# Patient Record
Sex: FEMALE | Race: WHITE | Employment: OTHER | URBAN - METROPOLITAN AREA
[De-identification: names, ages, dates, MRNs, and addresses within clinical notes are randomized per-mention and may not be internally consistent; named-entity substitution may affect disease eponyms.]

---

## 2018-01-12 PROBLEM — R07.9 CHEST PAIN: Status: ACTIVE | Noted: 2018-01-12

## 2018-01-12 PROBLEM — F17.210 CIGARETTE NICOTINE DEPENDENCE WITHOUT COMPLICATION: Chronic | Status: ACTIVE | Noted: 2018-01-12

## 2018-03-21 ENCOUNTER — PREP FOR PROCEDURE (OUTPATIENT)
Dept: OTOLARYNGOLOGY | Age: 68
End: 2018-03-21

## 2018-10-31 ENCOUNTER — APPOINTMENT (OUTPATIENT)
Dept: GENERAL RADIOLOGY | Age: 68
DRG: 153 | End: 2018-10-31
Payer: COMMERCIAL

## 2018-10-31 ENCOUNTER — HOSPITAL ENCOUNTER (INPATIENT)
Age: 68
LOS: 3 days | Discharge: HOME | DRG: 153 | End: 2018-11-03
Attending: EMERGENCY MEDICINE | Admitting: INTERNAL MEDICINE
Payer: COMMERCIAL

## 2018-10-31 DIAGNOSIS — E66.2 OBESITY HYPOVENTILATION SYNDROME (HCC): Primary | ICD-10-CM

## 2018-10-31 DIAGNOSIS — R09.89 ABNORMALLY LOW PEAK EXPIRATORY FLOW RATE: ICD-10-CM

## 2018-10-31 DIAGNOSIS — J06.9 UPPER RESPIRATORY TRACT INFECTION, UNSPECIFIED TYPE: ICD-10-CM

## 2018-10-31 DIAGNOSIS — R06.02 SHORTNESS OF BREATH: ICD-10-CM

## 2018-10-31 PROBLEM — E78.5 HYPERLIPIDEMIA LDL GOAL <100: Status: ACTIVE | Noted: 2018-10-31

## 2018-10-31 PROBLEM — E66.01 MORBID OBESITY WITH BMI OF 45.0-49.9, ADULT (HCC): Chronic | Status: ACTIVE | Noted: 2018-10-31

## 2018-10-31 PROBLEM — R07.9 CHEST PAIN: Status: RESOLVED | Noted: 2018-01-12 | Resolved: 2018-10-31

## 2018-10-31 PROBLEM — E03.9 ACQUIRED HYPOTHYROIDISM: Chronic | Status: ACTIVE | Noted: 2018-10-31

## 2018-10-31 PROBLEM — R06.00 DYSPNEA: Status: ACTIVE | Noted: 2018-10-31

## 2018-10-31 PROBLEM — F17.210 CIGARETTE NICOTINE DEPENDENCE WITHOUT COMPLICATION: Chronic | Status: RESOLVED | Noted: 2018-01-12 | Resolved: 2018-10-31

## 2018-10-31 LAB
ANION GAP SERPL CALCULATED.3IONS-SCNC: 9 MMOL/L (ref 7–16)
BACTERIA: ABNORMAL /HPF
BASOPHILS ABSOLUTE: 0.05 E9/L (ref 0–0.2)
BASOPHILS RELATIVE PERCENT: 0.5 % (ref 0–2)
BILIRUBIN URINE: NEGATIVE
BLOOD, URINE: NEGATIVE
BUN BLDV-MCNC: 13 MG/DL (ref 8–23)
CALCIUM SERPL-MCNC: 9.5 MG/DL (ref 8.6–10.2)
CHLORIDE BLD-SCNC: 99 MMOL/L (ref 98–107)
CLARITY: ABNORMAL
CO2: 32 MMOL/L (ref 22–29)
COLOR: YELLOW
CREAT SERPL-MCNC: 1.1 MG/DL (ref 0.5–1)
EOSINOPHILS ABSOLUTE: 0.62 E9/L (ref 0.05–0.5)
EOSINOPHILS RELATIVE PERCENT: 6.2 % (ref 0–6)
EPITHELIAL CELLS, UA: ABNORMAL /HPF
GFR AFRICAN AMERICAN: 60
GFR NON-AFRICAN AMERICAN: 49 ML/MIN/1.73
GLUCOSE BLD-MCNC: 137 MG/DL (ref 74–109)
GLUCOSE URINE: NEGATIVE MG/DL
HCT VFR BLD CALC: 48.2 % (ref 34–48)
HEMOGLOBIN: 15 G/DL (ref 11.5–15.5)
IMMATURE GRANULOCYTES #: 0.07 E9/L
IMMATURE GRANULOCYTES %: 0.7 % (ref 0–5)
INFLUENZA A BY PCR: NOT DETECTED
INFLUENZA B BY PCR: NOT DETECTED
KETONES, URINE: NEGATIVE MG/DL
LEUKOCYTE ESTERASE, URINE: ABNORMAL
LYMPHOCYTES ABSOLUTE: 1.97 E9/L (ref 1.5–4)
LYMPHOCYTES RELATIVE PERCENT: 19.6 % (ref 20–42)
MCH RBC QN AUTO: 27.8 PG (ref 26–35)
MCHC RBC AUTO-ENTMCNC: 31.1 % (ref 32–34.5)
MCV RBC AUTO: 89.3 FL (ref 80–99.9)
MONOCYTES ABSOLUTE: 0.75 E9/L (ref 0.1–0.95)
MONOCYTES RELATIVE PERCENT: 7.4 % (ref 2–12)
NEUTROPHILS ABSOLUTE: 6.61 E9/L (ref 1.8–7.3)
NEUTROPHILS RELATIVE PERCENT: 65.6 % (ref 43–80)
NITRITE, URINE: POSITIVE
PDW BLD-RTO: 14.4 FL (ref 11.5–15)
PH UA: 7.5 (ref 5–9)
PLATELET # BLD: 258 E9/L (ref 130–450)
PMV BLD AUTO: 9.6 FL (ref 7–12)
POTASSIUM REFLEX MAGNESIUM: 4 MMOL/L (ref 3.5–5)
PRO-BNP: 67 PG/ML (ref 0–125)
PROTEIN UA: ABNORMAL MG/DL
RBC # BLD: 5.4 E12/L (ref 3.5–5.5)
RBC UA: ABNORMAL /HPF (ref 0–2)
RSV BY PCR: NEGATIVE
SODIUM BLD-SCNC: 140 MMOL/L (ref 132–146)
SPECIFIC GRAVITY UA: 1.01 (ref 1–1.03)
TROPONIN: <0.01 NG/ML (ref 0–0.03)
UROBILINOGEN, URINE: 0.2 E.U./DL
WBC # BLD: 10.1 E9/L (ref 4.5–11.5)
WBC UA: ABNORMAL /HPF (ref 0–5)

## 2018-10-31 PROCEDURE — 99285 EMERGENCY DEPT VISIT HI MDM: CPT

## 2018-10-31 PROCEDURE — 84484 ASSAY OF TROPONIN QUANT: CPT

## 2018-10-31 PROCEDURE — 80048 BASIC METABOLIC PNL TOTAL CA: CPT

## 2018-10-31 PROCEDURE — 71045 X-RAY EXAM CHEST 1 VIEW: CPT

## 2018-10-31 PROCEDURE — 87502 INFLUENZA DNA AMP PROBE: CPT

## 2018-10-31 PROCEDURE — 83880 ASSAY OF NATRIURETIC PEPTIDE: CPT

## 2018-10-31 PROCEDURE — 1200000000 HC SEMI PRIVATE

## 2018-10-31 PROCEDURE — 6370000000 HC RX 637 (ALT 250 FOR IP): Performed by: EMERGENCY MEDICINE

## 2018-10-31 PROCEDURE — 81001 URINALYSIS AUTO W/SCOPE: CPT

## 2018-10-31 PROCEDURE — 36415 COLL VENOUS BLD VENIPUNCTURE: CPT

## 2018-10-31 PROCEDURE — 85025 COMPLETE CBC W/AUTO DIFF WBC: CPT

## 2018-10-31 PROCEDURE — 87807 RSV ASSAY W/OPTIC: CPT

## 2018-10-31 PROCEDURE — 94664 DEMO&/EVAL PT USE INHALER: CPT

## 2018-10-31 PROCEDURE — 94640 AIRWAY INHALATION TREATMENT: CPT

## 2018-10-31 RX ORDER — PREDNISONE 20 MG/1
60 TABLET ORAL ONCE
Status: COMPLETED | OUTPATIENT
Start: 2018-10-31 | End: 2018-10-31

## 2018-10-31 RX ORDER — ONDANSETRON 2 MG/ML
4 INJECTION INTRAMUSCULAR; INTRAVENOUS EVERY 6 HOURS PRN
Status: DISCONTINUED | OUTPATIENT
Start: 2018-10-31 | End: 2018-11-03 | Stop reason: HOSPADM

## 2018-10-31 RX ORDER — CYCLOBENZAPRINE HCL 10 MG
5 TABLET ORAL 2 TIMES DAILY
Status: DISCONTINUED | OUTPATIENT
Start: 2018-10-31 | End: 2018-11-03 | Stop reason: HOSPADM

## 2018-10-31 RX ORDER — AMLODIPINE BESYLATE 5 MG/1
5 TABLET ORAL 2 TIMES DAILY
Status: DISCONTINUED | OUTPATIENT
Start: 2018-10-31 | End: 2018-11-03 | Stop reason: HOSPADM

## 2018-10-31 RX ORDER — SIMVASTATIN 40 MG
40 TABLET ORAL NIGHTLY
Status: DISCONTINUED | OUTPATIENT
Start: 2018-10-31 | End: 2018-10-31 | Stop reason: CLARIF

## 2018-10-31 RX ORDER — ASPIRIN 81 MG/1
81 TABLET, CHEWABLE ORAL DAILY
Status: DISCONTINUED | OUTPATIENT
Start: 2018-11-01 | End: 2018-11-03 | Stop reason: HOSPADM

## 2018-10-31 RX ORDER — DOCUSATE SODIUM 100 MG/1
100 CAPSULE, LIQUID FILLED ORAL 2 TIMES DAILY
Status: DISCONTINUED | OUTPATIENT
Start: 2018-10-31 | End: 2018-11-03 | Stop reason: HOSPADM

## 2018-10-31 RX ORDER — DEXTROSE MONOHYDRATE 25 G/50ML
12.5 INJECTION, SOLUTION INTRAVENOUS PRN
Status: DISCONTINUED | OUTPATIENT
Start: 2018-10-31 | End: 2018-11-01 | Stop reason: SDUPTHER

## 2018-10-31 RX ORDER — DEXTROSE MONOHYDRATE 50 MG/ML
100 INJECTION, SOLUTION INTRAVENOUS PRN
Status: DISCONTINUED | OUTPATIENT
Start: 2018-10-31 | End: 2018-11-01 | Stop reason: SDUPTHER

## 2018-10-31 RX ORDER — LEVOTHYROXINE SODIUM 0.1 MG/1
200 TABLET ORAL DAILY
Status: DISCONTINUED | OUTPATIENT
Start: 2018-11-01 | End: 2018-11-03 | Stop reason: HOSPADM

## 2018-10-31 RX ORDER — NICOTINE POLACRILEX 4 MG
15 LOZENGE BUCCAL PRN
Status: DISCONTINUED | OUTPATIENT
Start: 2018-10-31 | End: 2018-11-01 | Stop reason: SDUPTHER

## 2018-10-31 RX ORDER — ATORVASTATIN CALCIUM 40 MG/1
40 TABLET, FILM COATED ORAL DAILY
COMMUNITY

## 2018-10-31 RX ORDER — CETIRIZINE HYDROCHLORIDE 10 MG/1
5 TABLET ORAL DAILY
Status: DISCONTINUED | OUTPATIENT
Start: 2018-11-01 | End: 2018-11-03 | Stop reason: HOSPADM

## 2018-10-31 RX ORDER — OMEPRAZOLE 20 MG/1
20 CAPSULE, DELAYED RELEASE ORAL DAILY
COMMUNITY
End: 2020-02-18 | Stop reason: ALTCHOICE

## 2018-10-31 RX ORDER — SODIUM CHLORIDE 0.9 % (FLUSH) 0.9 %
10 SYRINGE (ML) INJECTION EVERY 12 HOURS SCHEDULED
Status: DISCONTINUED | OUTPATIENT
Start: 2018-10-31 | End: 2018-11-03 | Stop reason: HOSPADM

## 2018-10-31 RX ORDER — IPRATROPIUM BROMIDE AND ALBUTEROL SULFATE 2.5; .5 MG/3ML; MG/3ML
1 SOLUTION RESPIRATORY (INHALATION)
Status: COMPLETED | OUTPATIENT
Start: 2018-10-31 | End: 2018-10-31

## 2018-10-31 RX ORDER — HYDROXYZINE HYDROCHLORIDE 10 MG/1
25 TABLET, FILM COATED ORAL 2 TIMES DAILY
Status: DISCONTINUED | OUTPATIENT
Start: 2018-10-31 | End: 2018-11-01 | Stop reason: CLARIF

## 2018-10-31 RX ORDER — ATORVASTATIN CALCIUM 40 MG/1
40 TABLET, FILM COATED ORAL DAILY
Status: DISCONTINUED | OUTPATIENT
Start: 2018-11-01 | End: 2018-11-03 | Stop reason: HOSPADM

## 2018-10-31 RX ORDER — PREGABALIN 75 MG/1
75 CAPSULE ORAL 2 TIMES DAILY
Status: DISCONTINUED | OUTPATIENT
Start: 2018-10-31 | End: 2018-11-03 | Stop reason: HOSPADM

## 2018-10-31 RX ORDER — HYDROCODONE BITARTRATE AND ACETAMINOPHEN 10; 325 MG/1; MG/1
2 TABLET ORAL EVERY 8 HOURS PRN
Status: DISCONTINUED | OUTPATIENT
Start: 2018-10-31 | End: 2018-11-03 | Stop reason: HOSPADM

## 2018-10-31 RX ORDER — PANTOPRAZOLE SODIUM 40 MG/1
40 TABLET, DELAYED RELEASE ORAL
Status: DISCONTINUED | OUTPATIENT
Start: 2018-11-01 | End: 2018-11-03 | Stop reason: HOSPADM

## 2018-10-31 RX ORDER — SODIUM CHLORIDE 0.9 % (FLUSH) 0.9 %
10 SYRINGE (ML) INJECTION PRN
Status: DISCONTINUED | OUTPATIENT
Start: 2018-10-31 | End: 2018-11-03 | Stop reason: HOSPADM

## 2018-10-31 RX ORDER — HYDROCHLOROTHIAZIDE 12.5 MG/1
12.5 TABLET ORAL EVERY MORNING
Status: DISCONTINUED | OUTPATIENT
Start: 2018-11-01 | End: 2018-11-03 | Stop reason: HOSPADM

## 2018-10-31 RX ORDER — ALBUTEROL SULFATE 90 UG/1
2 AEROSOL, METERED RESPIRATORY (INHALATION) EVERY 6 HOURS PRN
COMMUNITY
End: 2020-02-18 | Stop reason: ALTCHOICE

## 2018-10-31 RX ORDER — TRAMADOL HYDROCHLORIDE 50 MG/1
50 TABLET ORAL EVERY 6 HOURS PRN
Status: DISCONTINUED | OUTPATIENT
Start: 2018-10-31 | End: 2018-11-03 | Stop reason: HOSPADM

## 2018-10-31 RX ADMIN — IPRATROPIUM BROMIDE AND ALBUTEROL SULFATE 1 AMPULE: .5; 3 SOLUTION RESPIRATORY (INHALATION) at 18:23

## 2018-10-31 RX ADMIN — IPRATROPIUM BROMIDE AND ALBUTEROL SULFATE 1 AMPULE: .5; 3 SOLUTION RESPIRATORY (INHALATION) at 18:26

## 2018-10-31 RX ADMIN — IPRATROPIUM BROMIDE AND ALBUTEROL SULFATE 1 AMPULE: .5; 3 SOLUTION RESPIRATORY (INHALATION) at 18:24

## 2018-10-31 RX ADMIN — PREDNISONE 60 MG: 20 TABLET ORAL at 19:34

## 2018-10-31 ASSESSMENT — ENCOUNTER SYMPTOMS
SWOLLEN GLANDS: 0
WHEEZING: 0
COUGH: 1
SORE THROAT: 0
VOMITING: 0
SPUTUM PRODUCTION: 1
SHORTNESS OF BREATH: 1
ABDOMINAL PAIN: 0
HEMOPTYSIS: 0

## 2018-10-31 ASSESSMENT — PAIN SCALES - GENERAL
PAINLEVEL_OUTOF10: 7
PAINLEVEL_OUTOF10: 0

## 2018-10-31 ASSESSMENT — PULMONARY FUNCTION TESTS: PEFR_L/MIN: 230

## 2018-10-31 ASSESSMENT — PAIN DESCRIPTION - LOCATION: LOCATION: RIB CAGE

## 2018-10-31 NOTE — ED NOTES
Pt to ED for flulike symptoms beginning a week ago. Pt had cough and c/o bilateral rib pain. Pt stated that she has a small amount of yellow sputum, denies any blood.        Juan Alberto Mata RN  10/31/18 [id number]

## 2018-10-31 NOTE — ED PROVIDER NOTES
Patient is a 79-year-old female presents from home for treatment of shortness of breath. She states that she has been progressively getting more and more short of breath last week, however her lift home is broken so she is unable to get her doctor. She states that she called her primary care physician and he recommended that she call an ambulance to come to the ED for evaluation. Patient states that this happens to her every year approximately this time of year, she gets short of breath. She also states that she has been coughing up yellow mucus. She denies any abdominal pain, diarrhea, constipation, chest pain, headache, nausea, or vomiting. She states that she thinks she has the flu. The history is provided by the patient. Shortness of Breath   Severity:  Moderate  Onset quality:  Gradual  Duration:  1 week  Timing:  Constant  Progression:  Worsening  Chronicity:  New  Context: activity    Relieved by:  None tried  Worsened by: Activity, exertion and movement  Ineffective treatments:  None tried  Associated symptoms: cough and sputum production    Associated symptoms: no abdominal pain, no chest pain, no claudication, no diaphoresis, no ear pain, no fever, no headaches, no hemoptysis, no neck pain, no PND, no rash, no sore throat, no syncope, no swollen glands, no vomiting and no wheezing    Risk factors: obesity        Review of Systems   Constitutional: Negative for diaphoresis and fever. HENT: Negative for ear pain and sore throat. Respiratory: Positive for cough, sputum production and shortness of breath. Negative for hemoptysis and wheezing. Cardiovascular: Negative for chest pain, claudication, syncope and PND. Gastrointestinal: Negative for abdominal pain and vomiting. Musculoskeletal: Negative for neck pain. Skin: Negative for rash. Neurological: Negative for headaches. Physical Exam   Constitutional: She is oriented to person, place, and time.  Vital signs are normal. She 146 mmol/L    Potassium reflex Magnesium 4.0 3.5 - 5.0 mmol/L    Chloride 99 98 - 107 mmol/L    CO2 32 (H) 22 - 29 mmol/L    Anion Gap 9 7 - 16 mmol/L    Glucose 137 (H) 74 - 109 mg/dL    BUN 13 8 - 23 mg/dL    CREATININE 1.1 (H) 0.5 - 1.0 mg/dL    GFR Non-African American 49 >=60 mL/min/1.73    GFR African American 60     Calcium 9.5 8.6 - 10.2 mg/dL   Brain Natriuretic Peptide   Result Value Ref Range    Pro-BNP 67 0 - 125 pg/mL   Troponin   Result Value Ref Range    Troponin <0.01 0.00 - 0.03 ng/mL       RADIOLOGY:  XR CHEST PORTABLE   Final Result   1. Stable, enlarged cardiac mediastinal silhouette with underlying   central pulmonary vascular congestion. 2. Indeterminate opacification of the lung bases, findings could be   seen in atelectasis versus pneumonia, clinical correlation   recommended. ------------------------- NURSING NOTES AND VITALS REVIEWED ---------------------------  Date / Time Roomed:  10/31/2018  4:05 PM  ED Bed Assignment:  07/07    The nursing notes within the ED encounter and vital signs as below have been reviewed. Patient Vitals for the past 24 hrs:   BP Temp Temp src Pulse Resp SpO2 Height Weight   10/31/18 1936 (!) 132/94 - - 104 18 97 % - -   10/31/18 1826 - - - - - 100 % - -   10/31/18 1614 114/67 98.5 °F (36.9 °C) Oral 94 18 96 % 5' 5\" (1.651 m) (!) 489 lb (221.8 kg)       Oxygen Saturation Interpretation: Normal    ------------------------------------------ PROGRESS NOTES ------------------------------------------  Re-evaluation(s):  Time: 2000  Patients symptoms are improving  Repeat physical examination is improved    Counseling:  I have spoken with the patient and discussed todays results, in addition to providing specific details for the plan of care and counseling regarding the diagnosis and prognosis.   Their questions are answered at this time and they are agreeable with the plan of admission.    --------------------------------- ADDITIONAL PROVIDER NOTES ---------------------------------  Consultations:  Time: 1955. Spoke with Dr. Edvin Oliver. Discussed case. They will admit the patient. This patient's ED course included: a personal history and physicial examination    This patient has remained hemodynamically stable during their ED course. Diagnosis:  1. Obesity hypoventilation syndrome ([address]    2. Shortness of breath    3. Upper respiratory tract infection, unspecified type    4. Abnormally low peak expiratory flow rate        Disposition:  Patient's disposition: Admit to Med/Surg  Patient's condition is stable.            Daniela Us DO  Resident  10/31/18 2009

## 2018-11-01 PROBLEM — E11.9 DM (DIABETES MELLITUS), TYPE 2 (HCC): Status: ACTIVE | Noted: 2018-11-01

## 2018-11-01 LAB
ALBUMIN SERPL-MCNC: 3.4 G/DL (ref 3.5–5.2)
ALP BLD-CCNC: 108 U/L (ref 35–104)
ALT SERPL-CCNC: 10 U/L (ref 0–32)
ANION GAP SERPL CALCULATED.3IONS-SCNC: 14 MMOL/L (ref 7–16)
AST SERPL-CCNC: 19 U/L (ref 0–31)
BASOPHILS ABSOLUTE: 0.02 E9/L (ref 0–0.2)
BASOPHILS RELATIVE PERCENT: 0.2 % (ref 0–2)
BILIRUB SERPL-MCNC: 0.2 MG/DL (ref 0–1.2)
BUN BLDV-MCNC: 16 MG/DL (ref 8–23)
CALCIUM SERPL-MCNC: 9.3 MG/DL (ref 8.6–10.2)
CHLORIDE BLD-SCNC: 94 MMOL/L (ref 98–107)
CO2: 25 MMOL/L (ref 22–29)
CREAT SERPL-MCNC: 1 MG/DL (ref 0.5–1)
EOSINOPHILS ABSOLUTE: 0.01 E9/L (ref 0.05–0.5)
EOSINOPHILS RELATIVE PERCENT: 0.1 % (ref 0–6)
GFR AFRICAN AMERICAN: >60
GFR NON-AFRICAN AMERICAN: 55 ML/MIN/1.73
GLUCOSE BLD-MCNC: 280 MG/DL (ref 74–109)
HBA1C MFR BLD: 7 % (ref 4–5.6)
HCT VFR BLD CALC: 41.4 % (ref 34–48)
HEMOGLOBIN: 13 G/DL (ref 11.5–15.5)
IMMATURE GRANULOCYTES #: 0.1 E9/L
IMMATURE GRANULOCYTES %: 0.8 % (ref 0–5)
LYMPHOCYTES ABSOLUTE: 1.23 E9/L (ref 1.5–4)
LYMPHOCYTES RELATIVE PERCENT: 10.1 % (ref 20–42)
MAGNESIUM: 2 MG/DL (ref 1.6–2.6)
MCH RBC QN AUTO: 27.4 PG (ref 26–35)
MCHC RBC AUTO-ENTMCNC: 31.4 % (ref 32–34.5)
MCV RBC AUTO: 87.3 FL (ref 80–99.9)
METER GLUCOSE: 141 MG/DL (ref 70–110)
METER GLUCOSE: 152 MG/DL (ref 70–110)
METER GLUCOSE: 154 MG/DL (ref 70–110)
METER GLUCOSE: 196 MG/DL (ref 70–110)
METER GLUCOSE: 233 MG/DL (ref 70–110)
MONOCYTES ABSOLUTE: 0.28 E9/L (ref 0.1–0.95)
MONOCYTES RELATIVE PERCENT: 2.3 % (ref 2–12)
NEUTROPHILS ABSOLUTE: 10.51 E9/L (ref 1.8–7.3)
NEUTROPHILS RELATIVE PERCENT: 86.5 % (ref 43–80)
PDW BLD-RTO: 14.3 FL (ref 11.5–15)
PLATELET # BLD: 293 E9/L (ref 130–450)
PMV BLD AUTO: 10.3 FL (ref 7–12)
POTASSIUM SERPL-SCNC: 5.2 MMOL/L (ref 3.5–5)
RBC # BLD: 4.74 E12/L (ref 3.5–5.5)
SODIUM BLD-SCNC: 133 MMOL/L (ref 132–146)
TOTAL PROTEIN: 7.3 G/DL (ref 6.4–8.3)
WBC # BLD: 12.2 E9/L (ref 4.5–11.5)

## 2018-11-01 PROCEDURE — 94660 CPAP INITIATION&MGMT: CPT

## 2018-11-01 PROCEDURE — 6360000002 HC RX W HCPCS: Performed by: INTERNAL MEDICINE

## 2018-11-01 PROCEDURE — G8987 SELF CARE CURRENT STATUS: HCPCS

## 2018-11-01 PROCEDURE — 80053 COMPREHEN METABOLIC PANEL: CPT

## 2018-11-01 PROCEDURE — 87581 M.PNEUMON DNA AMP PROBE: CPT

## 2018-11-01 PROCEDURE — G8988 SELF CARE GOAL STATUS: HCPCS

## 2018-11-01 PROCEDURE — 83036 HEMOGLOBIN GLYCOSYLATED A1C: CPT

## 2018-11-01 PROCEDURE — 6370000000 HC RX 637 (ALT 250 FOR IP): Performed by: INTERNAL MEDICINE

## 2018-11-01 PROCEDURE — 83735 ASSAY OF MAGNESIUM: CPT

## 2018-11-01 PROCEDURE — 85025 COMPLETE CBC W/AUTO DIFF WBC: CPT

## 2018-11-01 PROCEDURE — 97165 OT EVAL LOW COMPLEX 30 MIN: CPT

## 2018-11-01 PROCEDURE — 36415 COLL VENOUS BLD VENIPUNCTURE: CPT

## 2018-11-01 PROCEDURE — 87633 RESP VIRUS 12-25 TARGETS: CPT

## 2018-11-01 PROCEDURE — 87486 CHLMYD PNEUM DNA AMP PROBE: CPT

## 2018-11-01 PROCEDURE — 94640 AIRWAY INHALATION TREATMENT: CPT

## 2018-11-01 PROCEDURE — 2580000003 HC RX 258: Performed by: INTERNAL MEDICINE

## 2018-11-01 PROCEDURE — 2500000003 HC RX 250 WO HCPCS: Performed by: INTERNAL MEDICINE

## 2018-11-01 PROCEDURE — 87798 DETECT AGENT NOS DNA AMP: CPT

## 2018-11-01 PROCEDURE — 97161 PT EVAL LOW COMPLEX 20 MIN: CPT

## 2018-11-01 PROCEDURE — 82962 GLUCOSE BLOOD TEST: CPT

## 2018-11-01 PROCEDURE — 1200000000 HC SEMI PRIVATE

## 2018-11-01 RX ORDER — PREDNISONE 20 MG/1
20 TABLET ORAL 2 TIMES DAILY
Status: DISCONTINUED | OUTPATIENT
Start: 2018-11-01 | End: 2018-11-03 | Stop reason: HOSPADM

## 2018-11-01 RX ORDER — HYDROXYZINE PAMOATE 25 MG/1
25 CAPSULE ORAL 2 TIMES DAILY
Status: DISCONTINUED | OUTPATIENT
Start: 2018-11-01 | End: 2018-11-03 | Stop reason: HOSPADM

## 2018-11-01 RX ORDER — IPRATROPIUM BROMIDE AND ALBUTEROL SULFATE 2.5; .5 MG/3ML; MG/3ML
1 SOLUTION RESPIRATORY (INHALATION)
Status: DISCONTINUED | OUTPATIENT
Start: 2018-11-01 | End: 2018-11-03 | Stop reason: HOSPADM

## 2018-11-01 RX ORDER — DEXTROSE MONOHYDRATE 25 G/50ML
12.5 INJECTION, SOLUTION INTRAVENOUS PRN
Status: DISCONTINUED | OUTPATIENT
Start: 2018-11-01 | End: 2018-11-03 | Stop reason: HOSPADM

## 2018-11-01 RX ORDER — NICOTINE POLACRILEX 4 MG
15 LOZENGE BUCCAL PRN
Status: DISCONTINUED | OUTPATIENT
Start: 2018-11-01 | End: 2018-11-03 | Stop reason: HOSPADM

## 2018-11-01 RX ORDER — DEXTROSE MONOHYDRATE 50 MG/ML
100 INJECTION, SOLUTION INTRAVENOUS PRN
Status: DISCONTINUED | OUTPATIENT
Start: 2018-11-01 | End: 2018-11-03 | Stop reason: HOSPADM

## 2018-11-01 RX ADMIN — DOCUSATE SODIUM 100 MG: 100 CAPSULE, LIQUID FILLED ORAL at 20:26

## 2018-11-01 RX ADMIN — HYDROXYZINE PAMOATE 25 MG: 25 CAPSULE ORAL at 20:26

## 2018-11-01 RX ADMIN — Medication 10 ML: at 08:47

## 2018-11-01 RX ADMIN — CYCLOBENZAPRINE HYDROCHLORIDE 5 MG: 10 TABLET, FILM COATED ORAL at 00:19

## 2018-11-01 RX ADMIN — METOPROLOL TARTRATE 25 MG: 25 TABLET ORAL at 08:42

## 2018-11-01 RX ADMIN — INSULIN LISPRO 1 UNITS: 100 INJECTION, SOLUTION INTRAVENOUS; SUBCUTANEOUS at 00:22

## 2018-11-01 RX ADMIN — ATORVASTATIN CALCIUM 40 MG: 40 TABLET, FILM COATED ORAL at 08:42

## 2018-11-01 RX ADMIN — PREDNISONE 20 MG: 20 TABLET ORAL at 20:26

## 2018-11-01 RX ADMIN — AMLODIPINE BESYLATE 5 MG: 5 TABLET ORAL at 20:26

## 2018-11-01 RX ADMIN — CYCLOBENZAPRINE HYDROCHLORIDE 5 MG: 10 TABLET, FILM COATED ORAL at 20:26

## 2018-11-01 RX ADMIN — HYDROCODONE BITARTRATE AND ACETAMINOPHEN 2 TABLET: 10; 325 TABLET ORAL at 00:27

## 2018-11-01 RX ADMIN — IPRATROPIUM BROMIDE AND ALBUTEROL SULFATE 1 AMPULE: .5; 3 SOLUTION RESPIRATORY (INHALATION) at 19:29

## 2018-11-01 RX ADMIN — HYDROXYZINE HYDROCHLORIDE 25 MG: 10 TABLET ORAL at 08:43

## 2018-11-01 RX ADMIN — HYDROCODONE BITARTRATE AND ACETAMINOPHEN 2 TABLET: 10; 325 TABLET ORAL at 08:52

## 2018-11-01 RX ADMIN — AZITHROMYCIN MONOHYDRATE 500 MG: 500 INJECTION, POWDER, LYOPHILIZED, FOR SOLUTION INTRAVENOUS at 16:04

## 2018-11-01 RX ADMIN — INSULIN LISPRO 1 UNITS: 100 INJECTION, SOLUTION INTRAVENOUS; SUBCUTANEOUS at 12:07

## 2018-11-01 RX ADMIN — TRAMADOL HYDROCHLORIDE 50 MG: 50 TABLET, FILM COATED ORAL at 05:59

## 2018-11-01 RX ADMIN — DOCUSATE SODIUM 100 MG: 100 CAPSULE, LIQUID FILLED ORAL at 00:19

## 2018-11-01 RX ADMIN — METOPROLOL TARTRATE 25 MG: 25 TABLET ORAL at 20:26

## 2018-11-01 RX ADMIN — LEVOTHYROXINE SODIUM 200 MCG: 100 TABLET ORAL at 05:57

## 2018-11-01 RX ADMIN — MICONAZOLE NITRATE: 20 POWDER TOPICAL at 22:58

## 2018-11-01 RX ADMIN — IPRATROPIUM BROMIDE AND ALBUTEROL SULFATE 1 AMPULE: .5; 3 SOLUTION RESPIRATORY (INHALATION) at 16:16

## 2018-11-01 RX ADMIN — Medication 10 ML: at 00:20

## 2018-11-01 RX ADMIN — PREGABALIN 75 MG: 75 CAPSULE ORAL at 20:26

## 2018-11-01 RX ADMIN — AMLODIPINE BESYLATE 5 MG: 5 TABLET ORAL at 00:20

## 2018-11-01 RX ADMIN — ENOXAPARIN SODIUM 40 MG: 40 INJECTION SUBCUTANEOUS at 08:49

## 2018-11-01 RX ADMIN — HYDROCHLOROTHIAZIDE 12.5 MG: 12.5 TABLET ORAL at 08:42

## 2018-11-01 RX ADMIN — DOCUSATE SODIUM 100 MG: 100 CAPSULE, LIQUID FILLED ORAL at 08:42

## 2018-11-01 RX ADMIN — INSULIN LISPRO 1 UNITS: 100 INJECTION, SOLUTION INTRAVENOUS; SUBCUTANEOUS at 17:38

## 2018-11-01 RX ADMIN — PREGABALIN 75 MG: 75 CAPSULE ORAL at 00:19

## 2018-11-01 RX ADMIN — ASPIRIN 81 MG CHEWABLE TABLET 81 MG: 81 TABLET CHEWABLE at 08:43

## 2018-11-01 RX ADMIN — HYDROXYZINE HYDROCHLORIDE 25 MG: 10 TABLET ORAL at 00:20

## 2018-11-01 RX ADMIN — AMLODIPINE BESYLATE 5 MG: 5 TABLET ORAL at 08:43

## 2018-11-01 RX ADMIN — HYDROCODONE BITARTRATE AND ACETAMINOPHEN 2 TABLET: 10; 325 TABLET ORAL at 20:44

## 2018-11-01 RX ADMIN — PANTOPRAZOLE SODIUM 40 MG: 40 TABLET, DELAYED RELEASE ORAL at 05:57

## 2018-11-01 RX ADMIN — CETIRIZINE HYDROCHLORIDE 5 MG: 10 TABLET, FILM COATED ORAL at 08:43

## 2018-11-01 RX ADMIN — METOPROLOL TARTRATE 25 MG: 25 TABLET ORAL at 00:20

## 2018-11-01 RX ADMIN — CYCLOBENZAPRINE HYDROCHLORIDE 5 MG: 10 TABLET, FILM COATED ORAL at 08:53

## 2018-11-01 RX ADMIN — PREGABALIN 75 MG: 75 CAPSULE ORAL at 08:43

## 2018-11-01 RX ADMIN — Medication 10 ML: at 20:27

## 2018-11-01 RX ADMIN — INSULIN LISPRO 2 UNITS: 100 INJECTION, SOLUTION INTRAVENOUS; SUBCUTANEOUS at 08:44

## 2018-11-01 ASSESSMENT — PAIN DESCRIPTION - DESCRIPTORS
DESCRIPTORS: ACHING;DISCOMFORT
DESCRIPTORS: ACHING;DISCOMFORT
DESCRIPTORS: CRAMPING;CONSTANT;ACHING
DESCRIPTORS: ACHING;DISCOMFORT

## 2018-11-01 ASSESSMENT — PAIN DESCRIPTION - PROGRESSION
CLINICAL_PROGRESSION: NOT CHANGED

## 2018-11-01 ASSESSMENT — PAIN DESCRIPTION - PAIN TYPE
TYPE: CHRONIC PAIN
TYPE: ACUTE PAIN
TYPE: CHRONIC PAIN;ACUTE PAIN
TYPE: ACUTE PAIN

## 2018-11-01 ASSESSMENT — PAIN SCALES - GENERAL
PAINLEVEL_OUTOF10: 4
PAINLEVEL_OUTOF10: 7
PAINLEVEL_OUTOF10: 0
PAINLEVEL_OUTOF10: 4
PAINLEVEL_OUTOF10: 4
PAINLEVEL_OUTOF10: 8

## 2018-11-01 ASSESSMENT — PAIN DESCRIPTION - FREQUENCY
FREQUENCY: CONTINUOUS

## 2018-11-01 ASSESSMENT — PAIN DESCRIPTION - LOCATION
LOCATION: CHEST;RIB CAGE
LOCATION: CHEST;RIB CAGE
LOCATION: BACK;RIB CAGE
LOCATION: BACK

## 2018-11-01 ASSESSMENT — PAIN DESCRIPTION - ORIENTATION
ORIENTATION: RIGHT;LEFT
ORIENTATION: RIGHT;LEFT
ORIENTATION: MID;LOWER
ORIENTATION: MID;RIGHT;LEFT

## 2018-11-01 ASSESSMENT — PAIN DESCRIPTION - ONSET
ONSET: ON-GOING

## 2018-11-01 NOTE — CARE COORDINATION
Social work / discharge planning   11/1/2018 12:34 PM    Met with patient who states she lives alone in 1 story home with lift chair to get inside home. Patient has rollator, cane, shower chair, BSC, hospital bed, power wheelchair for community use, nebulizer and C-pap at home. Patient has [employer] services 7 days a week 3 hours in am and 3 hours in pm.   Patient denies any need for [address] at this time   Patient's states her daughter will pick her up when she is ready for discharge. Denies any needs at this time.   Sw to follow  Electronically signed by NADIA Hannon on 11/1/2018 at 2:21 PM

## 2018-11-01 NOTE — H&P
9.3 11/01/2018    BILITOT 0.2 11/01/2018    ALKPHOS 108 11/01/2018    AST 19 11/01/2018    ALT 10 11/01/2018     Magnesium:    Lab Results   Component Value Date    MG 2.0 11/01/2018     Phosphorus:    Lab Results   Component Value Date    PHOS 3.5 11/29/2016     Warfarin PT/INR:  No components found for: Lorra Nip  Troponin:    Lab Results   Component Value Date    TROPONINI <0.01 10/31/2018     U/A:    Lab Results   Component Value Date    COLORU Yellow 10/31/2018    PROTEINU TRACE 10/31/2018    PHUR 7.5 10/31/2018    WBCUA 10-20 10/31/2018    RBCUA 0-1 10/31/2018    BACTERIA MANY 10/31/2018    CLARITYU SL CLOUDY 10/31/2018    SPECGRAV 1.010 10/31/2018    LEUKOCYTESUR SMALL 10/31/2018    UROBILINOGEN 0.2 10/31/2018    BILIRUBINUR Negative 10/31/2018    BLOODU Negative 10/31/2018    GLUCOSEU Negative 10/31/2018    AMORPHOUS FEW 11/23/2016     ABG:    Lab Results   Component Value Date    PH 7.416 02/22/2016    PCO2 50.8 02/22/2016    PO2 76.1 02/22/2016    HCO3 31.9 02/22/2016    BE 6.1 02/22/2016    O2SAT 95.6 02/22/2016     VBG:    Lab Results   Component Value Date    PHVEN 7.24 11/23/2016     HgBA1c:    Lab Results   Component Value Date    LABA1C 7.0 11/01/2018     Microalbumen/Creatinine ratio:  No components found for: RUCREAT  FLP:  No results found for: TRIG, HDL, LDLCALC, LDLDIRECT, LABVLDL  TSH:    Lab Results   Component Value Date    TSH 0.146 11/23/2016     XR CHEST PORTABLE   Final Result   1. Stable, enlarged cardiac mediastinal silhouette with underlying   central pulmonary vascular congestion. 2. Indeterminate opacification of the lung bases, findings could be   seen in atelectasis versus pneumonia, clinical correlation   recommended.           ASSESSMENT:      Principal Problem:    COPD exacerbation ([facility]  Active Problems:    Obesity hypoventilation syndrome (HCC)    SHALA on CPAP    Morbid obesity with BMI of 45.0-49.9, adult (HCC)    DM (diabetes mellitus), type 2 ([facility]  Resolved

## 2018-11-02 LAB
FILM ARRAY ADENOVIRUS: NORMAL
FILM ARRAY BORDETELLA PERTUSSIS: NORMAL
FILM ARRAY CHLAMYDOPHILIA PNEUMONIAE: NORMAL
FILM ARRAY CORONAVIRUS 229E: NORMAL
FILM ARRAY CORONAVIRUS HKU1: NORMAL
FILM ARRAY CORONAVIRUS NL63: NORMAL
FILM ARRAY CORONAVIRUS OC43: NORMAL
FILM ARRAY INFLUENZA A VIRUS 09H1: NORMAL
FILM ARRAY INFLUENZA A VIRUS H1: NORMAL
FILM ARRAY INFLUENZA A VIRUS H3: NORMAL
FILM ARRAY INFLUENZA A VIRUS: NORMAL
FILM ARRAY INFLUENZA B: NORMAL
FILM ARRAY METAPNEUMOVIRUS: NORMAL
FILM ARRAY MYCOPLASMA PNEUMONIAE: NORMAL
FILM ARRAY PARAINFLUENZA VIRUS 1: NORMAL
FILM ARRAY PARAINFLUENZA VIRUS 2: NORMAL
FILM ARRAY PARAINFLUENZA VIRUS 3: NORMAL
FILM ARRAY PARAINFLUENZA VIRUS 4: NORMAL
FILM ARRAY RESPIRATORY SYNCITIAL VIRUS: NORMAL
FILM ARRAY RHINOVIRUS/ENTEROVIRUS: NORMAL
METER GLUCOSE: 142 MG/DL (ref 70–110)
METER GLUCOSE: 165 MG/DL (ref 70–110)
METER GLUCOSE: 177 MG/DL (ref 70–110)
METER GLUCOSE: 195 MG/DL (ref 70–110)

## 2018-11-02 PROCEDURE — 97535 SELF CARE MNGMENT TRAINING: CPT

## 2018-11-02 PROCEDURE — 82962 GLUCOSE BLOOD TEST: CPT

## 2018-11-02 PROCEDURE — 2580000003 HC RX 258: Performed by: INTERNAL MEDICINE

## 2018-11-02 PROCEDURE — 6360000002 HC RX W HCPCS: Performed by: INTERNAL MEDICINE

## 2018-11-02 PROCEDURE — 6370000000 HC RX 637 (ALT 250 FOR IP): Performed by: INTERNAL MEDICINE

## 2018-11-02 PROCEDURE — 94660 CPAP INITIATION&MGMT: CPT

## 2018-11-02 PROCEDURE — 94640 AIRWAY INHALATION TREATMENT: CPT

## 2018-11-02 PROCEDURE — 94760 N-INVAS EAR/PLS OXIMETRY 1: CPT

## 2018-11-02 PROCEDURE — 1200000000 HC SEMI PRIVATE

## 2018-11-02 RX ORDER — GUAIFENESIN 600 MG/1
600 TABLET, EXTENDED RELEASE ORAL 2 TIMES DAILY
Status: DISCONTINUED | OUTPATIENT
Start: 2018-11-02 | End: 2018-11-02 | Stop reason: ALTCHOICE

## 2018-11-02 RX ORDER — GUAIFENESIN 400 MG/1
400 TABLET ORAL 3 TIMES DAILY
Status: DISCONTINUED | OUTPATIENT
Start: 2018-11-02 | End: 2018-11-03 | Stop reason: HOSPADM

## 2018-11-02 RX ADMIN — ATORVASTATIN CALCIUM 40 MG: 40 TABLET, FILM COATED ORAL at 08:49

## 2018-11-02 RX ADMIN — CYCLOBENZAPRINE HYDROCHLORIDE 5 MG: 10 TABLET, FILM COATED ORAL at 20:32

## 2018-11-02 RX ADMIN — Medication 10 ML: at 20:32

## 2018-11-02 RX ADMIN — DOCUSATE SODIUM 100 MG: 100 CAPSULE, LIQUID FILLED ORAL at 08:49

## 2018-11-02 RX ADMIN — AMLODIPINE BESYLATE 5 MG: 5 TABLET ORAL at 20:31

## 2018-11-02 RX ADMIN — HYDROCODONE BITARTRATE AND ACETAMINOPHEN 2 TABLET: 10; 325 TABLET ORAL at 08:49

## 2018-11-02 RX ADMIN — ENOXAPARIN SODIUM 40 MG: 40 INJECTION SUBCUTANEOUS at 09:14

## 2018-11-02 RX ADMIN — ASPIRIN 81 MG CHEWABLE TABLET 81 MG: 81 TABLET CHEWABLE at 08:49

## 2018-11-02 RX ADMIN — CYCLOBENZAPRINE HYDROCHLORIDE 5 MG: 10 TABLET, FILM COATED ORAL at 08:49

## 2018-11-02 RX ADMIN — PREDNISONE 20 MG: 20 TABLET ORAL at 20:32

## 2018-11-02 RX ADMIN — HYDROCODONE BITARTRATE AND ACETAMINOPHEN 2 TABLET: 10; 325 TABLET ORAL at 20:37

## 2018-11-02 RX ADMIN — IPRATROPIUM BROMIDE AND ALBUTEROL SULFATE 1 AMPULE: .5; 3 SOLUTION RESPIRATORY (INHALATION) at 15:32

## 2018-11-02 RX ADMIN — HYDROXYZINE PAMOATE 25 MG: 25 CAPSULE ORAL at 20:31

## 2018-11-02 RX ADMIN — IPRATROPIUM BROMIDE AND ALBUTEROL SULFATE 1 AMPULE: .5; 3 SOLUTION RESPIRATORY (INHALATION) at 20:45

## 2018-11-02 RX ADMIN — INSULIN LISPRO 2 UNITS: 100 INJECTION, SOLUTION INTRAVENOUS; SUBCUTANEOUS at 08:50

## 2018-11-02 RX ADMIN — CETIRIZINE HYDROCHLORIDE 5 MG: 10 TABLET, FILM COATED ORAL at 08:49

## 2018-11-02 RX ADMIN — INSULIN LISPRO 2 UNITS: 100 INJECTION, SOLUTION INTRAVENOUS; SUBCUTANEOUS at 18:02

## 2018-11-02 RX ADMIN — GUAIFENESIN 400 MG: 400 TABLET ORAL at 13:44

## 2018-11-02 RX ADMIN — HYDROCHLOROTHIAZIDE 12.5 MG: 12.5 TABLET ORAL at 08:49

## 2018-11-02 RX ADMIN — INSULIN LISPRO 1 UNITS: 100 INJECTION, SOLUTION INTRAVENOUS; SUBCUTANEOUS at 20:36

## 2018-11-02 RX ADMIN — METOPROLOL TARTRATE 25 MG: 25 TABLET ORAL at 08:49

## 2018-11-02 RX ADMIN — AMLODIPINE BESYLATE 5 MG: 5 TABLET ORAL at 08:49

## 2018-11-02 RX ADMIN — GUAIFENESIN 400 MG: 400 TABLET ORAL at 20:31

## 2018-11-02 RX ADMIN — Medication 10 ML: at 08:50

## 2018-11-02 RX ADMIN — TRAMADOL HYDROCHLORIDE 50 MG: 50 TABLET, FILM COATED ORAL at 13:44

## 2018-11-02 RX ADMIN — PREDNISONE 20 MG: 20 TABLET ORAL at 08:49

## 2018-11-02 RX ADMIN — AZITHROMYCIN MONOHYDRATE 500 MG: 500 INJECTION, POWDER, LYOPHILIZED, FOR SOLUTION INTRAVENOUS at 14:26

## 2018-11-02 RX ADMIN — MICONAZOLE NITRATE: 20 POWDER TOPICAL at 08:48

## 2018-11-02 RX ADMIN — LEVOTHYROXINE SODIUM 200 MCG: 100 TABLET ORAL at 06:22

## 2018-11-02 RX ADMIN — METOPROLOL TARTRATE 25 MG: 25 TABLET ORAL at 20:31

## 2018-11-02 RX ADMIN — HYDROXYZINE PAMOATE 25 MG: 25 CAPSULE ORAL at 08:49

## 2018-11-02 RX ADMIN — PREGABALIN 75 MG: 75 CAPSULE ORAL at 20:31

## 2018-11-02 RX ADMIN — IPRATROPIUM BROMIDE AND ALBUTEROL SULFATE 1 AMPULE: .5; 3 SOLUTION RESPIRATORY (INHALATION) at 11:28

## 2018-11-02 RX ADMIN — INSULIN LISPRO 2 UNITS: 100 INJECTION, SOLUTION INTRAVENOUS; SUBCUTANEOUS at 12:04

## 2018-11-02 RX ADMIN — IPRATROPIUM BROMIDE AND ALBUTEROL SULFATE 1 AMPULE: .5; 3 SOLUTION RESPIRATORY (INHALATION) at 07:29

## 2018-11-02 RX ADMIN — DOCUSATE SODIUM 100 MG: 100 CAPSULE, LIQUID FILLED ORAL at 20:31

## 2018-11-02 RX ADMIN — PREGABALIN 75 MG: 75 CAPSULE ORAL at 08:48

## 2018-11-02 RX ADMIN — PANTOPRAZOLE SODIUM 40 MG: 40 TABLET, DELAYED RELEASE ORAL at 06:23

## 2018-11-02 ASSESSMENT — PAIN DESCRIPTION - DESCRIPTORS
DESCRIPTORS: CONSTANT;ACHING;DISCOMFORT

## 2018-11-02 ASSESSMENT — PAIN DESCRIPTION - PAIN TYPE
TYPE: CHRONIC PAIN

## 2018-11-02 ASSESSMENT — PAIN DESCRIPTION - LOCATION
LOCATION: BACK

## 2018-11-02 ASSESSMENT — PAIN SCALES - GENERAL
PAINLEVEL_OUTOF10: 3
PAINLEVEL_OUTOF10: 6
PAINLEVEL_OUTOF10: 3
PAINLEVEL_OUTOF10: 0
PAINLEVEL_OUTOF10: 3
PAINLEVEL_OUTOF10: 0
PAINLEVEL_OUTOF10: 6
PAINLEVEL_OUTOF10: 6

## 2018-11-02 ASSESSMENT — PAIN DESCRIPTION - FREQUENCY
FREQUENCY: CONTINUOUS

## 2018-11-02 ASSESSMENT — PAIN DESCRIPTION - ONSET
ONSET: ON-GOING

## 2018-11-02 ASSESSMENT — PAIN DESCRIPTION - ORIENTATION
ORIENTATION: MID;LOWER
ORIENTATION: LOWER;MID

## 2018-11-02 ASSESSMENT — PAIN DESCRIPTION - PROGRESSION
CLINICAL_PROGRESSION: NOT CHANGED
CLINICAL_PROGRESSION: NOT CHANGED

## 2018-11-02 NOTE — PLAN OF CARE
Problem: Falls - Risk of:  Goal: Will remain free from falls  Will remain free from falls   Outcome: Met This Shift      Problem: Breathing Pattern - Ineffective:  Goal: Ability to achieve and maintain a regular respiratory rate will improve  Ability to achieve and maintain a regular respiratory rate will improve   Outcome: Met This Shift

## 2018-11-03 VITALS
BODY MASS INDEX: 48.82 KG/M2 | OXYGEN SATURATION: 96 % | TEMPERATURE: 98.1 F | WEIGHT: 293 LBS | SYSTOLIC BLOOD PRESSURE: 127 MMHG | RESPIRATION RATE: 18 BRPM | HEART RATE: 74 BPM | HEIGHT: 65 IN | DIASTOLIC BLOOD PRESSURE: 74 MMHG

## 2018-11-03 PROBLEM — J96.22 ACUTE ON CHRONIC RESPIRATORY FAILURE WITH HYPOXIA AND HYPERCAPNIA (HCC): Status: ACTIVE | Noted: 2018-11-03

## 2018-11-03 PROBLEM — J96.21 ACUTE ON CHRONIC RESPIRATORY FAILURE WITH HYPOXIA AND HYPERCAPNIA (HCC): Status: ACTIVE | Noted: 2018-11-03

## 2018-11-03 LAB
ANION GAP SERPL CALCULATED.3IONS-SCNC: 12 MMOL/L (ref 7–16)
BUN BLDV-MCNC: 25 MG/DL (ref 8–23)
CALCIUM SERPL-MCNC: 9.5 MG/DL (ref 8.6–10.2)
CHLORIDE BLD-SCNC: 99 MMOL/L (ref 98–107)
CO2: 24 MMOL/L (ref 22–29)
CREAT SERPL-MCNC: 1.1 MG/DL (ref 0.5–1)
GFR AFRICAN AMERICAN: 60
GFR NON-AFRICAN AMERICAN: 49 ML/MIN/1.73
GLUCOSE BLD-MCNC: 150 MG/DL (ref 74–109)
HCT VFR BLD CALC: 41.8 % (ref 34–48)
HEMOGLOBIN: 13.5 G/DL (ref 11.5–15.5)
MCH RBC QN AUTO: 28 PG (ref 26–35)
MCHC RBC AUTO-ENTMCNC: 32.3 % (ref 32–34.5)
MCV RBC AUTO: 86.5 FL (ref 80–99.9)
METER GLUCOSE: 119 MG/DL (ref 70–110)
METER GLUCOSE: 129 MG/DL (ref 70–110)
METER GLUCOSE: 155 MG/DL (ref 70–110)
PDW BLD-RTO: 14.5 FL (ref 11.5–15)
PLATELET # BLD: 270 E9/L (ref 130–450)
PMV BLD AUTO: 9.8 FL (ref 7–12)
POTASSIUM SERPL-SCNC: 4.3 MMOL/L (ref 3.5–5)
RBC # BLD: 4.83 E12/L (ref 3.5–5.5)
SODIUM BLD-SCNC: 135 MMOL/L (ref 132–146)
TSH SERPL DL<=0.05 MIU/L-ACNC: 0.07 UIU/ML (ref 0.27–4.2)
WBC # BLD: 13.2 E9/L (ref 4.5–11.5)

## 2018-11-03 PROCEDURE — 6370000000 HC RX 637 (ALT 250 FOR IP): Performed by: INTERNAL MEDICINE

## 2018-11-03 PROCEDURE — 99223 1ST HOSP IP/OBS HIGH 75: CPT | Performed by: INTERNAL MEDICINE

## 2018-11-03 PROCEDURE — 85027 COMPLETE CBC AUTOMATED: CPT

## 2018-11-03 PROCEDURE — 2580000003 HC RX 258: Performed by: INTERNAL MEDICINE

## 2018-11-03 PROCEDURE — 82962 GLUCOSE BLOOD TEST: CPT

## 2018-11-03 PROCEDURE — 36415 COLL VENOUS BLD VENIPUNCTURE: CPT

## 2018-11-03 PROCEDURE — 6360000002 HC RX W HCPCS: Performed by: INTERNAL MEDICINE

## 2018-11-03 PROCEDURE — 94640 AIRWAY INHALATION TREATMENT: CPT

## 2018-11-03 PROCEDURE — 94660 CPAP INITIATION&MGMT: CPT

## 2018-11-03 PROCEDURE — 80048 BASIC METABOLIC PNL TOTAL CA: CPT

## 2018-11-03 PROCEDURE — 84443 ASSAY THYROID STIM HORMONE: CPT

## 2018-11-03 RX ORDER — PREDNISONE 10 MG/1
TABLET ORAL
Qty: 20 TABLET | Refills: 0 | Status: SHIPPED | OUTPATIENT
Start: 2018-11-03 | End: 2018-11-03

## 2018-11-03 RX ORDER — AZITHROMYCIN 500 MG/1
500 TABLET, FILM COATED ORAL DAILY
Qty: 1 PACKET | Refills: 0 | Status: SHIPPED | OUTPATIENT
Start: 2018-11-03 | End: 2018-11-03

## 2018-11-03 RX ORDER — PREDNISONE 10 MG/1
TABLET ORAL
Qty: 20 TABLET | Refills: 0 | Status: SHIPPED | OUTPATIENT
Start: 2018-11-03 | End: 2018-11-26

## 2018-11-03 RX ORDER — AZITHROMYCIN 500 MG/1
500 TABLET, FILM COATED ORAL DAILY
Qty: 1 PACKET | Refills: 0 | Status: SHIPPED | OUTPATIENT
Start: 2018-11-03 | End: 2018-11-06

## 2018-11-03 RX ADMIN — HYDROCODONE BITARTRATE AND ACETAMINOPHEN 2 TABLET: 10; 325 TABLET ORAL at 08:35

## 2018-11-03 RX ADMIN — ENOXAPARIN SODIUM 40 MG: 40 INJECTION SUBCUTANEOUS at 08:35

## 2018-11-03 RX ADMIN — LEVOTHYROXINE SODIUM 200 MCG: 100 TABLET ORAL at 06:13

## 2018-11-03 RX ADMIN — HYDROXYZINE PAMOATE 25 MG: 25 CAPSULE ORAL at 08:34

## 2018-11-03 RX ADMIN — PANTOPRAZOLE SODIUM 40 MG: 40 TABLET, DELAYED RELEASE ORAL at 06:13

## 2018-11-03 RX ADMIN — TRAMADOL HYDROCHLORIDE 50 MG: 50 TABLET, FILM COATED ORAL at 15:00

## 2018-11-03 RX ADMIN — ASPIRIN 81 MG CHEWABLE TABLET 81 MG: 81 TABLET CHEWABLE at 08:34

## 2018-11-03 RX ADMIN — CYCLOBENZAPRINE HYDROCHLORIDE 5 MG: 10 TABLET, FILM COATED ORAL at 08:35

## 2018-11-03 RX ADMIN — DOCUSATE SODIUM 100 MG: 100 CAPSULE, LIQUID FILLED ORAL at 08:34

## 2018-11-03 RX ADMIN — GUAIFENESIN 400 MG: 400 TABLET ORAL at 15:00

## 2018-11-03 RX ADMIN — PREDNISONE 20 MG: 20 TABLET ORAL at 08:35

## 2018-11-03 RX ADMIN — PREGABALIN 75 MG: 75 CAPSULE ORAL at 08:34

## 2018-11-03 RX ADMIN — IPRATROPIUM BROMIDE AND ALBUTEROL SULFATE 1 AMPULE: .5; 3 SOLUTION RESPIRATORY (INHALATION) at 15:52

## 2018-11-03 RX ADMIN — HYDROCHLOROTHIAZIDE 12.5 MG: 12.5 TABLET ORAL at 08:34

## 2018-11-03 RX ADMIN — GUAIFENESIN 400 MG: 400 TABLET ORAL at 08:34

## 2018-11-03 RX ADMIN — IPRATROPIUM BROMIDE AND ALBUTEROL SULFATE 1 AMPULE: .5; 3 SOLUTION RESPIRATORY (INHALATION) at 08:09

## 2018-11-03 RX ADMIN — Medication 10 ML: at 08:38

## 2018-11-03 RX ADMIN — ATORVASTATIN CALCIUM 40 MG: 40 TABLET, FILM COATED ORAL at 08:34

## 2018-11-03 RX ADMIN — CETIRIZINE HYDROCHLORIDE 5 MG: 10 TABLET, FILM COATED ORAL at 08:35

## 2018-11-03 RX ADMIN — AMLODIPINE BESYLATE 5 MG: 5 TABLET ORAL at 08:34

## 2018-11-03 RX ADMIN — AZITHROMYCIN MONOHYDRATE 500 MG: 500 INJECTION, POWDER, LYOPHILIZED, FOR SOLUTION INTRAVENOUS at 16:32

## 2018-11-03 RX ADMIN — METOPROLOL TARTRATE 25 MG: 25 TABLET ORAL at 08:35

## 2018-11-03 RX ADMIN — MICONAZOLE NITRATE: 20 POWDER TOPICAL at 08:38

## 2018-11-03 ASSESSMENT — PAIN SCALES - GENERAL
PAINLEVEL_OUTOF10: 7
PAINLEVEL_OUTOF10: 4
PAINLEVEL_OUTOF10: 0
PAINLEVEL_OUTOF10: 7

## 2018-11-03 ASSESSMENT — PAIN DESCRIPTION - LOCATION: LOCATION: BACK

## 2018-11-03 ASSESSMENT — PAIN DESCRIPTION - PAIN TYPE: TYPE: CHRONIC PAIN

## 2018-11-03 NOTE — PROGRESS NOTES
11/02/18 2228   NIV Type   Mode BIPAP   Mask Type Full face mask   Mask Size Medium   Settings/Measurements   Comfort Level Good   Using Accessory Muscles No   IPAP 10 cmH20   EPAP 5 cmH2O   Rate Ordered 10   Resp 24   FiO2  21 %   Vt Exhaled 682 mL   Mask Leak (lpm) 50 lpm   Skin Protection for O2 Device Yes   Date: 11/2/2018    Time: 10:29 PM    Patient Placed On BIPAP/CPAP/ Non-Invasive Ventilation? Yes    If no must comment. Facial area red/color change? No           If YES are Blister/Lesion present? No   If yes must notify nursing staff  BIPAP/CPAP skin barrier?   Yes    Skin barrier type:Liquicel       Comments:        Isela Torres
Date: 11/1/2018    Time: 11:12 PM    Patient Placed On BIPAP/CPAP/ Non-Invasive Ventilation? Yes    If no must comment. Facial area red/color change? No           If YES are Blister/Lesion present? No   If yes must notify nursing staff  BIPAP/CPAP skin barrier? Yes    Skin barrier type:Liquicell       Comments: Pt placed on BiPAP for the night, liquicell in place.         Dayron Gutiérrez      11/01/18 2357   NIV Type   Mode BIPAP   Mask Type Full face mask   Mask Size Medium   Settings/Measurements   Comfort Level Good   Using Accessory Muscles No   IPAP 10 cmH20   EPAP 5 cmH2O   Rate Ordered 10   Resp 22   SpO2 94   Vt Exhaled 445 mL   Mask Leak (lpm) 10 lpm   Skin Protection for O2 Device Yes  (liquicell)
100 feet with [address] with independent    LE ROM  Grossly WFL        LE strength  WFL        AM- PAC RAW score  18/24                Pt is alert and Oriented x 3      Balance:supervision with gait, no LOB   Endurance: decreased  Chair alarm: None     ASSESSMENT    Pt displays functional ability as noted in the objective portion of this evaluation.       Comments/Treatment:  Left up in chair with call light in reach         Examination of body systems Decreased   Functional mobility x   ROM    Strength x   Safety Awareness     Cognition     Endurance x   Sensation     Balance x   Vision/Visual Deficits     Coordination           Patient education  Pt educated on fall risk      Patient response to education:   Pt verbalized understanding Pt demonstrated skill Pt requires further education in this area   yes  x      Rehab potential is Good for reaching above PT goals.       Pts/ family goals     1. Return home     Patient and or family understand(s) diagnosis, prognosis, and plan of care. - yes     PLAN    PT care will be provided in accordance with the objectives noted above. Whenever appropriate, clear delegation orders will be provided for nursing staff. Exercises and functional mobility practice will be used as well as appropriate assistive devices or modalities to obtain goals.  Patient and family education will also be administered as needed.     Frequency of treatments will be 2-3x/week x 3-4days.     Time in: 1005   Time out: 14[address] number:  PT [id number]             
Brandt Napoles MD on 11/2/2018 at 10:12 AM

## 2018-11-03 NOTE — CONSULTS
MG tablet Take 40 mg for 2 days, then take 30 mg for 2 days, then take 20 mg for 2 days, then take 10 mg for 2 days 11/3/18  Yes Bessie Pabon MD   azithromycin [facility]) 500 MG tablet Take 1 tablet by mouth daily for 3 days 11/3/18 11/6/18 Yes Bessie Pabon MD   omeprazole (PRILOSEC) 20 MG delayed release capsule Take 20 mg by mouth Daily   Yes Historical Provider, MD   albuterol sulfate  (90 Base) MCG/ACT inhaler Inhale 2 puffs into the lungs every 6 hours as needed for Wheezing   Yes Historical Provider, MD   atorvastatin (LIPITOR) 40 MG tablet Take 40 mg by mouth daily   Yes Historical Provider, MD   amLODIPine (NORVASC) 5 MG tablet Take 5 mg by mouth 2 times daily Take morning of surgery with a sip of water   Yes Historical Provider, MD   vitamin D (CHOLECALCIFEROL) 1000 UNIT TABS tablet Take 1,000 Units by mouth daily LD 3/7/18   Yes Historical Provider, MD   traMADol (ULTRAM) 50 MG tablet Take 50 mg by mouth 2 times daily. Yes Historical Provider, MD   pregabalin (LYRICA) 75 MG capsule Take 75 mg by mouth 2 times daily. Yes Historical Provider, MD   HYDROcodone-acetaminophen (NORCO)  MG per tablet Take 2 tablets by mouth every 8 hours as needed for Pain. Yes Historical Provider, MD   miconazole (MICOTIN) 2 % powder Apply topically 2 times daily. Apply to reddened areas. Substituted for Nystatin (MICOSTATIN) powder.  1/12/18  Yes BRENDA Escamilla - CNP   cetirizine (ZYRTEC) 5 MG tablet Take 5 mg by mouth daily   Yes Historical Provider, MD   hydrOXYzine (ATARAX) 25 MG tablet Take 25 mg by mouth 2 times daily Take morning of surgery with a sip of water, if needed   Yes Historical Provider, MD   cyclobenzaprine (FLEXERIL) 5 MG tablet Take 5 mg by mouth 2 times daily    Yes Historical Provider, MD   metoprolol tartrate (LOPRESSOR) 25 MG tablet Take 25 mg by mouth 2 times daily Take morning of surgery with a sip of water   Yes Historical Provider, MD   hydrochlorothiazide (MICROZIDE) 12.5

## 2018-11-04 ENCOUNTER — HOSPITAL ENCOUNTER (EMERGENCY)
Age: 68
Discharge: HOME | End: 2018-11-04
Attending: EMERGENCY MEDICINE
Payer: COMMERCIAL

## 2018-11-04 VITALS
TEMPERATURE: 98.4 F | BODY MASS INDEX: 48.82 KG/M2 | OXYGEN SATURATION: 94 % | WEIGHT: 293 LBS | HEART RATE: 87 BPM | DIASTOLIC BLOOD PRESSURE: 60 MMHG | SYSTOLIC BLOOD PRESSURE: 147 MMHG | HEIGHT: 65 IN | RESPIRATION RATE: 16 BRPM

## 2018-11-04 DIAGNOSIS — R59.0 CERVICAL LYMPHADENOPATHY: Primary | ICD-10-CM

## 2018-11-04 PROCEDURE — 6370000000 HC RX 637 (ALT 250 FOR IP): Performed by: EMERGENCY MEDICINE

## 2018-11-04 PROCEDURE — 99282 EMERGENCY DEPT VISIT SF MDM: CPT

## 2018-11-04 RX ORDER — AMOXICILLIN AND CLAVULANATE POTASSIUM 875; 125 MG/1; MG/1
1 TABLET, FILM COATED ORAL ONCE
Status: COMPLETED | OUTPATIENT
Start: 2018-11-04 | End: 2018-11-04

## 2018-11-04 RX ORDER — AMOXICILLIN AND CLAVULANATE POTASSIUM 875; 125 MG/1; MG/1
1 TABLET, FILM COATED ORAL 2 TIMES DAILY
Qty: 20 TABLET | Refills: 0 | Status: SHIPPED | OUTPATIENT
Start: 2018-11-04 | End: 2018-11-14

## 2018-11-04 RX ADMIN — AMOXICILLIN AND CLAVULANATE POTASSIUM 1 TABLET: 875; 125 TABLET, FILM COATED ORAL at 18:44

## 2018-11-04 ASSESSMENT — PAIN DESCRIPTION - LOCATION: LOCATION: THROAT

## 2018-11-04 ASSESSMENT — PAIN SCALES - GENERAL: PAINLEVEL_OUTOF10: 5

## 2018-11-04 NOTE — ED PROVIDER NOTES
Department of Emergency Medicine   ED  Provider Note  Admit Date/RoomTime: 11/4/2018  5:44 PM  ED Room: 18/18          History of Present Illness:  11/4/18, Time: 6:27 PM         Jose Camacho is a 76 y.o. female presenting to the ED for phryngitis, beginning 1 night ago. The complaint has been persistent, moderate in severity, and worsened by nothing. Pt was recently admitted for the same complaint and as d/c last night. She reports that this morning, her throat was swollen and she had difficulty swallowing. Additionally, pt says that she has not been able to get the medication she was prescribed while admitted. Hx of COPD. No other complaints. Pt denies fever, chills, ear pain, GI symptoms, abdominal pain, urinary symptoms, headache, dizziness, changes in vision, SOB, chest pain, back pain, neck pain, extremity pain, weakness, numbness, tingling or any other symptoms at this time. Review of Systems:   Pertinent positives and negatives are stated within HPI, all other systems reviewed and are negative.      --------------------------------------------- PAST HISTORY ---------------------------------------------  Past Medical History:  has a past medical history of Arthritis; Cerebral artery occlusion with cerebral infarction ([facility] Cigarette nicotine dependence without complication; COPD (chronic obstructive pulmonary disease) ([facility] Depression; Diabetes mellitus ([facility] GERD (gastroesophageal reflux disease); Hyperlipidemia; Hypertension; Hypothyroidism; Obese; SHALA (obstructive sleep apnea); and Palate mass. Past Surgical History:  has a past surgical history that includes Hysterectomy; Foot surgery (Right, 2009); Appendectomy; Tonsillectomy; Colonoscopy; Endoscopy, colon, diagnostic; Carpal tunnel release (Bilateral); other surgical history (Left, 11/5/2014); Foot surgery (2014); Hammer toe surgery (Left, 03/06/2017); and joint replacement (bilat knee).     Social History:  reports that she has quit

## 2018-11-05 ENCOUNTER — TELEPHONE (OUTPATIENT)
Dept: CARDIOLOGY CLINIC | Age: 68
End: 2018-11-05

## 2018-11-05 DIAGNOSIS — I48.0 PAROXYSMAL ATRIAL FIBRILLATION (HCC): Primary | ICD-10-CM

## 2018-11-05 LAB
EKG ATRIAL RATE: 110 BPM
EKG Q-T INTERVAL: 264 MS
EKG QRS DURATION: 80 MS
EKG QTC CALCULATION (BAZETT): 371 MS
EKG R AXIS: 34 DEGREES
EKG T AXIS: 17 DEGREES
EKG VENTRICULAR RATE: 119 BPM

## 2018-11-19 ENCOUNTER — HOSPITAL ENCOUNTER (OUTPATIENT)
Dept: CARDIOLOGY | Age: 68
Discharge: HOME | End: 2018-11-19
Payer: COMMERCIAL

## 2018-11-26 ENCOUNTER — OFFICE VISIT (OUTPATIENT)
Dept: CARDIOLOGY CLINIC | Age: 68
End: 2018-11-26
Payer: COMMERCIAL

## 2018-11-26 VITALS
RESPIRATION RATE: 20 BRPM | BODY MASS INDEX: 48.82 KG/M2 | DIASTOLIC BLOOD PRESSURE: 80 MMHG | HEART RATE: 93 BPM | SYSTOLIC BLOOD PRESSURE: 110 MMHG | WEIGHT: 293 LBS | HEIGHT: 65 IN

## 2018-11-26 DIAGNOSIS — E78.5 HYPERLIPIDEMIA LDL GOAL <100: ICD-10-CM

## 2018-11-26 DIAGNOSIS — I48.91 ATRIAL FIBRILLATION, UNSPECIFIED TYPE (HCC): Primary | ICD-10-CM

## 2018-11-26 DIAGNOSIS — I10 HYPERTENSION, UNSPECIFIED TYPE: ICD-10-CM

## 2018-11-26 DIAGNOSIS — G47.33 OSA ON CPAP: Chronic | ICD-10-CM

## 2018-11-26 DIAGNOSIS — Z99.89 OSA ON CPAP: Chronic | ICD-10-CM

## 2018-11-26 DIAGNOSIS — R06.09 DOE (DYSPNEA ON EXERTION): ICD-10-CM

## 2018-11-26 PROCEDURE — G8484 FLU IMMUNIZE NO ADMIN: HCPCS | Performed by: PHYSICIAN ASSISTANT

## 2018-11-26 PROCEDURE — 1101F PT FALLS ASSESS-DOCD LE1/YR: CPT | Performed by: PHYSICIAN ASSISTANT

## 2018-11-26 PROCEDURE — 1036F TOBACCO NON-USER: CPT | Performed by: PHYSICIAN ASSISTANT

## 2018-11-26 PROCEDURE — 1111F DSCHRG MED/CURRENT MED MERGE: CPT | Performed by: PHYSICIAN ASSISTANT

## 2018-11-26 PROCEDURE — 99213 OFFICE O/P EST LOW 20 MIN: CPT | Performed by: PHYSICIAN ASSISTANT

## 2018-11-26 PROCEDURE — 93000 ELECTROCARDIOGRAM COMPLETE: CPT | Performed by: INTERNAL MEDICINE

## 2018-11-26 PROCEDURE — G8400 PT W/DXA NO RESULTS DOC: HCPCS | Performed by: PHYSICIAN ASSISTANT

## 2018-11-26 PROCEDURE — 1123F ACP DISCUSS/DSCN MKR DOCD: CPT | Performed by: PHYSICIAN ASSISTANT

## 2018-11-26 PROCEDURE — G8417 CALC BMI ABV UP PARAM F/U: HCPCS | Performed by: PHYSICIAN ASSISTANT

## 2018-11-26 PROCEDURE — G8427 DOCREV CUR MEDS BY ELIG CLIN: HCPCS | Performed by: PHYSICIAN ASSISTANT

## 2018-11-26 PROCEDURE — 4040F PNEUMOC VAC/ADMIN/RCVD: CPT | Performed by: PHYSICIAN ASSISTANT

## 2018-11-26 PROCEDURE — 3017F COLORECTAL CA SCREEN DOC REV: CPT | Performed by: PHYSICIAN ASSISTANT

## 2018-11-26 PROCEDURE — 1090F PRES/ABSN URINE INCON ASSESS: CPT | Performed by: PHYSICIAN ASSISTANT

## 2018-11-26 RX ORDER — SIMVASTATIN 40 MG
40 TABLET ORAL NIGHTLY
COMMUNITY
End: 2018-11-26 | Stop reason: ALTCHOICE

## 2018-11-26 NOTE — PROGRESS NOTES
Morbid obesity with BMI of 45.0-49.9, adult ([facility] 10/31/2018    Acquired hypothyroidism 10/31/2018       PAST MEDICAL HISTORY:       Diagnosis Date    Arthritis     Cerebral artery occlusion with cerebral infarction [facility]) 1998?    tia     Cigarette nicotine dependence without complication 9/26/4090    COPD (chronic obstructive pulmonary disease) ([facility]     Depression 10/2014    \"doing ok\". anxiety    Diabetes mellitus ([facility]     prediabetic    GERD (gastroesophageal reflux disease)     Hyperlipidemia     Hypertension     Hypothyroidism     Obese 10/2014    states weight aprox. 400 lbs.  SHALA (obstructive sleep apnea)     no treatment    Palate mass         SURGICAL HISTORY:   Past Surgical History:   Procedure Laterality Date    APPENDECTOMY      CARPAL TUNNEL RELEASE Bilateral     17 years ago    COLONOSCOPY      ENDOSCOPY, COLON, DIAGNOSTIC      FOOT SURGERY Right 2009    FOOT SURGERY  2014    reconstructive for hammer toes all four, not big toe left foot    HAMMER TOE SURGERY Left 03/06/2017    HYSTERECTOMY      JOINT REPLACEMENT  bilat knee    OTHER SURGICAL HISTORY Left 11/5/2014    hammer toe correction 2-4left foot and hardware;left calcaneous x2,flexor digital longis repair    TONSILLECTOMY          SOCIAL AND OCCUPATIONAL HEALTH HISTORY:    Social History     Social History    Marital status:      Spouse name: N/A    Number of children: N/A    Years of education: N/A     Occupational History    Not on file.      Social History Main Topics    Smoking status: Former Smoker     Packs/day: 0.50    Smokeless tobacco: Never Used    Alcohol use No    Drug use: No    Sexual activity: Not on file     Other Topics Concern    Not on file     Social History Narrative    No narrative on file       MEDICATIONS:   Current Outpatient Prescriptions   Medication Sig Dispense Refill    vitamin D (CHOLECALCIFEROL) 1000 UNIT TABS tablet Take 1,000 Units by mouth daily     

## 2018-11-26 NOTE — PATIENT INSTRUCTIONS
day for men and 1 drink a day for women. Too much alcohol can cause health problems.     · Avoid colds and flu. Get a pneumococcal vaccine shot. If you have had one before, ask your doctor whether you need another dose. Get a flu shot every year. If you must be around people with colds or flu, wash your hands often. Activity    · If your doctor recommends it, get more exercise. Walking is a good choice. Bit by bit, increase the amount you walk every day. Try for at least 30 minutes on most days of the week. You also may want to swim, bike, or do other activities. Your doctor may suggest that you join a cardiac rehabilitation program so that you can have help increasing your physical activity safely.     · Start light exercise if your doctor says it is okay. Even a small amount will help you get stronger, have more energy, and manage stress. Walking is an easy way to get exercise. Start out by walking a little more than you did in the hospital. Gradually increase the amount you walk.     · When you exercise, watch for signs that your heart is working too hard. You are pushing too hard if you cannot talk while you are exercising. If you become short of breath or dizzy or have chest pain, sit down and rest immediately.     · Check your pulse regularly. Place two fingers on the artery at the palm side of your wrist, in line with your thumb. If your heartbeat seems uneven or fast, talk to your doctor. When should you call for help? Call 911 anytime you think you may need emergency care. For example, call if:    · You have symptoms of a heart attack. These may include:  ? Chest pain or pressure, or a strange feeling in the chest.  ? Sweating. ? Shortness of breath. ? Nausea or vomiting. ? Pain, pressure, or a strange feeling in the back, neck, jaw, or upper belly or in one or both shoulders or arms. ? Lightheadedness or sudden weakness. ? A fast or irregular heartbeat.   After you call 911, the  may tell you to chew 1 adult-strength or 2 to 4 low-dose aspirin. Wait for an ambulance. Do not try to drive yourself.     · You have symptoms of a stroke. These may include:  ? Sudden numbness, tingling, weakness, or loss of movement in your face, arm, or leg, especially on only one side of your body. ? Sudden vision changes. ? Sudden trouble speaking. ? Sudden confusion or trouble understanding simple statements. ? Sudden problems with walking or balance. ? A sudden, severe headache that is different from past headaches.     · You passed out (lost consciousness).    Call your doctor now or seek immediate medical care if:    · You have new or increased shortness of breath.     · You feel dizzy or lightheaded, or you feel like you may faint.     · Your heart rate becomes irregular.     · You can feel your heart flutter in your chest or skip heartbeats. Tell your doctor if these symptoms are new or worse.    Watch closely for changes in your health, and be sure to contact your doctor if you have any problems. Where can you learn more? Go to [email or web address]://[employer]. org and sign in to your "[employer]" account. Enter U020 in the [place] box to learn more about \"Atrial Fibrillation: Care Instructions. \"     If you do not have an account, please click on the \"Sign Up Now\" link. Current as of: December 6, 2017  Content Version: 11.8  © 5088-7745 [employer]. Care instructions adapted under license by [facility] ([facility]). If you have questions about a medical condition or this instruction, always ask your healthcare professional. [place] [address] any warranty or liability for your use of this information.

## 2018-12-10 PROBLEM — I48.91 ATRIAL FIBRILLATION (HCC): Status: ACTIVE | Noted: 2018-12-10

## 2018-12-11 ENCOUNTER — TELEPHONE (OUTPATIENT)
Dept: CARDIOLOGY | Age: 68
End: 2018-12-11

## 2018-12-11 NOTE — TELEPHONE ENCOUNTER
CALLED PATIENT TO CANCEL ECHO DUE TO AUTH IS PENDING. PATIENT STATES THAT SHE WANTS TO HAVE THE ECHO DONE AFTER THE HOLIDAYS.   Electronically signed by Alysia Edward on 12/11/2018 at 7:37 AM

## 2019-01-02 ENCOUNTER — HOSPITAL ENCOUNTER (OUTPATIENT)
Dept: GENERAL RADIOLOGY | Age: 69
Discharge: HOME | End: 2019-01-04
Payer: COMMERCIAL

## 2019-01-02 ENCOUNTER — HOSPITAL ENCOUNTER (OUTPATIENT)
Age: 69
Discharge: HOME | End: 2019-01-04
Payer: COMMERCIAL

## 2019-01-02 DIAGNOSIS — R52 PAIN: ICD-10-CM

## 2019-01-02 PROCEDURE — 72110 X-RAY EXAM L-2 SPINE 4/>VWS: CPT

## 2019-02-01 ENCOUNTER — HOSPITAL ENCOUNTER (OUTPATIENT)
Dept: GENERAL RADIOLOGY | Age: 69
Discharge: HOME | End: 2019-02-03
Payer: COMMERCIAL

## 2019-02-01 ENCOUNTER — HOSPITAL ENCOUNTER (OUTPATIENT)
Age: 69
Discharge: HOME | End: 2019-02-03
Payer: COMMERCIAL

## 2019-02-01 DIAGNOSIS — R52 PAIN: ICD-10-CM

## 2019-02-01 PROCEDURE — 72050 X-RAY EXAM NECK SPINE 4/5VWS: CPT

## 2019-02-15 ENCOUNTER — TELEPHONE (OUTPATIENT)
Dept: CARDIOLOGY | Age: 69
End: 2019-02-15

## 2019-03-15 ENCOUNTER — HOSPITAL ENCOUNTER (OUTPATIENT)
Dept: CARDIOLOGY | Age: 69
Discharge: HOME | End: 2019-03-15
Payer: COMMERCIAL

## 2019-03-15 DIAGNOSIS — I48.0 PAROXYSMAL ATRIAL FIBRILLATION (HCC): ICD-10-CM

## 2019-03-15 LAB
LV EF: 55 %
LVEF MODALITY: NORMAL

## 2019-03-15 PROCEDURE — 93306 TTE W/DOPPLER COMPLETE: CPT

## 2019-04-09 ENCOUNTER — APPOINTMENT (OUTPATIENT)
Dept: CT IMAGING | Age: 69
End: 2019-04-09
Payer: COMMERCIAL

## 2019-04-09 ENCOUNTER — HOSPITAL ENCOUNTER (EMERGENCY)
Age: 69
Discharge: HOME | End: 2019-04-09
Attending: EMERGENCY MEDICINE
Payer: COMMERCIAL

## 2019-04-09 VITALS
OXYGEN SATURATION: 96 % | BODY MASS INDEX: 73.39 KG/M2 | DIASTOLIC BLOOD PRESSURE: 76 MMHG | HEART RATE: 86 BPM | TEMPERATURE: 98.6 F | RESPIRATION RATE: 16 BRPM | HEIGHT: 65 IN | SYSTOLIC BLOOD PRESSURE: 140 MMHG

## 2019-04-09 DIAGNOSIS — K42.9 UMBILICAL HERNIA WITHOUT OBSTRUCTION AND WITHOUT GANGRENE: Primary | ICD-10-CM

## 2019-04-09 PROCEDURE — 74176 CT ABD & PELVIS W/O CONTRAST: CPT

## 2019-04-09 PROCEDURE — 99284 EMERGENCY DEPT VISIT MOD MDM: CPT

## 2019-04-09 PROCEDURE — 6360000004 HC RX CONTRAST MEDICATION: Performed by: RADIOLOGY

## 2019-04-09 RX ADMIN — IOHEXOL 50 ML: 240 INJECTION, SOLUTION INTRATHECAL; INTRAVASCULAR; INTRAVENOUS; ORAL at 18:09

## 2019-04-09 ASSESSMENT — ENCOUNTER SYMPTOMS
SORE THROAT: 0
ABDOMINAL PAIN: 1
DIARRHEA: 0
BACK PAIN: 0
COUGH: 0
EYE PAIN: 0
VOMITING: 0
SINUS PRESSURE: 0
NAUSEA: 0
SHORTNESS OF BREATH: 0
ABDOMINAL DISTENTION: 0
EYE DISCHARGE: 0
WHEEZING: 0
EYE REDNESS: 0

## 2019-04-09 ASSESSMENT — PAIN SCALES - GENERAL: PAINLEVEL_OUTOF10: 2

## 2019-04-09 ASSESSMENT — PAIN DESCRIPTION - LOCATION: LOCATION: ABDOMEN

## 2019-04-09 ASSESSMENT — PAIN DESCRIPTION - DESCRIPTORS: DESCRIPTORS: PATIENT UNABLE TO DESCRIBE

## 2019-04-09 ASSESSMENT — PAIN DESCRIPTION - PAIN TYPE: TYPE: CHRONIC PAIN

## 2019-04-09 ASSESSMENT — PAIN DESCRIPTION - ORIENTATION: ORIENTATION: MID

## 2019-04-09 NOTE — ED NOTES
Notified Dr. Asif Gonzalez and Dr. Carina Katz about inability to collect blood work on pt at this time, they want the ct imaging first and will let us know if they want labs later.      Conner Barnard RN  04/09/19 5739

## 2019-04-09 NOTE — ED PROVIDER NOTES
Normocephalic and atraumatic. Eyes: Pupils are equal, round, and reactive to light. Neck: Normal range of motion. Neck supple. Cardiovascular: Normal rate and regular rhythm. Pulmonary/Chest: Effort normal and breath sounds normal. No respiratory distress. She has no wheezes. She has no rales. Abdominal: Soft. Bowel sounds are normal. She exhibits mass (Non-well-defined mass little left of midline feels to be the size of a plum. ). There is no tenderness. There is no rebound and no guarding. Musculoskeletal: She exhibits no edema. Neurological: She is alert and oriented to person, place, and time. No cranial nerve deficit. Coordination normal.   Skin: Skin is warm and dry. Nursing note and vitals reviewed. Procedures    MDM  Number of Diagnoses or Management Options  Diagnosis management comments: Patient is a 51-year-old morbidly obese female presented with new mass left of midline in the abdomen. She 1st noticed it 6 months ago. Last 6 weeks and started becoming painful. Her PCP when her to come over to have some imaging performed. She denies any other symptoms at this time. There is a plum sized mass that is not well defined the left of midline where she is tender. Most likely hernia but does not feel as if it can be reproduced to any extent. We'll obtain a CT of the abdomen with oral contrast to evaluate.                 --------------------------------------------- PAST HISTORY ---------------------------------------------  Past Medical History:  has a past medical history of Arthritis, Cerebral artery occlusion with cerebral infarction ([facility] [address] Cigarette nicotine dependence without complication, COPD (chronic obstructive pulmonary disease) ([facility] [address] Depression, Diabetes mellitus ([facility] [address] GERD (gastroesophageal reflux disease), Hyperlipidemia, Hypertension, Hypothyroidism, Obese, SHALA (obstructive sleep apnea), and Palate mass.     Past Surgical History:  has a past surgical history that includes Hysterectomy; Foot surgery (Right, 2009); Appendectomy; Tonsillectomy; Colonoscopy; Endoscopy, colon, diagnostic; Carpal tunnel release (Bilateral); other surgical history (Left, 11/5/2014); Foot surgery (2014); Hammer toe surgery (Left, 03/06/2017); and joint replacement (bilat knee). Social History:  reports that she has quit smoking. She smoked 0.50 packs per day. She has never used smokeless tobacco. She reports that she does not drink alcohol or use drugs. Family History: family history includes Cancer in her brother, father, sister, and son; Heart Disease in her mother. The patients home medications have been reviewed. Allergies: Food; Iodine; Darvocet [propoxyphene n-acetaminophen]; Influenza vaccines; Lamictal [lamotrigine]; Other; Pentazocine lactate; and Talwin [pentazocine]    -------------------------------------------------- RESULTS -------------------------------------------------  Labs:  No results found for this visit on 04/09/19. Radiology:  CT ABDOMEN PELVIS WO CONTRAST Additional Contrast? Oral   Final Result   1. No acute inflammatory changes omental mesenteric fat planes, free   intraperitoneal air, ascites or bowel obstruction. 2. Small midline umbilical hernia with omental fat contents, no   indication for incarceration. 3. No obstructive uropathy. 4. No dilatation of the biliary tree/pancreatic ductal system. ------------------------- NURSING NOTES AND VITALS REVIEWED ---------------------------  Date / Time Roomed:  4/9/2019  4:14 PM  ED Bed Assignment:  16/16    The nursing notes within the ED encounter and vital signs as below have been reviewed.    BP (!) 140/76   Pulse 86   Temp 98.6 °F (37 °C) (Oral)   Resp 16   Ht 5' 5\" (1.651 m)   SpO2 96%   BMI 73.39 kg/m²   Oxygen Saturation Interpretation: Normal      ------------------------------------------ PROGRESS NOTES ------------------------------------------  6:54 PM  I have spoken with the

## 2019-04-10 ENCOUNTER — TELEPHONE (OUTPATIENT)
Dept: ADMINISTRATIVE | Age: 69
End: 2019-04-10

## 2019-04-10 NOTE — TELEPHONE ENCOUNTER
Patient called in to schedule her hospital follow up appt. with Dr. Flor Hudson for Umbilical Hernia. Patient wanted seen closer to [facility]. Scheduled her at [facility].

## 2019-05-02 ENCOUNTER — OFFICE VISIT (OUTPATIENT)
Dept: SURGERY | Age: 69
End: 2019-05-02
Payer: COMMERCIAL

## 2019-05-02 VITALS
DIASTOLIC BLOOD PRESSURE: 75 MMHG | WEIGHT: 293 LBS | BODY MASS INDEX: 48.82 KG/M2 | TEMPERATURE: 98.6 F | SYSTOLIC BLOOD PRESSURE: 125 MMHG | HEIGHT: 65 IN | OXYGEN SATURATION: 96 % | HEART RATE: 107 BPM

## 2019-05-02 DIAGNOSIS — R10.13 EPIGASTRIC PAIN: ICD-10-CM

## 2019-05-02 DIAGNOSIS — K42.9 UMBILICAL HERNIA WITHOUT OBSTRUCTION AND WITHOUT GANGRENE: Primary | ICD-10-CM

## 2019-05-02 PROCEDURE — 1123F ACP DISCUSS/DSCN MKR DOCD: CPT | Performed by: SURGERY

## 2019-05-02 PROCEDURE — 1090F PRES/ABSN URINE INCON ASSESS: CPT | Performed by: SURGERY

## 2019-05-02 PROCEDURE — 99203 OFFICE O/P NEW LOW 30 MIN: CPT | Performed by: SURGERY

## 2019-05-02 PROCEDURE — 1036F TOBACCO NON-USER: CPT | Performed by: SURGERY

## 2019-05-02 PROCEDURE — G8417 CALC BMI ABV UP PARAM F/U: HCPCS | Performed by: SURGERY

## 2019-05-02 PROCEDURE — 3017F COLORECTAL CA SCREEN DOC REV: CPT | Performed by: SURGERY

## 2019-05-02 PROCEDURE — 4040F PNEUMOC VAC/ADMIN/RCVD: CPT | Performed by: SURGERY

## 2019-05-02 PROCEDURE — G8427 DOCREV CUR MEDS BY ELIG CLIN: HCPCS | Performed by: SURGERY

## 2019-05-02 PROCEDURE — G8400 PT W/DXA NO RESULTS DOC: HCPCS | Performed by: SURGERY

## 2019-05-02 RX ORDER — PANTOPRAZOLE SODIUM 20 MG/1
20 TABLET, DELAYED RELEASE ORAL DAILY
Qty: 30 TABLET | Refills: 3 | Status: ON HOLD
Start: 2019-05-02 | End: 2020-09-22 | Stop reason: HOSPADM

## 2019-05-02 NOTE — PROGRESS NOTES
[address] [facility] Surgery Clinic Note    Assessment/Plan:      Diagnosis Orders   1. Umbilical hernia without obstruction and without gangrene      This is not the cause of her pain, as it is not near where her symptoms are. Even if so, her weight alone (BMI 81) would be prohibitive, without considering PMH   2. Epigastric pain      Recommended PPI. could benefit from at least upper scope, but is seeing bariatrics soon she says. Will defer to them. Return if symptoms worsen or fail to improve. Chief Complaint   Patient presents with    New Patient     new patient from ED for umbilicia hernia. PCP: Giuseppe Burks MD    HPI: Ronny Strange is a 71 y.o. female who presents in consultation for upper abdominal lower chest pain. She complains of a \"Sanchez horse sensation. \"  She says it has been getting worse lately. There is no change with food. This began a few weeks ago and she went to the emergency room. She had a CT showing a periumbilical hernia. Her bowels move okay without any diarrhea, constipation, blood. She says she is up-to-date on colonoscopy and her last was normal per her report. She is having respiratory issues and may be going back on oxygen soon. She sees pulmonology, Dr. Ronald Carpenter next week. She states that she is also seeing bariatric surgery at [facility]. Lacie in [facility] [employer] soon. Past Medical History:   Diagnosis Date    Arthritis     Cerebral artery occlusion with cerebral infarction [facility]) 1998?    tia     Cigarette nicotine dependence without complication 8/02/1811    COPD (chronic obstructive pulmonary disease) ([facility] [address]     Depression 10/2014    \"doing ok\". anxiety    Diabetes mellitus ([facility] [address]     prediabetic    GERD (gastroesophageal reflux disease)     Hyperlipidemia     Hypertension     Hypothyroidism     Obese 10/2014    states weight aprox. 400 lbs.     SHALA (obstructive sleep apnea)     no treatment    Palate mass        Past Surgical History:   Procedure Laterality Date  APPENDECTOMY      CARPAL TUNNEL RELEASE Bilateral     17 years ago    COLONOSCOPY      ENDOSCOPY, COLON, DIAGNOSTIC      FOOT SURGERY Right 2009    FOOT SURGERY  2014    reconstructive for hammer toes all four, not big toe left foot    HAMMER TOE SURGERY Left 03/06/2017    HYSTERECTOMY      JOINT REPLACEMENT  bilat knee    OTHER SURGICAL HISTORY Left 11/5/2014    hammer toe correction 2-4left foot and hardware;left calcaneous x2,flexor digital longis repair    TONSILLECTOMY         Prior to Admission medications    Medication Sig Start Date End Date Taking? Authorizing Provider   pantoprazole (PROTONIX) 20 MG tablet Take 1 tablet by mouth daily 5/2/19  Yes Maurice Lee MD   vitamin D (CHOLECALCIFEROL) 1000 UNIT TABS tablet Take 1,000 Units by mouth daily   Yes Historical Provider, MD   LEVOTHYROXINE SODIUM PO Take 0.25 mg by mouth daily   Yes Historical Provider, MD   LEVOTHYROXINE SODIUM PO Take 0.2 mg by mouth daily   Yes Historical Provider, MD   apixaban (ELIQUIS) 5 MG TABS tablet Take 1 tablet by mouth 2 times daily 11/26/18  Yes ELIJAH Lake   metoprolol tartrate (LOPRESSOR) 25 MG tablet Take 1 tablet by mouth 2 times daily Take morning of surgery with a sip of water 11/26/18  Yes ELIJAH Lake   omeprazole (PRILOSEC) 20 MG delayed release capsule Take 20 mg by mouth Daily   Yes Historical Provider, MD   albuterol sulfate  (90 Base) MCG/ACT inhaler Inhale 2 puffs into the lungs every 6 hours as needed for Wheezing   Yes Historical Provider, MD   atorvastatin (LIPITOR) 40 MG tablet Take 40 mg by mouth daily   Yes Historical Provider, MD   amLODIPine (NORVASC) 5 MG tablet Take 5 mg by mouth 2 times daily Take morning of surgery with a sip of water   Yes Historical Provider, MD   traMADol (ULTRAM) 50 MG tablet Take 50 mg by mouth 2 times daily. Yes Historical Provider, MD   pregabalin (LYRICA) 75 MG capsule Take 75 mg by mouth 2 times daily.    Yes Historical Provider, MD HYDROcodone-acetaminophen (NORCO)  MG per tablet Take 2 tablets by mouth every 8 hours as needed for Pain. Yes Historical Provider, MD   miconazole (MICOTIN) 2 % powder Apply topically 2 times daily. Apply to reddened areas. Substituted for Nystatin (MICOSTATIN) powder. 1/12/18  Yes BRENDA Marshall CNP   cetirizine (ZYRTEC) 5 MG tablet Take 5 mg by mouth daily   Yes Historical Provider, MD   cyclobenzaprine (FLEXERIL) 5 MG tablet Take 10 mg by mouth daily    Yes Historical Provider, MD   hydrochlorothiazide (MICROZIDE) 12.5 MG capsule Take 12.5 mg by mouth every morning    Yes Historical Provider, MD   docusate sodium (COLACE) 100 MG capsule Take 100 mg by mouth 2 times daily    Yes Historical Provider, MD   aspirin 81 MG tablet Take 81 mg by mouth daily LD 3/7/18 per surgeon   Yes Historical Provider, MD       Allergies   Allergen Reactions    Food Shortness Of Breath, Swelling and Rash     Fresh peaches, lobster, crab legs.  Iodine Hives and Shortness Of Breath    Darvocet [Propoxyphene N-Acetaminophen] Hives    Influenza Vaccines Hives    Lamictal [Lamotrigine]     Other Hives     Pneumonia vaccine. \"sheets\" , detergent.  Pentazocine Lactate Hives    Talwin [Pentazocine] Nausea And Vomiting       Social History     Socioeconomic History    Marital status:       Spouse name: None    Number of children: None    Years of education: None    Highest education level: None   Occupational History    None   Social Needs    Financial resource strain: None    Food insecurity:     Worry: None     Inability: None    Transportation needs:     Medical: None     Non-medical: None   Tobacco Use    Smoking status: Former Smoker     Packs/day: 0.50    Smokeless tobacco: Never Used   Substance and Sexual Activity    Alcohol use: No    Drug use: No    Sexual activity: None   Lifestyle    Physical activity:     Days per week: None     Minutes per session: None    Stress: None Relationships    Social connections:     Talks on phone: None     Gets together: None     Attends Holiness service: None     Active member of club or organization: None     Attends meetings of clubs or organizations: None     Relationship status: None    Intimate partner violence:     Fear of current or ex partner: None     Emotionally abused: None     Physically abused: None     Forced sexual activity: None   Other Topics Concern    None   Social History Narrative    None       Family History   Problem Relation Age of Onset    Heart Disease Mother     Cancer Father     Cancer Sister     Cancer Brother     Cancer Son        Review of Systems   All other systems reviewed and are negative. Objective:  Vitals:    05/02/19 1323   BP: 125/75   Site: Right Upper Arm   Position: Sitting   Cuff Size: Medium Adult   Pulse: 107   Temp: 98.6 °F (37 °C)   TempSrc: Oral   SpO2: 96%   Weight: (!) 489 lb (221.8 kg)   Height: 5' 5\" (1.651 m)          Physical Exam   Constitutional: She is oriented to person, place, and time. HENT:   Head: Normocephalic and atraumatic. Eyes: Right eye exhibits no discharge. Left eye exhibits no discharge. Neck: No tracheal deviation present. Cardiovascular: Normal rate. Pulmonary/Chest: Effort normal. No respiratory distress. Abdominal: Soft. She exhibits no distension. There is tenderness (Mild epigastric tenderness). There is no rebound and no guarding. Morbidly obese   Neurological: She is alert and oriented to person, place, and time. Skin: Skin is warm and dry. Shawnee Ozuna MD  5/2/19    NOTE: This report, in part or full,may have been transcribed using voice recognition software. Every effort was made to ensure accuracy; however, inadvertent computerized transcription errors may be present. Please excuse any transcriptional grammatical or spelling errors that may have escaped my editorial review.     CC: Rosalee Brown MD

## 2019-08-12 ENCOUNTER — HOSPITAL ENCOUNTER (OUTPATIENT)
Age: 69
Discharge: HOME | End: 2019-08-14
Payer: COMMERCIAL

## 2019-08-12 PROCEDURE — 87075 CULTR BACTERIA EXCEPT BLOOD: CPT

## 2019-08-12 PROCEDURE — 87186 SC STD MICRODIL/AGAR DIL: CPT

## 2019-08-12 PROCEDURE — 87070 CULTURE OTHR SPECIMN AEROBIC: CPT

## 2019-08-12 PROCEDURE — 87205 SMEAR GRAM STAIN: CPT

## 2019-08-13 LAB — GRAM STAIN ORDERABLE: NORMAL

## 2019-08-15 LAB — ANAEROBIC CULTURE: NORMAL

## 2019-08-17 LAB
ORGANISM: ABNORMAL
WOUND/ABSCESS: ABNORMAL
WOUND/ABSCESS: ABNORMAL

## 2019-09-08 ENCOUNTER — APPOINTMENT (OUTPATIENT)
Dept: CT IMAGING | Age: 69
End: 2019-09-08
Payer: COMMERCIAL

## 2019-09-08 ENCOUNTER — APPOINTMENT (OUTPATIENT)
Dept: ULTRASOUND IMAGING | Age: 69
End: 2019-09-08
Payer: COMMERCIAL

## 2019-09-08 ENCOUNTER — HOSPITAL ENCOUNTER (EMERGENCY)
Age: 69
Discharge: HOME | End: 2019-09-08
Attending: EMERGENCY MEDICINE
Payer: COMMERCIAL

## 2019-09-08 ENCOUNTER — APPOINTMENT (OUTPATIENT)
Dept: GENERAL RADIOLOGY | Age: 69
End: 2019-09-08
Payer: COMMERCIAL

## 2019-09-08 VITALS
WEIGHT: 293 LBS | HEART RATE: 90 BPM | OXYGEN SATURATION: 96 % | DIASTOLIC BLOOD PRESSURE: 54 MMHG | HEIGHT: 65 IN | TEMPERATURE: 98 F | RESPIRATION RATE: 14 BRPM | SYSTOLIC BLOOD PRESSURE: 124 MMHG | BODY MASS INDEX: 48.82 KG/M2

## 2019-09-08 DIAGNOSIS — R04.2 HEMOPTYSIS: ICD-10-CM

## 2019-09-08 DIAGNOSIS — J44.1 COPD EXACERBATION (HCC): Primary | ICD-10-CM

## 2019-09-08 LAB
ANION GAP SERPL CALCULATED.3IONS-SCNC: 13 MMOL/L (ref 7–16)
B.E.: -0.2 MMOL/L (ref -3–3)
BASOPHILS ABSOLUTE: 0.05 E9/L (ref 0–0.2)
BASOPHILS RELATIVE PERCENT: 0.5 % (ref 0–2)
BUN BLDV-MCNC: 24 MG/DL (ref 8–23)
CALCIUM SERPL-MCNC: 9.5 MG/DL (ref 8.6–10.2)
CHLORIDE BLD-SCNC: 101 MMOL/L (ref 98–107)
CO2: 25 MMOL/L (ref 22–29)
COHB: 1.2 % (ref 0–1.5)
CREAT SERPL-MCNC: 1.4 MG/DL (ref 0.5–1)
CRITICAL: ABNORMAL
D DIMER: 252 NG/ML DDU
DATE ANALYZED: ABNORMAL
DATE OF COLLECTION: ABNORMAL
EOSINOPHILS ABSOLUTE: 0.45 E9/L (ref 0.05–0.5)
EOSINOPHILS RELATIVE PERCENT: 4.2 % (ref 0–6)
GFR AFRICAN AMERICAN: 45
GFR NON-AFRICAN AMERICAN: 37 ML/MIN/1.73
GLUCOSE BLD-MCNC: 195 MG/DL (ref 74–99)
HCO3: 24.7 MMOL/L (ref 22–26)
HCT VFR BLD CALC: 43.5 % (ref 34–48)
HEMOGLOBIN: 13.5 G/DL (ref 11.5–15.5)
HHB: 5.1 % (ref 0–5)
IMMATURE GRANULOCYTES #: 0.09 E9/L
IMMATURE GRANULOCYTES %: 0.8 % (ref 0–5)
LAB: ABNORMAL
LYMPHOCYTES ABSOLUTE: 3.8 E9/L (ref 1.5–4)
LYMPHOCYTES RELATIVE PERCENT: 35.4 % (ref 20–42)
Lab: ABNORMAL
MCH RBC QN AUTO: 27.6 PG (ref 26–35)
MCHC RBC AUTO-ENTMCNC: 31 % (ref 32–34.5)
MCV RBC AUTO: 89 FL (ref 80–99.9)
METHB: 0.1 % (ref 0–1.5)
MODE: ABNORMAL
MONOCYTES ABSOLUTE: 0.74 E9/L (ref 0.1–0.95)
MONOCYTES RELATIVE PERCENT: 6.9 % (ref 2–12)
NEUTROPHILS ABSOLUTE: 5.61 E9/L (ref 1.8–7.3)
NEUTROPHILS RELATIVE PERCENT: 52.2 % (ref 43–80)
O2 CONTENT: 17.8 ML/DL
O2 SATURATION: 94.8 % (ref 92–98.5)
O2HB: 93.6 % (ref 94–97)
OPERATOR ID: 3114
PATIENT TEMP: 37 C
PCO2: 41.3 MMHG (ref 35–45)
PDW BLD-RTO: 14.6 FL (ref 11.5–15)
PH BLOOD GAS: 7.39 (ref 7.35–7.45)
PLATELET # BLD: 226 E9/L (ref 130–450)
PMV BLD AUTO: 9.8 FL (ref 7–12)
PO2: 75.9 MMHG (ref 60–100)
POTASSIUM SERPL-SCNC: 4.3 MMOL/L (ref 3.5–5)
PRO-BNP: 39 PG/ML (ref 0–125)
RBC # BLD: 4.89 E12/L (ref 3.5–5.5)
SODIUM BLD-SCNC: 139 MMOL/L (ref 132–146)
SOURCE, BLOOD GAS: ABNORMAL
THB: 13.5 G/DL (ref 11.5–16.5)
TIME ANALYZED: 1735
TROPONIN: <0.01 NG/ML (ref 0–0.03)
WBC # BLD: 10.7 E9/L (ref 4.5–11.5)

## 2019-09-08 PROCEDURE — 80048 BASIC METABOLIC PNL TOTAL CA: CPT

## 2019-09-08 PROCEDURE — 93005 ELECTROCARDIOGRAM TRACING: CPT | Performed by: EMERGENCY MEDICINE

## 2019-09-08 PROCEDURE — 74176 CT ABD & PELVIS W/O CONTRAST: CPT

## 2019-09-08 PROCEDURE — 6370000000 HC RX 637 (ALT 250 FOR IP): Performed by: EMERGENCY MEDICINE

## 2019-09-08 PROCEDURE — 71045 X-RAY EXAM CHEST 1 VIEW: CPT

## 2019-09-08 PROCEDURE — 94640 AIRWAY INHALATION TREATMENT: CPT

## 2019-09-08 PROCEDURE — 99284 EMERGENCY DEPT VISIT MOD MDM: CPT

## 2019-09-08 PROCEDURE — 36415 COLL VENOUS BLD VENIPUNCTURE: CPT

## 2019-09-08 PROCEDURE — 85378 FIBRIN DEGRADE SEMIQUANT: CPT

## 2019-09-08 PROCEDURE — 82805 BLOOD GASES W/O2 SATURATION: CPT

## 2019-09-08 PROCEDURE — 93970 EXTREMITY STUDY: CPT

## 2019-09-08 PROCEDURE — 94664 DEMO&/EVAL PT USE INHALER: CPT

## 2019-09-08 PROCEDURE — 2580000003 HC RX 258: Performed by: EMERGENCY MEDICINE

## 2019-09-08 PROCEDURE — 83880 ASSAY OF NATRIURETIC PEPTIDE: CPT

## 2019-09-08 PROCEDURE — 84484 ASSAY OF TROPONIN QUANT: CPT

## 2019-09-08 PROCEDURE — 85025 COMPLETE CBC W/AUTO DIFF WBC: CPT

## 2019-09-08 RX ORDER — PREDNISONE 20 MG/1
40 TABLET ORAL DAILY
Qty: 10 TABLET | Refills: 0 | Status: SHIPPED | OUTPATIENT
Start: 2019-09-08 | End: 2019-09-13

## 2019-09-08 RX ORDER — AZITHROMYCIN 250 MG/1
250 TABLET, FILM COATED ORAL SEE ADMIN INSTRUCTIONS
Qty: 6 TABLET | Refills: 0 | Status: SHIPPED | OUTPATIENT
Start: 2019-09-08 | End: 2019-09-13

## 2019-09-08 RX ORDER — IPRATROPIUM BROMIDE AND ALBUTEROL SULFATE 2.5; .5 MG/3ML; MG/3ML
1 SOLUTION RESPIRATORY (INHALATION)
Status: COMPLETED | OUTPATIENT
Start: 2019-09-08 | End: 2019-09-08

## 2019-09-08 RX ORDER — 0.9 % SODIUM CHLORIDE 0.9 %
1000 INTRAVENOUS SOLUTION INTRAVENOUS ONCE
Status: COMPLETED | OUTPATIENT
Start: 2019-09-08 | End: 2019-09-08

## 2019-09-08 RX ADMIN — IPRATROPIUM BROMIDE AND ALBUTEROL SULFATE 1 AMPULE: .5; 3 SOLUTION RESPIRATORY (INHALATION) at 12:28

## 2019-09-08 RX ADMIN — IPRATROPIUM BROMIDE AND ALBUTEROL SULFATE 1 AMPULE: .5; 3 SOLUTION RESPIRATORY (INHALATION) at 12:27

## 2019-09-08 RX ADMIN — IPRATROPIUM BROMIDE AND ALBUTEROL SULFATE 1 AMPULE: .5; 3 SOLUTION RESPIRATORY (INHALATION) at 12:26

## 2019-09-08 RX ADMIN — SODIUM CHLORIDE 1000 ML: 9 INJECTION, SOLUTION INTRAVENOUS at 15:35

## 2019-09-08 ASSESSMENT — ENCOUNTER SYMPTOMS
SORE THROAT: 0
ABDOMINAL PAIN: 0
EYE DISCHARGE: 0
WHEEZING: 1
SHORTNESS OF BREATH: 1
EYE PAIN: 0
VOMITING: 0
COUGH: 1
ABDOMINAL DISTENTION: 0
BACK PAIN: 0
DIARRHEA: 0
NAUSEA: 0
EYE REDNESS: 0
SINUS PRESSURE: 0

## 2019-09-08 ASSESSMENT — PAIN DESCRIPTION - DESCRIPTORS: DESCRIPTORS: SORE

## 2019-09-08 ASSESSMENT — PAIN DESCRIPTION - LOCATION: LOCATION: CHEST

## 2019-09-08 ASSESSMENT — PAIN DESCRIPTION - PAIN TYPE: TYPE: ACUTE PAIN

## 2019-09-08 ASSESSMENT — PAIN DESCRIPTION - FREQUENCY: FREQUENCY: CONTINUOUS

## 2019-09-08 ASSESSMENT — PAIN SCALES - GENERAL: PAINLEVEL_OUTOF10: 5

## 2019-09-09 LAB
EKG ATRIAL RATE: 87 BPM
EKG P AXIS: 54 DEGREES
EKG P-R INTERVAL: 178 MS
EKG Q-T INTERVAL: 350 MS
EKG QRS DURATION: 88 MS
EKG QTC CALCULATION (BAZETT): 421 MS
EKG R AXIS: 17 DEGREES
EKG T AXIS: 62 DEGREES
EKG VENTRICULAR RATE: 87 BPM

## 2019-09-09 PROCEDURE — 93010 ELECTROCARDIOGRAM REPORT: CPT | Performed by: INTERNAL MEDICINE

## 2019-09-11 ENCOUNTER — HOSPITAL ENCOUNTER (OUTPATIENT)
Dept: SLEEP CENTER | Age: 69
Discharge: HOME | End: 2019-09-11
Payer: COMMERCIAL

## 2019-09-11 DIAGNOSIS — Z99.89 OSA ON CPAP: Primary | Chronic | ICD-10-CM

## 2019-09-11 DIAGNOSIS — I48.91 ATRIAL FIBRILLATION, UNSPECIFIED TYPE (HCC): ICD-10-CM

## 2019-09-11 DIAGNOSIS — G47.33 OSA ON CPAP: Primary | Chronic | ICD-10-CM

## 2019-09-11 DIAGNOSIS — E66.01 MORBID OBESITY WITH BMI OF 45.0-49.9, ADULT (HCC): Chronic | ICD-10-CM

## 2019-09-11 DIAGNOSIS — E66.2 OBESITY HYPOVENTILATION SYNDROME (HCC): Chronic | ICD-10-CM

## 2019-09-11 DIAGNOSIS — J44.9 CHRONIC OBSTRUCTIVE PULMONARY DISEASE, UNSPECIFIED COPD TYPE (HCC): Chronic | ICD-10-CM

## 2019-11-06 ENCOUNTER — HOSPITAL ENCOUNTER (OUTPATIENT)
Dept: SLEEP CENTER | Age: 69
Discharge: HOME | End: 2019-11-06
Payer: COMMERCIAL

## 2019-11-06 DIAGNOSIS — G47.33 OSA ON CPAP: Chronic | ICD-10-CM

## 2019-11-06 DIAGNOSIS — I48.91 ATRIAL FIBRILLATION, UNSPECIFIED TYPE (HCC): ICD-10-CM

## 2019-11-06 DIAGNOSIS — Z99.89 OSA ON CPAP: Chronic | ICD-10-CM

## 2019-11-06 DIAGNOSIS — J44.9 CHRONIC OBSTRUCTIVE PULMONARY DISEASE, UNSPECIFIED COPD TYPE (HCC): Chronic | ICD-10-CM

## 2019-11-06 DIAGNOSIS — E66.01 MORBID OBESITY WITH BMI OF 45.0-49.9, ADULT (HCC): Chronic | ICD-10-CM

## 2019-11-06 DIAGNOSIS — E66.2 OBESITY HYPOVENTILATION SYNDROME (HCC): Chronic | ICD-10-CM

## 2019-11-06 PROCEDURE — 95811 POLYSOM 6/>YRS CPAP 4/> PARM: CPT

## 2019-11-07 VITALS
DIASTOLIC BLOOD PRESSURE: 64 MMHG | HEIGHT: 64 IN | HEART RATE: 83 BPM | SYSTOLIC BLOOD PRESSURE: 100 MMHG | BODY MASS INDEX: 50.02 KG/M2 | WEIGHT: 293 LBS | OXYGEN SATURATION: 92 %

## 2019-11-11 ENCOUNTER — OFFICE VISIT (OUTPATIENT)
Dept: CARDIOLOGY CLINIC | Age: 69
End: 2019-11-11
Payer: COMMERCIAL

## 2019-11-11 VITALS
HEART RATE: 84 BPM | RESPIRATION RATE: 14 BRPM | HEIGHT: 65 IN | BODY MASS INDEX: 48.82 KG/M2 | WEIGHT: 293 LBS | SYSTOLIC BLOOD PRESSURE: 102 MMHG | DIASTOLIC BLOOD PRESSURE: 76 MMHG

## 2019-11-11 DIAGNOSIS — R06.09 DOE (DYSPNEA ON EXERTION): ICD-10-CM

## 2019-11-11 DIAGNOSIS — I48.0 PAROXYSMAL ATRIAL FIBRILLATION (HCC): ICD-10-CM

## 2019-11-11 DIAGNOSIS — I10 HYPERTENSION, UNSPECIFIED TYPE: ICD-10-CM

## 2019-11-11 DIAGNOSIS — R07.9 CHEST PAIN, UNSPECIFIED TYPE: Primary | ICD-10-CM

## 2019-11-11 PROCEDURE — G8427 DOCREV CUR MEDS BY ELIG CLIN: HCPCS | Performed by: INTERNAL MEDICINE

## 2019-11-11 PROCEDURE — 3017F COLORECTAL CA SCREEN DOC REV: CPT | Performed by: INTERNAL MEDICINE

## 2019-11-11 PROCEDURE — 1123F ACP DISCUSS/DSCN MKR DOCD: CPT | Performed by: INTERNAL MEDICINE

## 2019-11-11 PROCEDURE — 93000 ELECTROCARDIOGRAM COMPLETE: CPT | Performed by: INTERNAL MEDICINE

## 2019-11-11 PROCEDURE — G8400 PT W/DXA NO RESULTS DOC: HCPCS | Performed by: INTERNAL MEDICINE

## 2019-11-11 PROCEDURE — 1036F TOBACCO NON-USER: CPT | Performed by: INTERNAL MEDICINE

## 2019-11-11 PROCEDURE — 99214 OFFICE O/P EST MOD 30 MIN: CPT | Performed by: INTERNAL MEDICINE

## 2019-11-11 PROCEDURE — G8417 CALC BMI ABV UP PARAM F/U: HCPCS | Performed by: INTERNAL MEDICINE

## 2019-11-11 PROCEDURE — 1090F PRES/ABSN URINE INCON ASSESS: CPT | Performed by: INTERNAL MEDICINE

## 2019-11-11 PROCEDURE — 4040F PNEUMOC VAC/ADMIN/RCVD: CPT | Performed by: INTERNAL MEDICINE

## 2019-11-11 PROCEDURE — G8484 FLU IMMUNIZE NO ADMIN: HCPCS | Performed by: INTERNAL MEDICINE

## 2019-11-15 ENCOUNTER — TELEPHONE (OUTPATIENT)
Dept: CARDIOLOGY | Age: 69
End: 2019-11-15

## 2019-11-18 ENCOUNTER — HOSPITAL ENCOUNTER (OUTPATIENT)
Dept: GENERAL RADIOLOGY | Age: 69
Discharge: HOME | End: 2019-11-20
Payer: COMMERCIAL

## 2019-11-18 DIAGNOSIS — Z12.31 VISIT FOR SCREENING MAMMOGRAM: ICD-10-CM

## 2019-11-18 PROCEDURE — 77063 BREAST TOMOSYNTHESIS BI: CPT

## 2019-11-19 ENCOUNTER — TELEPHONE (OUTPATIENT)
Dept: CARDIOLOGY CLINIC | Age: 69
End: 2019-11-19

## 2019-11-19 ENCOUNTER — HOSPITAL ENCOUNTER (OUTPATIENT)
Dept: CARDIOLOGY | Age: 69
Discharge: HOME | End: 2019-11-19
Payer: COMMERCIAL

## 2019-11-19 VITALS
WEIGHT: 293 LBS | SYSTOLIC BLOOD PRESSURE: 110 MMHG | HEART RATE: 92 BPM | BODY MASS INDEX: 48.82 KG/M2 | HEIGHT: 65 IN | DIASTOLIC BLOOD PRESSURE: 74 MMHG

## 2019-11-19 DIAGNOSIS — R07.9 CHEST PAIN, UNSPECIFIED TYPE: ICD-10-CM

## 2019-11-19 PROCEDURE — A9500 TC99M SESTAMIBI: HCPCS | Performed by: INTERNAL MEDICINE

## 2019-11-19 PROCEDURE — 3430000000 HC RX DIAGNOSTIC RADIOPHARMACEUTICAL: Performed by: INTERNAL MEDICINE

## 2019-11-19 PROCEDURE — 2580000003 HC RX 258: Performed by: INTERNAL MEDICINE

## 2019-11-19 PROCEDURE — 78452 HT MUSCLE IMAGE SPECT MULT: CPT

## 2019-11-19 PROCEDURE — 6360000002 HC RX W HCPCS: Performed by: INTERNAL MEDICINE

## 2019-11-19 PROCEDURE — 93017 CV STRESS TEST TRACING ONLY: CPT

## 2019-11-19 RX ORDER — SODIUM CHLORIDE 0.9 % (FLUSH) 0.9 %
10 SYRINGE (ML) INJECTION PRN
Status: DISCONTINUED | OUTPATIENT
Start: 2019-11-19 | End: 2019-11-20 | Stop reason: HOSPADM

## 2019-11-19 RX ORDER — LISINOPRIL 10 MG/1
10 TABLET ORAL DAILY
Status: ON HOLD | COMMUNITY
End: 2020-09-21 | Stop reason: HOSPADM

## 2019-11-19 RX ADMIN — Medication 31.7 MILLICURIE: at 10:06

## 2019-11-19 RX ADMIN — Medication 10 ML: at 10:07

## 2019-11-19 RX ADMIN — REGADENOSON 0.4 MG: 0.08 INJECTION, SOLUTION INTRAVENOUS at 10:06

## 2019-11-19 RX ADMIN — Medication 10 ML: at 08:04

## 2019-11-19 RX ADMIN — Medication 10.1 MILLICURIE: at 08:04

## 2019-11-19 RX ADMIN — Medication 10 ML: at 10:06

## 2020-02-18 ENCOUNTER — HOSPITAL ENCOUNTER (OUTPATIENT)
Dept: WOUND CARE | Age: 70
Discharge: HOME | End: 2020-02-18
Payer: COMMERCIAL

## 2020-02-18 VITALS
RESPIRATION RATE: 20 BRPM | DIASTOLIC BLOOD PRESSURE: 68 MMHG | HEART RATE: 80 BPM | TEMPERATURE: 97.1 F | HEIGHT: 65 IN | WEIGHT: 293 LBS | BODY MASS INDEX: 48.82 KG/M2 | SYSTOLIC BLOOD PRESSURE: 132 MMHG

## 2020-02-18 PROBLEM — S31.109A OPEN ABDOMINAL WALL WOUND: Chronic | Status: ACTIVE | Noted: 2020-02-18

## 2020-02-18 PROCEDURE — 11042 DBRDMT SUBQ TIS 1ST 20SQCM/<: CPT | Performed by: SURGERY

## 2020-02-18 PROCEDURE — 11042 DBRDMT SUBQ TIS 1ST 20SQCM/<: CPT

## 2020-02-18 PROCEDURE — 99203 OFFICE O/P NEW LOW 30 MIN: CPT | Performed by: SURGERY

## 2020-02-18 PROCEDURE — 99212 OFFICE O/P EST SF 10 MIN: CPT

## 2020-02-18 RX ORDER — FLUTICASONE FUROATE AND VILANTEROL 100; 25 UG/1; UG/1
1 POWDER RESPIRATORY (INHALATION) DAILY
COMMUNITY

## 2020-02-18 RX ORDER — POLYMYXIN B SULFATE AND TRIMETHOPRIM 1; 10000 MG/ML; [USP'U]/ML
1 SOLUTION OPHTHALMIC EVERY 6 HOURS PRN
COMMUNITY
End: 2021-11-23 | Stop reason: CLARIF

## 2020-02-18 RX ORDER — FLUTICASONE PROPIONATE 50 MCG
1 SPRAY, SUSPENSION (ML) NASAL DAILY PRN
COMMUNITY

## 2020-02-18 RX ORDER — ALBUTEROL SULFATE 2.5 MG/3ML
2.5 SOLUTION RESPIRATORY (INHALATION) DAILY PRN
COMMUNITY

## 2020-02-18 NOTE — PROGRESS NOTES
Wound Healing Center /Hyperbarics   History and Physical/Consultation  General Surgery/Medicine    Referring Physician : Bianca Noguera MD  Eduard Kenney  MEDICAL RECORD NUMBER:  [id number]  AGE: 71 y.o. GENDER: female  : 1950  EPISODE DATE:  2020  Subjective:     Chief Complaint   Patient presents with    Wound Check     mid abdomen lower         HISTORY of PRESENT ILLNESS HPI     Eduard Kenney is a 71 y.o. female who presents today for wound/ulcer evaluation. History of Wound Context:  The patient has had a wound of the abdominal region which was first noted approximately several months ago. This has been treated by her family. On their initial visit to the wound healing center, 20, the patient has noted that the wound has been improving. The patient has not had similar previous wounds in the past.    He describes his abdominal wound as stated for several months. She has been treated by her daughter. Overall the wound is decreasing in size. She is morbidly obese and she is planning a gastric sleeve. The wound is in between 2 folds of her abdominal wall in the midline. She denies any fever chills she denies any chest pain shortness of breath or discomfort. She is not on antibiotics. She has minimal pain or discomfort rates on a scale of 1-10 approximately 2 worse with some pressure and dressing changes. Pt is currently not on abx.       Wound/Ulcer Pain Timing/Severity: intermittent  Quality of pain: dull  Severity:  2 / 10   Modifying Factors: dressing changes  Associated Signs/Symptoms: pain    Ulcer Identification:  Ulcer Type: traumatic and skin tear  Contributing Factors: edema, diabetes, chronic pressure, decreased mobility, shear force and obesity    Diabetic/Pressure/Non Pressure Ulcers only:  Ulcer: Non-Pressure ulcer, fat layer exposed    If patient has diabetic lower extremity wounds  Ivey Classification of diabetic lower extremity wounds:    Grade Description   []  0 (FLONASE) 50 MCG/ACT nasal spray 1 spray by Each Nostril route daily as needed for Rhinitis       No current facility-administered medications on file prior to encounter. REVIEW OF SYSTEMS   ROS : All others Negative if blank [], Positive if [x]  General Urinary   [] Fevers [] Hematuria   [] Chills [] Dysuria   [] Weight Loss Neurolgoic   Skin [] Stroke/TIA   [] Tissue Loss [] Focal weakness   Eyes [] Slurred Speech   [] Wears Glasses/Contacts ENT   [] Vision Changes [] Difficulty swallowing   Respiratory Endocrine    [] Shortness of breath [] Increased Thirst   Cardiovascular Gastrointestinal   [] Chest Pain [] Abdominal Pain   [] Shortness of breath with exertion [] Melena    [] Hematochezia     Objective:    /68   Pulse 80   Temp 97.1 °F (36.2 °C) (Temporal)   Resp 20   Ht 5' 5\" (1.651 m)   Wt (!) 539 lb (244.5 kg)   BMI 89.69 kg/m²   Wt Readings from Last 3 Encounters:   02/18/20 (!) 539 lb (244.5 kg)   11/19/19 (!) 457 lb (207.3 kg)   11/11/19 (!) 457 lb (207.3 kg)       PHYSICAL EXAM  CONSTITUTIONAL:   Awake, alert, cooperative   PSYCHIATRIC :  Oriented to time, place and person      normal insight to disease process  ENT:  External ears and nose without lesions    Hearing deficit is not noted  NECK: Supple, symmetrical, trachea midline    Thyroid goiter not appreciated   LUNGS:  No increased work of breathing                  Clear to auscultation bilaterally   CARDIOVASCULAR:  regular rate and rhythm   ABDOMEN:  soft, non-distended, non-tender there is midline abdominal tear. See below  Lymphatics : Cervical lymphadenopathy is not noted   SKIN:   Skin color is abnormal with a skin tear in the mid line   Texture and turgor is  normal   Induration is not noted  EXTREMITIES:   R UE Edema is not noted  L UE Edema is not noted  R LE she has lower extremity boots on.   Will take them off  L LE she has lower extremity boots on and will not take them off  Assessment:     Problem List Items Surface Area Debrided:  1.02 sq cm     Estimated Blood Loss:  Minimal  Hemostasis Achieved:  by pressure    Procedural Pain:  0  / 10   Post Procedural Pain:  0 / 10     Response to treatment:  Well tolerated by patient. A culture was not done. Plan:     Pt is not a smoker   - Discussed relationship of smoking and negative affects on wound healing       In my professional opinion and based off the information that is available at this time this patient has appropriate indication for HBO Therapy: NA    Treatment Note please see attached Discharge Instructions    Written patient dismissal instructions given to patient and signed by patient or POA.            Electronically signed by Carl Ruiz MD on 2/18/2020 at 9:29 AM

## 2020-02-25 ENCOUNTER — HOSPITAL ENCOUNTER (OUTPATIENT)
Dept: WOUND CARE | Age: 70
Discharge: HOME | End: 2020-02-25
Payer: COMMERCIAL

## 2020-03-03 ENCOUNTER — HOSPITAL ENCOUNTER (OUTPATIENT)
Dept: WOUND CARE | Age: 70
Discharge: HOME | End: 2020-03-03
Payer: COMMERCIAL

## 2020-03-10 ENCOUNTER — HOSPITAL ENCOUNTER (OUTPATIENT)
Dept: WOUND CARE | Age: 70
Discharge: HOME | End: 2020-03-10
Payer: COMMERCIAL

## 2020-03-10 VITALS
WEIGHT: 293 LBS | DIASTOLIC BLOOD PRESSURE: 60 MMHG | HEART RATE: 80 BPM | BODY MASS INDEX: 48.82 KG/M2 | SYSTOLIC BLOOD PRESSURE: 104 MMHG | TEMPERATURE: 97.7 F | RESPIRATION RATE: 20 BRPM | HEIGHT: 65 IN

## 2020-03-10 PROCEDURE — 99212 OFFICE O/P EST SF 10 MIN: CPT | Performed by: SURGERY

## 2020-03-10 PROCEDURE — 99213 OFFICE O/P EST LOW 20 MIN: CPT

## 2020-03-10 RX ORDER — LIDOCAINE HYDROCHLORIDE 40 MG/ML
SOLUTION TOPICAL ONCE
Status: DISCONTINUED | OUTPATIENT
Start: 2020-03-10 | End: 2020-03-10

## 2020-03-10 NOTE — PROGRESS NOTES
ago    COLONOSCOPY      ENDOSCOPY, COLON, DIAGNOSTIC      FOOT SURGERY Right 2009    FOOT SURGERY  2014    reconstructive for hammer toes all four, not big toe left foot    HAMMER TOE SURGERY Left 03/06/2017    HYSTERECTOMY      JOINT REPLACEMENT  bilat knee    OTHER SURGICAL HISTORY Left 11/5/2014    hammer toe correction 2-4left foot and hardware;left calcaneous x2,flexor digital longis repair    TONSILLECTOMY       Family History   Problem Relation Age of Onset    Heart Disease Mother     Cancer Father     Cancer Sister     Cancer Brother     Cancer Son      Social History     Tobacco Use    Smoking status: Former Smoker     Types: Cigarettes, Cigars    Smokeless tobacco: Never Used    Tobacco comment: quit 2018   Substance Use Topics    Alcohol use: No    Drug use: No     Allergies   Allergen Reactions    Food Shortness Of Breath, Swelling and Rash     Fresh peaches, lobster, crab legs.  Iodine Hives and Shortness Of Breath    Darvocet [Propoxyphene N-Acetaminophen] Hives    Influenza Vaccines Hives    Lamictal [Lamotrigine]     Other Hives     Pneumonia vaccine. \"sheets\" , detergent.           Pentazocine Lactate Hives    Talwin [Pentazocine] Nausea And Vomiting     Current Outpatient Medications on File Prior to Encounter   Medication Sig Dispense Refill    albuterol (PROVENTIL) (2.5 MG/3ML) 0.083% nebulizer solution Take 2.5 mg by nebulization daily as needed for Wheezing      fluticasone-vilanterol (BREO ELLIPTA) 100-25 MCG/INH AEPB inhaler Inhale 1 puff into the lungs daily      fluticasone (FLONASE) 50 MCG/ACT nasal spray 1 spray by Each Nostril route daily as needed for Rhinitis      trimethoprim-polymyxin b (POLYTRIM) 05447-2.1 UNIT/ML-% ophthalmic solution Place 1 drop into both eyes every 6 hours as needed      lisinopril (PRINIVIL;ZESTRIL) 10 MG tablet Take 10 mg by mouth daily      metFORMIN (GLUCOPHAGE) 500 MG tablet Take 850 mg by mouth daily (with breakfast) Measurements:  Wound/Ulcer Descriptions are Pre Debridement except measurements:     Wound 02/18/20 Abdomen Lower;Mid #1 acquired 12-29-19 (Active)   Wound Image   3/10/2020  8:18 AM   Wound Skin Tear 2/18/2020  9:26 AM   Dressing Changed Changed/New 2/18/2020  9:50 AM   Dressing/Treatment Alginate; Adhesive bandage 2/18/2020  9:50 AM   Wound Cleansed Rinsed/Irrigated with saline 2/18/2020  9:50 AM   Wound Length (cm) 0 cm 3/10/2020  8:18 AM   Wound Width (cm) 0 cm 3/10/2020  8:18 AM   Wound Depth (cm) 0 cm 3/10/2020  8:18 AM   Wound Surface Area (cm^2) 0 cm^2 3/10/2020  8:18 AM   Change in Wound Size % (l*w) 100 3/10/2020  8:18 AM   Wound Volume (cm^3) 0 cm^3 3/10/2020  8:18 AM   Wound Healing % 100 3/10/2020  8:18 AM   Post-Procedure Length (cm) 3.4 cm 2/18/2020  9:33 AM   Post-Procedure Width (cm) 0.4 cm 2/18/2020  9:33 AM   Post-Procedure Depth (cm) 0.2 cm 2/18/2020  9:33 AM   Post-Procedure Surface Area (cm^2) 1.36 cm^2 2/18/2020  9:33 AM   Post-Procedure Volume (cm^3) 0.27 cm^3 2/18/2020  9:33 AM   Wound Assessment Pink;[place];Yellow 2/18/2020  9:26 AM   Drainage Amount Moderate 2/18/2020  9:33 AM   Drainage Description Serosanguinous 2/18/2020  9:33 AM   Odor None 2/18/2020  9:26 AM   [facility]-wound Assessment Pink;Purple 2/18/2020  9:26 AM   Number of days: 20          Procedure Note  Indications:  Based on my examination of this patient's wound(s)/ulcer(s) today, debridement is not required to promote healing and evaluate the wound base. Plan:   Treatment Note please see attached Discharge Instructions    Written patient dismissal instructions given to patient and signed by patient or POA.          Discharge Instructions       Visit Discharge/Physician Orders     Discharge condition: Stable     Assessment of pain at discharge: none      Anesthetic used:   none     Discharge to: Home     Left via:Private automobile     Accompanied by: accompanied by self     ECF/HHA: [address] - healed    Dressing Orders: Abdomen: healed      Treatment Orders: may swim after 1 week    [facility] followup visit _____healed _______________________  (Please note your next appointment above and if you are unable to keep, kindly give a 24 hour notice.  Thank you.)     Physician signature:__________________________        If you experience any of the following, please [facility] during business hours:     * Increase in Pain  * Temperature over 101  * Increase in drainage from your wound  * Drainage with a foul odor  * Bleeding  * Increase in swelling  * Need for compression bandage changes due to slippage, breakthrough drainage.     If you need medical attention outside of the business hours of the [facility] please contact your PCP or go to the nearest emergency room.                 Electronically signed by Abena Martinez MD on 3/10/2020 at 8:46 AM

## 2020-09-16 ENCOUNTER — HOSPITAL ENCOUNTER (EMERGENCY)
Age: 70
Discharge: HOME | DRG: 682 | End: 2020-09-16
Attending: EMERGENCY MEDICINE
Payer: COMMERCIAL

## 2020-09-16 ENCOUNTER — APPOINTMENT (OUTPATIENT)
Dept: GENERAL RADIOLOGY | Age: 70
DRG: 682 | End: 2020-09-16
Payer: COMMERCIAL

## 2020-09-16 VITALS
BODY MASS INDEX: 48.82 KG/M2 | DIASTOLIC BLOOD PRESSURE: 72 MMHG | WEIGHT: 293 LBS | SYSTOLIC BLOOD PRESSURE: 108 MMHG | OXYGEN SATURATION: 96 % | TEMPERATURE: 97.7 F | HEIGHT: 65 IN | HEART RATE: 74 BPM | RESPIRATION RATE: 14 BRPM

## 2020-09-16 LAB
ALBUMIN SERPL-MCNC: 3.4 G/DL (ref 3.5–5.2)
ALP BLD-CCNC: 71 U/L (ref 35–104)
ALT SERPL-CCNC: 16 U/L (ref 0–32)
ANION GAP SERPL CALCULATED.3IONS-SCNC: 7 MMOL/L (ref 7–16)
AST SERPL-CCNC: 15 U/L (ref 0–31)
BACTERIA: ABNORMAL /HPF
BASOPHILS ABSOLUTE: 0.05 E9/L (ref 0–0.2)
BASOPHILS RELATIVE PERCENT: 0.8 % (ref 0–2)
BILIRUB SERPL-MCNC: 0.4 MG/DL (ref 0–1.2)
BILIRUBIN URINE: NEGATIVE
BLOOD, URINE: ABNORMAL
BUN BLDV-MCNC: 13 MG/DL (ref 8–23)
CALCIUM SERPL-MCNC: 9.4 MG/DL (ref 8.6–10.2)
CHLORIDE BLD-SCNC: 105 MMOL/L (ref 98–107)
CLARITY: CLEAR
CO2: 29 MMOL/L (ref 22–29)
COLOR: YELLOW
CREAT SERPL-MCNC: 1.1 MG/DL (ref 0.5–1)
EOSINOPHILS ABSOLUTE: 0.28 E9/L (ref 0.05–0.5)
EOSINOPHILS RELATIVE PERCENT: 4.4 % (ref 0–6)
GFR AFRICAN AMERICAN: 59
GFR NON-AFRICAN AMERICAN: 49 ML/MIN/1.73
GLUCOSE BLD-MCNC: 102 MG/DL (ref 74–99)
GLUCOSE URINE: NEGATIVE MG/DL
HCT VFR BLD CALC: 48.5 % (ref 34–48)
HEMOGLOBIN: 15.2 G/DL (ref 11.5–15.5)
IMMATURE GRANULOCYTES #: 0.02 E9/L
IMMATURE GRANULOCYTES %: 0.3 % (ref 0–5)
KETONES, URINE: NEGATIVE MG/DL
LEUKOCYTE ESTERASE, URINE: ABNORMAL
LYMPHOCYTES ABSOLUTE: 1.86 E9/L (ref 1.5–4)
LYMPHOCYTES RELATIVE PERCENT: 29 % (ref 20–42)
MAGNESIUM: 1.7 MG/DL (ref 1.6–2.6)
MCH RBC QN AUTO: 28.9 PG (ref 26–35)
MCHC RBC AUTO-ENTMCNC: 31.3 % (ref 32–34.5)
MCV RBC AUTO: 92.2 FL (ref 80–99.9)
MONOCYTES ABSOLUTE: 0.46 E9/L (ref 0.1–0.95)
MONOCYTES RELATIVE PERCENT: 7.2 % (ref 2–12)
NEUTROPHILS ABSOLUTE: 3.75 E9/L (ref 1.8–7.3)
NEUTROPHILS RELATIVE PERCENT: 58.3 % (ref 43–80)
NITRITE, URINE: POSITIVE
PDW BLD-RTO: 14.6 FL (ref 11.5–15)
PH UA: 6 (ref 5–9)
PLATELET # BLD: 211 E9/L (ref 130–450)
PMV BLD AUTO: 11.1 FL (ref 7–12)
POTASSIUM SERPL-SCNC: 4.1 MMOL/L (ref 3.5–5)
PROTEIN UA: ABNORMAL MG/DL
RBC # BLD: 5.26 E12/L (ref 3.5–5.5)
RBC UA: ABNORMAL /HPF (ref 0–2)
REASON FOR REJECTION: NORMAL
REJECTED TEST: NORMAL
SODIUM BLD-SCNC: 141 MMOL/L (ref 132–146)
SPECIFIC GRAVITY UA: 1.02 (ref 1–1.03)
TOTAL PROTEIN: 6.7 G/DL (ref 6.4–8.3)
UROBILINOGEN, URINE: 0.2 E.U./DL
WBC # BLD: 6.4 E9/L (ref 4.5–11.5)
WBC UA: >20 /HPF (ref 0–5)

## 2020-09-16 PROCEDURE — 2580000003 HC RX 258: Performed by: EMERGENCY MEDICINE

## 2020-09-16 PROCEDURE — 6360000002 HC RX W HCPCS: Performed by: EMERGENCY MEDICINE

## 2020-09-16 PROCEDURE — 96374 THER/PROPH/DIAG INJ IV PUSH: CPT

## 2020-09-16 PROCEDURE — 80053 COMPREHEN METABOLIC PANEL: CPT

## 2020-09-16 PROCEDURE — 99283 EMERGENCY DEPT VISIT LOW MDM: CPT

## 2020-09-16 PROCEDURE — 74220 X-RAY XM ESOPHAGUS 1CNTRST: CPT

## 2020-09-16 PROCEDURE — 99282 EMERGENCY DEPT VISIT SF MDM: CPT

## 2020-09-16 PROCEDURE — 85025 COMPLETE CBC W/AUTO DIFF WBC: CPT

## 2020-09-16 PROCEDURE — 83735 ASSAY OF MAGNESIUM: CPT

## 2020-09-16 PROCEDURE — 81001 URINALYSIS AUTO W/SCOPE: CPT

## 2020-09-16 RX ORDER — 0.9 % SODIUM CHLORIDE 0.9 %
1000 INTRAVENOUS SOLUTION INTRAVENOUS ONCE
Status: COMPLETED | OUTPATIENT
Start: 2020-09-16 | End: 2020-09-16

## 2020-09-16 RX ORDER — CEFDINIR 300 MG/1
300 CAPSULE ORAL 2 TIMES DAILY
Qty: 14 CAPSULE | Refills: 0 | Status: ON HOLD | OUTPATIENT
Start: 2020-09-16 | End: 2020-09-22 | Stop reason: HOSPADM

## 2020-09-16 RX ORDER — ONDANSETRON 4 MG/1
4 TABLET, ORALLY DISINTEGRATING ORAL EVERY 8 HOURS PRN
Qty: 20 TABLET | Refills: 0 | Status: ON HOLD | OUTPATIENT
Start: 2020-09-16 | End: 2020-09-22 | Stop reason: HOSPADM

## 2020-09-16 RX ADMIN — SODIUM CHLORIDE 1000 ML: 9 INJECTION, SOLUTION INTRAVENOUS at 13:19

## 2020-09-16 RX ADMIN — CEFTRIAXONE 1 G: 1 INJECTION, POWDER, FOR SOLUTION INTRAMUSCULAR; INTRAVENOUS at 13:41

## 2020-09-16 ASSESSMENT — ENCOUNTER SYMPTOMS
RHINORRHEA: 0
VOMITING: 1
CHEST TIGHTNESS: 0
EYE PAIN: 0
COLOR CHANGE: 0
FACIAL SWELLING: 0
PHOTOPHOBIA: 0
NAUSEA: 1
EYE REDNESS: 0
COUGH: 0
DIARRHEA: 0
SHORTNESS OF BREATH: 0
CONSTIPATION: 0
ABDOMINAL DISTENTION: 0

## 2020-09-16 NOTE — ED PROVIDER NOTES
Patient is 80-year-old female presented emergency department due to nausea and vomiting for the last 4 days. Patient states she had a gastric sleeve surgery in [place] 8 weeks ago. She is worried is complication of the surgery. She called [place] clinic and they told her to go to the emergency department to have them do a esophagram to assess for stricture. Patient complains of nausea with vomiting denies any abdominal pain at this time. Has no other complaints besides the nausea and vomiting that have been constant last 4 days. Patient states nothing is made it better, has been made worse by trying to eat food or smelling food. Patient has tried nothing for this condition. The history is provided by the patient. No  was used. Review of Systems   Constitutional: Negative for activity change, chills, diaphoresis and fatigue. HENT: Negative for congestion, facial swelling, hearing loss and rhinorrhea. Eyes: Negative for photophobia, pain and redness. Respiratory: Negative for cough, chest tightness and shortness of breath. Cardiovascular: Negative for chest pain, palpitations and leg swelling. Gastrointestinal: Positive for nausea and vomiting. Negative for abdominal distention, constipation and diarrhea. Genitourinary: Negative for difficulty urinating, dysuria, frequency and hematuria. Musculoskeletal: Negative for arthralgias, joint swelling and myalgias. Skin: Negative for color change, pallor and rash. Neurological: Negative for light-headedness, numbness and headaches. Hematological: Negative for adenopathy. Physical Exam  Vitals signs and nursing note reviewed. Constitutional:       General: She is not in acute distress. Appearance: She is well-developed. She is obese. She is not ill-appearing. HENT:      Head: Normocephalic and atraumatic.       Right Ear: Tympanic membrane normal.      Left Ear: Tympanic membrane normal.      Nose: Nose normal. No congestion. Mouth/Throat:      Mouth: Mucous membranes are moist.      Pharynx: Oropharynx is clear. Eyes:      Pupils: Pupils are equal, round, and reactive to light. Neck:      Musculoskeletal: Normal range of motion and neck supple. Cardiovascular:      Rate and Rhythm: Normal rate and regular rhythm. Pulmonary:      Effort: Pulmonary effort is normal. No respiratory distress. Breath sounds: Normal breath sounds. No wheezing or rales. Abdominal:      General: Bowel sounds are normal.      Palpations: Abdomen is soft. There is no mass. Tenderness: There is no abdominal tenderness. There is no guarding or rebound. Musculoskeletal:      Right lower leg: No edema. Left lower leg: No edema. Skin:     General: Skin is warm and dry. Neurological:      Mental Status: She is alert and oriented to person, place, and time. Cranial Nerves: No cranial nerve deficit. Coordination: Coordination normal.          Procedures           MDM  Number of Diagnoses or Management Options  Acute cystitis without hematuria:   Nausea and vomiting, intractability of vomiting not specified, unspecified vomiting type:   Diagnosis management comments: Patient is found to have urinary tract infection. She was treated for the suprapubic infection in the emerge department with antibiotics and given Zofran for her nausea. This improved her nausea symptoms. Esophagram was also completed due to the suspicion that she may have a stricture from previous surgery. This was found to not be the case. She was reassured that there was no stricture on imaging evaluation and was told to follow-up with [facility] who did her original procedure for reevaluation. Patient then discharged with antibiotics for her urinary tract infection as well as Zofran for her ongoing nausea possibly from the urinary tract infection.        ED Course as of Sep 16 1625   Wed Sep 16, 2020   1300 Patient found Result Value Ref Range    WBC 6.4 4.5 - 11.5 E9/L    RBC 5.26 3.50 - 5.50 E12/L    Hemoglobin 15.2 11.5 - 15.5 g/dL    Hematocrit 48.5 (H) 34.0 - 48.0 %    MCV 92.2 80.0 - 99.9 fL    MCH 28.9 26.0 - 35.0 pg    MCHC 31.3 (L) 32.0 - 34.5 %    RDW 14.6 11.5 - 15.0 fL    Platelets 097 184 - 618 E9/L    MPV 11.1 7.0 - 12.0 fL    Neutrophils % 58.3 43.0 - 80.0 %    Immature Granulocytes % 0.3 0.0 - 5.0 %    Lymphocytes % 29.0 20.0 - 42.0 %    Monocytes % 7.2 2.0 - 12.0 %    Eosinophils % 4.4 0.0 - 6.0 %    Basophils % 0.8 0.0 - 2.0 %    Neutrophils Absolute 3.75 1.80 - 7.30 E9/L    Immature Granulocytes # 0.02 E9/L    Lymphocytes Absolute 1.86 1.50 - 4.00 E9/L    Monocytes Absolute 0.46 0.10 - 0.95 E9/L    Eosinophils Absolute 0.28 0.05 - 0.50 E9/L    Basophils Absolute 0.05 0.00 - 0.20 E9/L   Urinalysis, reflex to microscopic   Result Value Ref Range    Color, UA Yellow Straw/Yellow    Clarity, UA Clear Clear    Glucose, Ur Negative Negative mg/dL    Bilirubin Urine Negative Negative    Ketones, Urine Negative Negative mg/dL    Specific Gravity, UA 1.020 1.005 - 1.030    Blood, Urine SMALL (A) Negative    pH, UA 6.0 5.0 - 9.0    Protein, UA TRACE Negative mg/dL    Urobilinogen, Urine 0.2 <2.0 E.U./dL    Nitrite, Urine POSITIVE (A) Negative    Leukocyte Esterase, Urine LARGE (A) Negative   Microscopic Urinalysis   Result Value Ref Range    WBC, UA >20 (A) 0 - 5 /HPF    RBC, UA 1-3 0 - 2 /HPF    Bacteria, UA MANY (A) None Seen /HPF   SPECIMEN REJECTION   Result Value Ref Range    Rejected Test chem     Reason for Rejection hem    Comprehensive metabolic panel   Result Value Ref Range    Sodium 141 132 - 146 mmol/L    Potassium 4.1 3.5 - 5.0 mmol/L    Chloride 105 98 - 107 mmol/L    CO2 29 22 - 29 mmol/L    Anion Gap 7 7 - 16 mmol/L    Glucose 102 (H) 74 - 99 mg/dL    BUN 13 8 - 23 mg/dL    CREATININE 1.1 (H) 0.5 - 1.0 mg/dL    GFR Non-African American 49 >=60 mL/min/1.73    GFR African American 59     Calcium 9.4 8.6 - 10.2 mg/dL    Total Protein 6.7 6.4 - 8.3 g/dL    Alb 3.4 (L) 3.5 - 5.2 g/dL    Total Bilirubin 0.4 0.0 - 1.2 mg/dL    Alkaline Phosphatase 71 35 - 104 U/L    ALT 16 0 - 32 U/L    AST 15 0 - 31 U/L   Magnesium   Result Value Ref Range    Magnesium 1.7 1.6 - 2.6 mg/dL       Radiology:  FL ESOPHAGRAM    (Results Pending)       ------------------------- NURSING NOTES AND VITALS REVIEWED ---------------------------  Date / Time Roomed:  9/16/2020 10:48 AM  ED Bed Assignment:  13/13    The nursing notes within the ED encounter and vital signs as below have been reviewed. /72   Pulse 74   Temp 97.7 °F (36.5 °C) (Oral)   Resp 14   Ht 5' 5\" (1.651 m)   Wt (!) 409 lb (185.5 kg)   SpO2 96%   BMI 68.06 kg/m²   Oxygen Saturation Interpretation: Normal      ------------------------------------------ PROGRESS NOTES ------------------------------------------  6:47 AM EDT  I have spoken with the patient and discussed todays results, in addition to providing specific details for the plan of care and counseling regarding the diagnosis and prognosis. Their questions are answered at this time and they are agreeable with the plan. I discussed at length with them reasons for immediate return here for re evaluation. They will followup with PCP by calling their office tomorrow      --------------------------------- ADDITIONAL PROVIDER NOTES ---------------------------------  At this time the patient is without objective evidence of an acute process requiring hospitalization or inpatient management. They have remained hemodynamically stable throughout their entire ED visit and are stable for discharge with outpatient follow-up. The plan has been discussed in detail and they are aware of the specific conditions for emergent return, as well as the importance of follow-up.       Discharge Medication List as of 9/16/2020  4:27 PM      START taking these medications    Details   cefdinir (OMNICEF) 300 MG capsule Take 1

## 2020-09-17 ENCOUNTER — HOSPITAL ENCOUNTER (INPATIENT)
Age: 70
LOS: 4 days | Discharge: NURSING, REHAB OR LONG-TERM CARE | DRG: 682 | End: 2020-09-22
Attending: EMERGENCY MEDICINE | Admitting: INTERNAL MEDICINE
Payer: COMMERCIAL

## 2020-09-17 ENCOUNTER — APPOINTMENT (OUTPATIENT)
Dept: GENERAL RADIOLOGY | Age: 70
DRG: 682 | End: 2020-09-17
Payer: COMMERCIAL

## 2020-09-17 ENCOUNTER — APPOINTMENT (OUTPATIENT)
Dept: CT IMAGING | Age: 70
DRG: 682 | End: 2020-09-17
Payer: COMMERCIAL

## 2020-09-17 LAB
ALBUMIN SERPL-MCNC: 3.4 G/DL (ref 3.5–5.2)
ALP BLD-CCNC: 70 U/L (ref 35–104)
ALT SERPL-CCNC: 18 U/L (ref 0–32)
ANION GAP SERPL CALCULATED.3IONS-SCNC: 10 MMOL/L (ref 7–16)
AST SERPL-CCNC: 16 U/L (ref 0–31)
BASOPHILS ABSOLUTE: 0.04 E9/L (ref 0–0.2)
BASOPHILS RELATIVE PERCENT: 0.5 % (ref 0–2)
BILIRUB SERPL-MCNC: 0.2 MG/DL (ref 0–1.2)
BUN BLDV-MCNC: 20 MG/DL (ref 8–23)
CALCIUM SERPL-MCNC: 9.3 MG/DL (ref 8.6–10.2)
CHLORIDE BLD-SCNC: 105 MMOL/L (ref 98–107)
CO2: 25 MMOL/L (ref 22–29)
CREAT SERPL-MCNC: 2.4 MG/DL (ref 0.5–1)
EOSINOPHILS ABSOLUTE: 0.2 E9/L (ref 0.05–0.5)
EOSINOPHILS RELATIVE PERCENT: 2.3 % (ref 0–6)
GFR AFRICAN AMERICAN: 24
GFR NON-AFRICAN AMERICAN: 20 ML/MIN/1.73
GLUCOSE BLD-MCNC: 126 MG/DL (ref 74–99)
HCT VFR BLD CALC: 45.3 % (ref 34–48)
HEMOGLOBIN: 13.8 G/DL (ref 11.5–15.5)
IMMATURE GRANULOCYTES #: 0.05 E9/L
IMMATURE GRANULOCYTES %: 0.6 % (ref 0–5)
LACTIC ACID: 1.4 MMOL/L (ref 0.5–2.2)
LYMPHOCYTES ABSOLUTE: 1.52 E9/L (ref 1.5–4)
LYMPHOCYTES RELATIVE PERCENT: 17.6 % (ref 20–42)
MCH RBC QN AUTO: 29 PG (ref 26–35)
MCHC RBC AUTO-ENTMCNC: 30.5 % (ref 32–34.5)
MCV RBC AUTO: 95.2 FL (ref 80–99.9)
MONOCYTES ABSOLUTE: 0.72 E9/L (ref 0.1–0.95)
MONOCYTES RELATIVE PERCENT: 8.3 % (ref 2–12)
NEUTROPHILS ABSOLUTE: 6.13 E9/L (ref 1.8–7.3)
NEUTROPHILS RELATIVE PERCENT: 70.7 % (ref 43–80)
PDW BLD-RTO: 14.8 FL (ref 11.5–15)
PLATELET # BLD: 224 E9/L (ref 130–450)
PMV BLD AUTO: 10.4 FL (ref 7–12)
POTASSIUM SERPL-SCNC: 4.3 MMOL/L (ref 3.5–5)
PRO-BNP: 953 PG/ML (ref 0–125)
RBC # BLD: 4.76 E12/L (ref 3.5–5.5)
SODIUM BLD-SCNC: 140 MMOL/L (ref 132–146)
TOTAL PROTEIN: 6.6 G/DL (ref 6.4–8.3)
TROPONIN: <0.01 NG/ML (ref 0–0.03)
WBC # BLD: 8.7 E9/L (ref 4.5–11.5)

## 2020-09-17 PROCEDURE — 70450 CT HEAD/BRAIN W/O DYE: CPT

## 2020-09-17 PROCEDURE — 85025 COMPLETE CBC W/AUTO DIFF WBC: CPT

## 2020-09-17 PROCEDURE — 71045 X-RAY EXAM CHEST 1 VIEW: CPT

## 2020-09-17 PROCEDURE — 83880 ASSAY OF NATRIURETIC PEPTIDE: CPT

## 2020-09-17 PROCEDURE — 2580000003 HC RX 258: Performed by: PHYSICIAN ASSISTANT

## 2020-09-17 PROCEDURE — 96360 HYDRATION IV INFUSION INIT: CPT

## 2020-09-17 PROCEDURE — 72125 CT NECK SPINE W/O DYE: CPT

## 2020-09-17 PROCEDURE — 80053 COMPREHEN METABOLIC PANEL: CPT

## 2020-09-17 PROCEDURE — 99284 EMERGENCY DEPT VISIT MOD MDM: CPT

## 2020-09-17 PROCEDURE — 83605 ASSAY OF LACTIC ACID: CPT

## 2020-09-17 PROCEDURE — 93005 ELECTROCARDIOGRAM TRACING: CPT | Performed by: PHYSICIAN ASSISTANT

## 2020-09-17 PROCEDURE — 84484 ASSAY OF TROPONIN QUANT: CPT

## 2020-09-17 RX ORDER — 0.9 % SODIUM CHLORIDE 0.9 %
1000 INTRAVENOUS SOLUTION INTRAVENOUS ONCE
Status: COMPLETED | OUTPATIENT
Start: 2020-09-17 | End: 2020-09-18

## 2020-09-17 RX ADMIN — SODIUM CHLORIDE 1000 ML: 9 INJECTION, SOLUTION INTRAVENOUS at 23:08

## 2020-09-17 ASSESSMENT — PAIN DESCRIPTION - FREQUENCY: FREQUENCY: CONTINUOUS

## 2020-09-17 ASSESSMENT — PAIN DESCRIPTION - ONSET: ONSET: ON-GOING

## 2020-09-17 ASSESSMENT — PAIN DESCRIPTION - PAIN TYPE: TYPE: ACUTE PAIN

## 2020-09-17 ASSESSMENT — PAIN DESCRIPTION - DESCRIPTORS: DESCRIPTORS: TENDER;SHARP

## 2020-09-17 ASSESSMENT — PAIN SCALES - GENERAL: PAINLEVEL_OUTOF10: 2

## 2020-09-17 ASSESSMENT — PAIN DESCRIPTION - ORIENTATION: ORIENTATION: LOWER

## 2020-09-17 ASSESSMENT — PAIN DESCRIPTION - PROGRESSION: CLINICAL_PROGRESSION: GRADUALLY WORSENING

## 2020-09-17 ASSESSMENT — PAIN DESCRIPTION - LOCATION: LOCATION: BACK

## 2020-09-18 PROBLEM — N30.01 ACUTE CYSTITIS WITH HEMATURIA: Status: ACTIVE | Noted: 2020-09-18

## 2020-09-18 PROBLEM — N17.9 ACUTE RENAL FAILURE (ARF) (HCC): Status: ACTIVE | Noted: 2020-09-18

## 2020-09-18 PROBLEM — R55 PRE-SYNCOPE: Status: ACTIVE | Noted: 2020-09-18

## 2020-09-18 PROBLEM — I95.89 HYPOTENSION DUE TO HYPOVOLEMIA: Status: ACTIVE | Noted: 2020-09-18

## 2020-09-18 PROBLEM — E86.1 HYPOTENSION DUE TO HYPOVOLEMIA: Status: ACTIVE | Noted: 2020-09-18

## 2020-09-18 PROBLEM — I48.0 PAF (PAROXYSMAL ATRIAL FIBRILLATION) (HCC): Status: ACTIVE | Noted: 2018-12-10

## 2020-09-18 PROBLEM — R11.2 INTRACTABLE VOMITING WITH NAUSEA: Status: ACTIVE | Noted: 2020-09-18

## 2020-09-18 LAB
ALBUMIN SERPL-MCNC: 3 G/DL (ref 3.5–5.2)
ALP BLD-CCNC: 62 U/L (ref 35–104)
ALT SERPL-CCNC: 15 U/L (ref 0–32)
ANION GAP SERPL CALCULATED.3IONS-SCNC: 8 MMOL/L (ref 7–16)
AST SERPL-CCNC: 15 U/L (ref 0–31)
BACTERIA: ABNORMAL /HPF
BASOPHILS ABSOLUTE: 0.02 E9/L (ref 0–0.2)
BASOPHILS RELATIVE PERCENT: 0.2 % (ref 0–2)
BILIRUB SERPL-MCNC: <0.2 MG/DL (ref 0–1.2)
BILIRUBIN URINE: ABNORMAL
BLOOD, URINE: ABNORMAL
BUN BLDV-MCNC: 19 MG/DL (ref 8–23)
CALCIUM SERPL-MCNC: 8.3 MG/DL (ref 8.6–10.2)
CHLORIDE BLD-SCNC: 106 MMOL/L (ref 98–107)
CLARITY: ABNORMAL
CO2: 23 MMOL/L (ref 22–29)
COLOR: YELLOW
CREAT SERPL-MCNC: 2.3 MG/DL (ref 0.5–1)
CREATININE URINE: 328 MG/DL (ref 29–226)
D DIMER: <200 NG/ML DDU
EKG ATRIAL RATE: 102 BPM
EKG Q-T INTERVAL: 344 MS
EKG QRS DURATION: 86 MS
EKG QTC CALCULATION (BAZETT): 413 MS
EKG R AXIS: 44 DEGREES
EKG T AXIS: 56 DEGREES
EKG VENTRICULAR RATE: 87 BPM
EOSINOPHILS ABSOLUTE: 0.31 E9/L (ref 0.05–0.5)
EOSINOPHILS RELATIVE PERCENT: 3.9 % (ref 0–6)
GFR AFRICAN AMERICAN: 25
GFR NON-AFRICAN AMERICAN: 21 ML/MIN/1.73
GLUCOSE BLD-MCNC: 91 MG/DL (ref 74–99)
GLUCOSE URINE: NEGATIVE MG/DL
HBA1C MFR BLD: 5.6 % (ref 4–5.6)
HCT VFR BLD CALC: 42.3 % (ref 34–48)
HEMOGLOBIN: 12.4 G/DL (ref 11.5–15.5)
IMMATURE GRANULOCYTES #: 0.04 E9/L
IMMATURE GRANULOCYTES %: 0.5 % (ref 0–5)
KETONES, URINE: 15 MG/DL
LEUKOCYTE ESTERASE, URINE: ABNORMAL
LYMPHOCYTES ABSOLUTE: 2.24 E9/L (ref 1.5–4)
LYMPHOCYTES RELATIVE PERCENT: 28 % (ref 20–42)
MCH RBC QN AUTO: 28.8 PG (ref 26–35)
MCHC RBC AUTO-ENTMCNC: 29.3 % (ref 32–34.5)
MCV RBC AUTO: 98.4 FL (ref 80–99.9)
METER GLUCOSE: 74 MG/DL (ref 74–99)
METER GLUCOSE: 84 MG/DL (ref 74–99)
MONOCYTES ABSOLUTE: 0.62 E9/L (ref 0.1–0.95)
MONOCYTES RELATIVE PERCENT: 7.7 % (ref 2–12)
NEUTROPHILS ABSOLUTE: 4.78 E9/L (ref 1.8–7.3)
NEUTROPHILS RELATIVE PERCENT: 59.7 % (ref 43–80)
NITRITE, URINE: NEGATIVE
PDW BLD-RTO: 15 FL (ref 11.5–15)
PH UA: 5 (ref 5–9)
PLATELET # BLD: 200 E9/L (ref 130–450)
PMV BLD AUTO: 10.5 FL (ref 7–12)
POTASSIUM SERPL-SCNC: 3.9 MMOL/L (ref 3.5–5)
PROTEIN UA: 100 MG/DL
RBC # BLD: 4.3 E12/L (ref 3.5–5.5)
RBC UA: ABNORMAL /HPF (ref 0–2)
SODIUM BLD-SCNC: 137 MMOL/L (ref 132–146)
SODIUM URINE: 63 MMOL/L
SPECIFIC GRAVITY UA: >=1.03 (ref 1–1.03)
TOTAL PROTEIN: 5.9 G/DL (ref 6.4–8.3)
TROPONIN: <0.01 NG/ML (ref 0–0.03)
UROBILINOGEN, URINE: 0.2 E.U./DL
WBC # BLD: 8 E9/L (ref 4.5–11.5)
WBC UA: >20 /HPF (ref 0–5)

## 2020-09-18 PROCEDURE — 6360000002 HC RX W HCPCS: Performed by: INTERNAL MEDICINE

## 2020-09-18 PROCEDURE — 2060000000 HC ICU INTERMEDIATE R&B

## 2020-09-18 PROCEDURE — 81001 URINALYSIS AUTO W/SCOPE: CPT

## 2020-09-18 PROCEDURE — 82962 GLUCOSE BLOOD TEST: CPT

## 2020-09-18 PROCEDURE — 2700000000 HC OXYGEN THERAPY PER DAY

## 2020-09-18 PROCEDURE — 2580000003 HC RX 258: Performed by: INTERNAL MEDICINE

## 2020-09-18 PROCEDURE — 87040 BLOOD CULTURE FOR BACTERIA: CPT

## 2020-09-18 PROCEDURE — 99223 1ST HOSP IP/OBS HIGH 75: CPT | Performed by: INTERNAL MEDICINE

## 2020-09-18 PROCEDURE — 80053 COMPREHEN METABOLIC PANEL: CPT

## 2020-09-18 PROCEDURE — 6370000000 HC RX 637 (ALT 250 FOR IP): Performed by: INTERNAL MEDICINE

## 2020-09-18 PROCEDURE — 84300 ASSAY OF URINE SODIUM: CPT

## 2020-09-18 PROCEDURE — 85378 FIBRIN DEGRADE SEMIQUANT: CPT

## 2020-09-18 PROCEDURE — 99222 1ST HOSP IP/OBS MODERATE 55: CPT | Performed by: SURGERY

## 2020-09-18 PROCEDURE — 36415 COLL VENOUS BLD VENIPUNCTURE: CPT

## 2020-09-18 PROCEDURE — 82570 ASSAY OF URINE CREATININE: CPT

## 2020-09-18 PROCEDURE — 85025 COMPLETE CBC W/AUTO DIFF WBC: CPT

## 2020-09-18 PROCEDURE — 2580000003 HC RX 258: Performed by: PHYSICIAN ASSISTANT

## 2020-09-18 PROCEDURE — 96360 HYDRATION IV INFUSION INIT: CPT

## 2020-09-18 PROCEDURE — 94640 AIRWAY INHALATION TREATMENT: CPT

## 2020-09-18 PROCEDURE — U0003 INFECTIOUS AGENT DETECTION BY NUCLEIC ACID (DNA OR RNA); SEVERE ACUTE RESPIRATORY SYNDROME CORONAVIRUS 2 (SARS-COV-2) (CORONAVIRUS DISEASE [COVID-19]), AMPLIFIED PROBE TECHNIQUE, MAKING USE OF HIGH THROUGHPUT TECHNOLOGIES AS DESCRIBED BY CMS-2020-01-R: HCPCS

## 2020-09-18 PROCEDURE — 84484 ASSAY OF TROPONIN QUANT: CPT

## 2020-09-18 PROCEDURE — 87088 URINE BACTERIA CULTURE: CPT

## 2020-09-18 PROCEDURE — 94664 DEMO&/EVAL PT USE INHALER: CPT

## 2020-09-18 PROCEDURE — 83036 HEMOGLOBIN GLYCOSYLATED A1C: CPT

## 2020-09-18 PROCEDURE — 6360000002 HC RX W HCPCS: Performed by: PHYSICIAN ASSISTANT

## 2020-09-18 RX ORDER — BUDESONIDE AND FORMOTEROL FUMARATE DIHYDRATE 160; 4.5 UG/1; UG/1
2 AEROSOL RESPIRATORY (INHALATION) 2 TIMES DAILY
Status: DISCONTINUED | OUTPATIENT
Start: 2020-09-18 | End: 2020-09-18 | Stop reason: CLARIF

## 2020-09-18 RX ORDER — ASPIRIN 81 MG/1
81 TABLET ORAL DAILY
Status: DISCONTINUED | OUTPATIENT
Start: 2020-09-18 | End: 2020-09-22 | Stop reason: HOSPADM

## 2020-09-18 RX ORDER — BUDESONIDE 0.5 MG/2ML
0.5 INHALANT ORAL 2 TIMES DAILY
Status: DISCONTINUED | OUTPATIENT
Start: 2020-09-18 | End: 2020-09-22 | Stop reason: HOSPADM

## 2020-09-18 RX ORDER — ONDANSETRON 2 MG/ML
4 INJECTION INTRAMUSCULAR; INTRAVENOUS EVERY 6 HOURS PRN
Status: DISCONTINUED | OUTPATIENT
Start: 2020-09-18 | End: 2020-09-22 | Stop reason: HOSPADM

## 2020-09-18 RX ORDER — PANTOPRAZOLE SODIUM 40 MG/1
40 TABLET, DELAYED RELEASE ORAL DAILY
Status: DISCONTINUED | OUTPATIENT
Start: 2020-09-18 | End: 2020-09-22 | Stop reason: HOSPADM

## 2020-09-18 RX ORDER — 0.9 % SODIUM CHLORIDE 0.9 %
1000 INTRAVENOUS SOLUTION INTRAVENOUS ONCE
Status: COMPLETED | OUTPATIENT
Start: 2020-09-18 | End: 2020-09-18

## 2020-09-18 RX ORDER — POLYETHYLENE GLYCOL 3350 17 G/17G
17 POWDER, FOR SOLUTION ORAL DAILY PRN
Status: DISCONTINUED | OUTPATIENT
Start: 2020-09-18 | End: 2020-09-22 | Stop reason: HOSPADM

## 2020-09-18 RX ORDER — SODIUM CHLORIDE 0.9 % (FLUSH) 0.9 %
10 SYRINGE (ML) INJECTION PRN
Status: DISCONTINUED | OUTPATIENT
Start: 2020-09-18 | End: 2020-09-22 | Stop reason: HOSPADM

## 2020-09-18 RX ORDER — 0.9 % SODIUM CHLORIDE 0.9 %
500 INTRAVENOUS SOLUTION INTRAVENOUS ONCE
Status: COMPLETED | OUTPATIENT
Start: 2020-09-18 | End: 2020-09-18

## 2020-09-18 RX ORDER — HYDROCODONE BITARTRATE AND ACETAMINOPHEN 10; 325 MG/1; MG/1
1 TABLET ORAL 2 TIMES DAILY
Status: ON HOLD | COMMUNITY
End: 2020-09-22 | Stop reason: HOSPADM

## 2020-09-18 RX ORDER — ACETAMINOPHEN 325 MG/1
650 TABLET ORAL EVERY 6 HOURS PRN
Status: DISCONTINUED | OUTPATIENT
Start: 2020-09-18 | End: 2020-09-22 | Stop reason: HOSPADM

## 2020-09-18 RX ORDER — ATORVASTATIN CALCIUM 40 MG/1
40 TABLET, FILM COATED ORAL DAILY
Status: DISCONTINUED | OUTPATIENT
Start: 2020-09-18 | End: 2020-09-22 | Stop reason: HOSPADM

## 2020-09-18 RX ORDER — ARFORMOTEROL TARTRATE 15 UG/2ML
15 SOLUTION RESPIRATORY (INHALATION) 2 TIMES DAILY
Status: DISCONTINUED | OUTPATIENT
Start: 2020-09-18 | End: 2020-09-22 | Stop reason: HOSPADM

## 2020-09-18 RX ORDER — CETIRIZINE HYDROCHLORIDE 10 MG/1
10 TABLET ORAL DAILY
Status: DISCONTINUED | OUTPATIENT
Start: 2020-09-18 | End: 2020-09-22 | Stop reason: HOSPADM

## 2020-09-18 RX ORDER — SODIUM CHLORIDE 0.9 % (FLUSH) 0.9 %
10 SYRINGE (ML) INJECTION EVERY 12 HOURS SCHEDULED
Status: DISCONTINUED | OUTPATIENT
Start: 2020-09-18 | End: 2020-09-22 | Stop reason: HOSPADM

## 2020-09-18 RX ORDER — AMLODIPINE BESYLATE 5 MG/1
5 TABLET ORAL 2 TIMES DAILY
Status: DISCONTINUED | OUTPATIENT
Start: 2020-09-18 | End: 2020-09-18

## 2020-09-18 RX ORDER — ALBUTEROL SULFATE 2.5 MG/3ML
2.5 SOLUTION RESPIRATORY (INHALATION) DAILY PRN
Status: DISCONTINUED | OUTPATIENT
Start: 2020-09-18 | End: 2020-09-22 | Stop reason: HOSPADM

## 2020-09-18 RX ORDER — ACETAMINOPHEN 650 MG/1
650 SUPPOSITORY RECTAL EVERY 6 HOURS PRN
Status: DISCONTINUED | OUTPATIENT
Start: 2020-09-18 | End: 2020-09-22 | Stop reason: HOSPADM

## 2020-09-18 RX ORDER — DOCUSATE SODIUM 100 MG/1
100 CAPSULE, LIQUID FILLED ORAL 2 TIMES DAILY
Status: DISCONTINUED | OUTPATIENT
Start: 2020-09-18 | End: 2020-09-22 | Stop reason: HOSPADM

## 2020-09-18 RX ORDER — SODIUM CHLORIDE 9 MG/ML
INJECTION, SOLUTION INTRAVENOUS CONTINUOUS
Status: ACTIVE | OUTPATIENT
Start: 2020-09-18 | End: 2020-09-18

## 2020-09-18 RX ORDER — PROMETHAZINE HYDROCHLORIDE 25 MG/1
12.5 TABLET ORAL EVERY 6 HOURS PRN
Status: DISCONTINUED | OUTPATIENT
Start: 2020-09-18 | End: 2020-09-22 | Stop reason: HOSPADM

## 2020-09-18 RX ADMIN — CETIRIZINE HYDROCHLORIDE 10 MG: 10 TABLET, FILM COATED ORAL at 09:44

## 2020-09-18 RX ADMIN — SODIUM CHLORIDE 1000 ML: 9 INJECTION, SOLUTION INTRAVENOUS at 01:32

## 2020-09-18 RX ADMIN — APIXABAN 5 MG: 5 TABLET, FILM COATED ORAL at 09:44

## 2020-09-18 RX ADMIN — SODIUM CHLORIDE: 9 INJECTION, SOLUTION INTRAVENOUS at 04:28

## 2020-09-18 RX ADMIN — ARFORMOTEROL TARTRATE 15 MCG: 15 SOLUTION RESPIRATORY (INHALATION) at 22:17

## 2020-09-18 RX ADMIN — APIXABAN 5 MG: 5 TABLET, FILM COATED ORAL at 20:45

## 2020-09-18 RX ADMIN — ACETAMINOPHEN 650 MG: 325 TABLET ORAL at 20:45

## 2020-09-18 RX ADMIN — CEFTRIAXONE 1 G: 1 INJECTION, POWDER, FOR SOLUTION INTRAMUSCULAR; INTRAVENOUS at 01:31

## 2020-09-18 RX ADMIN — SODIUM CHLORIDE, PRESERVATIVE FREE 10 ML: 5 INJECTION INTRAVENOUS at 20:45

## 2020-09-18 RX ADMIN — ARFORMOTEROL TARTRATE 15 MCG: 15 SOLUTION RESPIRATORY (INHALATION) at 09:03

## 2020-09-18 RX ADMIN — DOCUSATE SODIUM 100 MG: 100 CAPSULE, LIQUID FILLED ORAL at 09:44

## 2020-09-18 RX ADMIN — SODIUM CHLORIDE 1000 ML: 9 INJECTION, SOLUTION INTRAVENOUS at 01:00

## 2020-09-18 RX ADMIN — SODIUM CHLORIDE 500 ML: 9 INJECTION, SOLUTION INTRAVENOUS at 02:42

## 2020-09-18 RX ADMIN — ATORVASTATIN CALCIUM 40 MG: 40 TABLET, FILM COATED ORAL at 09:44

## 2020-09-18 RX ADMIN — ASPIRIN 81 MG: 81 TABLET, COATED ORAL at 09:44

## 2020-09-18 RX ADMIN — PANTOPRAZOLE SODIUM 40 MG: 40 TABLET, DELAYED RELEASE ORAL at 09:44

## 2020-09-18 RX ADMIN — SODIUM CHLORIDE 1000 ML: 9 INJECTION, SOLUTION INTRAVENOUS at 08:35

## 2020-09-18 RX ADMIN — BUDESONIDE 500 MCG: 0.5 SUSPENSION RESPIRATORY (INHALATION) at 22:17

## 2020-09-18 RX ADMIN — BUDESONIDE 500 MCG: 0.5 SUSPENSION RESPIRATORY (INHALATION) at 09:04

## 2020-09-18 ASSESSMENT — PAIN SCALES - GENERAL
PAINLEVEL_OUTOF10: 0
PAINLEVEL_OUTOF10: 7
PAINLEVEL_OUTOF10: 3
PAINLEVEL_OUTOF10: 0

## 2020-09-18 ASSESSMENT — PAIN DESCRIPTION - DESCRIPTORS
DESCRIPTORS: SHARP;TENDER
DESCRIPTORS: ACHING;DISCOMFORT

## 2020-09-18 ASSESSMENT — PAIN DESCRIPTION - LOCATION
LOCATION: SACRUM
LOCATION: SACRUM

## 2020-09-18 ASSESSMENT — PAIN DESCRIPTION - FREQUENCY
FREQUENCY: CONTINUOUS
FREQUENCY: CONTINUOUS

## 2020-09-18 ASSESSMENT — PAIN DESCRIPTION - PROGRESSION
CLINICAL_PROGRESSION: GRADUALLY WORSENING
CLINICAL_PROGRESSION: GRADUALLY WORSENING

## 2020-09-18 ASSESSMENT — PAIN - FUNCTIONAL ASSESSMENT
PAIN_FUNCTIONAL_ASSESSMENT: PREVENTS OR INTERFERES SOME ACTIVE ACTIVITIES AND ADLS
PAIN_FUNCTIONAL_ASSESSMENT: PREVENTS OR INTERFERES SOME ACTIVE ACTIVITIES AND ADLS

## 2020-09-18 ASSESSMENT — PAIN DESCRIPTION - ONSET
ONSET: ON-GOING
ONSET: ON-GOING

## 2020-09-18 ASSESSMENT — PAIN DESCRIPTION - PAIN TYPE
TYPE: ACUTE PAIN
TYPE: CHRONIC PAIN

## 2020-09-18 NOTE — ED NOTES
IV fluids resumed after second US IV placement. Pt is A & O x 4. Pt BP is 79/38 upon resumption of fluids. Will continue to monitor.      Jaimee Wise RN  09/18/20 [id number]

## 2020-09-18 NOTE — PROGRESS NOTES
Occupational Therapy    OT order received and chart reviewed. Pt on hold per nursing due to low BP this date. Will continue to follow. Thank you.     Miguel Sevilla OTR/L  [id number]

## 2020-09-18 NOTE — PROGRESS NOTES
Spoke with Dr. Dannie Haque regarding BP 76/42 and pt complaining she feels very tired and shaky. New orders received.

## 2020-09-18 NOTE — PROGRESS NOTES
· Adjusted BMI:      · BMI Categories: Obese Class 3 (BMI 40.0 or greater)       Nutrition Diagnosis:   · Inadequate oral intake related to altered GI function(2/2 Recent Sleeve Gastrectomy) as evidenced by poor intake prior to admission, GI abnormality, nausea, vomiting      Nutrition Interventions:   Food and/or Nutrient Delivery:  Continue Current Diet, Start Oral Nutrition Supplement(Continue Diet and ADAT. Will Start Glucerna Diabetic ONS BID.)  Nutrition Education/Counseling:  No recommendation at this time   Coordination of Nutrition Care:  Continued Inpatient Monitoring    Goals:  PO intake >50% of meals/ONS. Nutrition Monitoring and Evaluation:   Behavioral-Environmental Outcomes:  (n/a)   Food/Nutrient Intake Outcomes:  Diet Advancement/Tolerance, Food and Nutrient Intake, Supplement Intake  Physical Signs/Symptoms Outcomes:  Biochemical Data, Chewing or Swallowing, GI Status, Nausea or Vomiting, Fluid Status or Edema, Nutrition Focused Physical Findings, Skin, Weight     Discharge Planning:     Too soon to determine     Electronically signed by Liz Colin RD, LD on 9/18/20 at 10:58 AM EDT    Contact: ext [phone]

## 2020-09-18 NOTE — PROGRESS NOTES
Radiology  Ct Head Wo Contrast  Result Date: 9/17/2020  No indication for an acute intracranial process. Ct Cervical Spine Wo Contrast  Result Date: 9/17/2020  No acute fracture or spondylolisthesis is identified on CT of cervical spine. Diffuse degenerative disc and facet disease result in multilevel central and foraminal stenosis. Cervical kyphosis which can be due to muscle spasm or positional. Atherosclerotic vascular disease. Xr Chest Portable  Result Date: 9/17/2020  No evidence for acute cardiopulmonary process.      Assessment / Plan  #1 OLIVIA on CKD IIIa-IIIb   Cr 2.4 in ED w baseline ~1.1-1.7 per EMR   IVF - 3.5 L of IVF boluses and now on NS at 100 cc/hr    Follow intake, output, daily weights to guard against overload    UOP mediocre at 0.4-0.5 cc/kg/hr   Follow Cr with daily labs   Check UA - large leuk esterate / pos nitrite / many bacteria (see #3)   FENa is 0.33%, c/w prerenal azotemia   Not on diuretic at home   Heparin for DVT ppx   Consideration for renal US / nephrology consultation if no improvement in 24h    #2 Acute hypoxic respiratory failure in the setting of COPD w continued tobacco abuse and morbid obesity with SHALA   SpO2 87% on room air in ED; 97% on 3L now   CXR clear   BNP ~1000   Check CT chest (w/o contrast d/t OLIVIA)   Check d-dimer - doubt VTE as pt on Eliquis   O2 as needed    #3 UTI vs bacteriuria   Does not meet septic criteria   Not symptomatic   UA large leuk esterate / pos nitrite / many bacteria   Urine cx sent from ED   Currently on Rocephin IV and will de-escalate based on course / cultures    #4 Weakness / fall   Dizziness, fell, struck head   CT head / cervical spine no acute pathology   Likely r/t #1   Fall precautions   PT OT   ?SNF placement     #5 Atrial fibrillation on chronic anticoagulation   Telemetry monitoring   Continue home metoprolol for rate-control   Continue home Eliquis for anticoagulation     #6 NIDDM / \"pre-diabetes\"   Not on any home oral antihyperglycemics    this AM   Reg diet   Check hemoglobin A1c to assess long-term glucose control   Further orders based on pt course and A1c    #7 HTN - continue home metoprolol    #8 HPL - continue home Lipitor    #9 GERD - continue home Protonix    Code status  Full  DVT prophylaxis Eliquis  Disposition  To be determined    Electronically signed by Ariadna Saenz DO on 9/18/2020 at 3:37 PM

## 2020-09-18 NOTE — ED PROVIDER NOTES
ED Attending  CC: Yes     HPI:  9/17/20, Time: 10:20 PM EDT         Hunter Gastelum is a 79 y.o. female presenting to the ED for Fall, beginning prior to arrival  .  The complaint has been persistent, mild in severity, and worsened by changing position. Patient comes in with complaint of fall. She states she was getting up while in the bathroom felt lightheaded dizzy generalized weakness when she lost her balance and fell hitting her head. She denied any loss of consciousness. Patient is on Eliquis. She has been feeling intermittently confused. Patient denied any neck pain. Patient was seen here in the ER yesterday for persistent vomiting. Patient had a history of gastric sleeve done August 14 at [facility]. Her surgeon had asked that an esophagram be done yesterday when she was in the ER which was normal.  Patient was noted to have a urinary tract infection. Her daughter states that she has not been able to keep the antibiotics down, due to the vomiting. Patient denies any chest pain palpitations diaphoresis. She denies any abdominal pain at this time. No black or tarry stools. No diarrhea or constipation. Patient was noted to be hypoxic with oximeter 87 to 88% on room air. She was hypotensive. Review of Systems:   Pertinent positives and negatives are stated within HPI, all other systems reviewed and are negative.          --------------------------------------------- PAST HISTORY ---------------------------------------------  Past Medical History:  has a past medical history of Arthritis, Cerebral artery occlusion with cerebral infarction ([facility] [address] Cigarette nicotine dependence without complication, COPD (chronic obstructive pulmonary disease) ([facility] [address] Depression, Diabetes mellitus ([facility] [address] GERD (gastroesophageal reflux disease), Hyperlipidemia, Hypertension, Hypothyroidism, Obese, SHALA (obstructive sleep apnea), and Palate mass.     Past Surgical History:  has a past surgical history that includes Hysterectomy; Foot surgery (Right, 2009); Appendectomy; Tonsillectomy; Colonoscopy; Endoscopy, colon, diagnostic; Carpal tunnel release (Bilateral); other surgical history (Left, 11/5/2014); Foot surgery (2014); Hammer toe surgery (Left, 03/06/2017); and joint replacement (bilat knee). Social History:  reports that she has quit smoking. Her smoking use included cigarettes and cigars. She has never used smokeless tobacco. She reports that she does not drink alcohol or use drugs. Family History: family history includes Cancer in her brother, father, sister, and son; Heart Disease in her mother. The patients home medications have been reviewed. Allergies: Food; Iodine; Darvocet [propoxyphene n-acetaminophen]; Influenza vaccines; Lamictal [lamotrigine];  Other; Pentazocine lactate; and Talwin [pentazocine]    -------------------------------------------------- RESULTS -------------------------------------------------  All laboratory and radiology results have been personally reviewed by myself   LABS:  Results for orders placed or performed during the hospital encounter of 09/17/20   CBC Auto Differential   Result Value Ref Range    WBC 8.7 4.5 - 11.5 E9/L    RBC 4.76 3.50 - 5.50 E12/L    Hemoglobin 13.8 11.5 - 15.5 g/dL    Hematocrit 45.3 34.0 - 48.0 %    MCV 95.2 80.0 - 99.9 fL    MCH 29.0 26.0 - 35.0 pg    MCHC 30.5 (L) 32.0 - 34.5 %    RDW 14.8 11.5 - 15.0 fL    Platelets 745 325 - 953 E9/L    MPV 10.4 7.0 - 12.0 fL    Neutrophils % 70.7 43.0 - 80.0 %    Immature Granulocytes % 0.6 0.0 - 5.0 %    Lymphocytes % 17.6 (L) 20.0 - 42.0 %    Monocytes % 8.3 2.0 - 12.0 %    Eosinophils % 2.3 0.0 - 6.0 %    Basophils % 0.5 0.0 - 2.0 %    Neutrophils Absolute 6.13 1.80 - 7.30 E9/L    Immature Granulocytes # 0.05 E9/L    Lymphocytes Absolute 1.52 1.50 - 4.00 E9/L    Monocytes Absolute 0.72 0.10 - 0.95 E9/L    Eosinophils Absolute 0.20 0.05 - 0.50 E9/L    Basophils Absolute 0.04 0.00 - 0.20 E9/L   Comprehensive Metabolic Panel   Result Value Ref Range    Sodium 140 132 - 146 mmol/L    Potassium 4.3 3.5 - 5.0 mmol/L    Chloride 105 98 - 107 mmol/L    CO2 25 22 - 29 mmol/L    Anion Gap 10 7 - 16 mmol/L    Glucose 126 (H) 74 - 99 mg/dL    BUN 20 8 - 23 mg/dL    CREATININE 2.4 (H) 0.5 - 1.0 mg/dL    GFR Non-African American 20 >=60 mL/min/1.73    GFR African American 24     Calcium 9.3 8.6 - 10.2 mg/dL    Total Protein 6.6 6.4 - 8.3 g/dL    Alb 3.4 (L) 3.5 - 5.2 g/dL    Total Bilirubin 0.2 0.0 - 1.2 mg/dL    Alkaline Phosphatase 70 35 - 104 U/L    ALT 18 0 - 32 U/L    AST 16 0 - 31 U/L   Troponin   Result Value Ref Range    Troponin <0.01 0.00 - 0.03 ng/mL   Brain Natriuretic Peptide   Result Value Ref Range    Pro- (H) 0 - 125 pg/mL   Lactic Acid, Plasma   Result Value Ref Range    Lactic Acid 1.4 0.5 - 2.2 mmol/L   EKG 12 Lead   Result Value Ref Range    Ventricular Rate 87 BPM    Atrial Rate 102 BPM    QRS Duration 86 ms    Q-T Interval 344 ms    QTc Calculation (Bazett) 413 ms    R Axis 44 degrees    T Axis 56 degrees       RADIOLOGY:  Interpreted by Radiologist.  CT HEAD WO CONTRAST   Final Result   No indication for an acute intracranial process. CT CERVICAL SPINE WO CONTRAST   Final Result      No acute fracture or spondylolisthesis is identified on CT of cervical   spine. Diffuse degenerative disc and facet disease result in multilevel   central and foraminal stenosis. Cervical kyphosis which can be due to muscle spasm or positional.      Atherosclerotic vascular disease. XR CHEST PORTABLE   Final Result      No evidence for acute cardiopulmonary process. ------------------------- NURSING NOTES AND VITALS REVIEWED ---------------------------   The nursing notes within the ED encounter and vital signs as below have been reviewed.    BP (!) 79/38   Pulse 90   Temp 98 °F (36.7 °C)   Resp 18   Ht 5' 5\" (1.651 m)   Wt (!) 409 lb (185.5 kg)   SpO2 98%   BMI 68.06 kg/m²   Oxygen Saturation Interpretation: Normal      ---------------------------------------------------PHYSICAL EXAM--------------------------------------      Constitutional/General: Alert and oriented x3,   Head: Normocephalic and atraumatic  Eyes: PERRL, EOMI  Mouth: Oropharynx clear, handling secretions, no trismus  Neck: Supple, full ROM,   Pulmonary: Lungs clear to auscultation bilaterally, no wheezes, rales, or rhonchi. Not in respiratory distress  Cardiovascular:  Regular rate and rhythm, no murmurs, gallops, or rubs. 2+ distal pulses  Abdomen: Soft, non tender, obese abdomen no guarding or rebound noted  Rectal guaiac negative stool  Extremities: Moves all extremities x 4. Warm and well perfused  Skin: warm and dry without rash  Neurologic: GCS 15,  Psych: Normal Affect      ------------------------------ ED COURSE/MEDICAL DECISION MAKING----------------------  Medications   cefTRIAXone (ROCEPHIN) 1 g in sterile water 10 mL IV syringe (has no administration in time range)   0.9 % sodium chloride bolus (1,000 mLs Intravenous New Bag 9/17/20 6483)         ED COURSE:     EKG # 1  Interpreted by emergency department physician unless otherwise noted. Time:  8922  Rate:87  Rhythm: Atrial fibrillation. Interpretation: Atrial fibrillation controlled rate. .      Critical care time 30 minutes not including procedures. Medical Decision Making:    Patient came in with complaint of fall. She states she became dizzy when getting up from the toilet. She was found to be hypovolemic with renal insufficiency. Patient was hypotensive with blood pressure 76/38 upon presentation to the emergency room. Patient was hypoxic upon presentation with oximeter of 87 to 88% on room air she was placed on a nasal cannula. She was here yesterday treated for urinary tract infection with persistent vomiting. Patient has no complaint of dysuria or abdominal pain at this time with normal white cell count.   Patient had a history of gastric sleeve done on August 14 with normal esophagram yesterday. Patient was unable to tolerate the oral antibiotics she was given a dose of Rocephin. CT head no intracranial bleed CT cervical no fracture. She will be admitted to telemetry floor    Counseling: The emergency provider has spoken with the patient and discussed todays results, in addition to providing specific details for the plan of care and counseling regarding the diagnosis and prognosis. Questions are answered at this time and they are agreeable with the plan.      --------------------------------- IMPRESSION AND DISPOSITION ---------------------------------    IMPRESSION  1. Closed head injury, initial encounter    2. Cervical strain, acute, initial encounter    3. Hypoxia    4. Acute renal failure, unspecified acute renal failure type ([address]    5. Hypovolemia    6. Acute cystitis with hematuria        DISPOSITION  Disposition: Admit to telemetry  Patient condition is fair      NOTE: This report was transcribed using voice recognition software.  Every effort was made to ensure accuracy; however, inadvertent computerized transcription errors may be present     Cynthia Quiñonesma  09/18/20 0037

## 2020-09-18 NOTE — PLAN OF CARE
Problem: Inadequate oral food/beverage intake (NI-2.1)  Goal: Food and/or Nutrient Delivery  Continue Diet and ADAT. Will Start Glucerna Diabetic ONS BID. Description: Individualized approach for food/nutrient provision.   Outcome: Met This Shift

## 2020-09-18 NOTE — ED NOTES
Bed: 25  Expected date:   Expected time:   Means of arrival:   Comments:  EMS carolyn Hayden RN  05/28/50 8040

## 2020-09-18 NOTE — H&P
[facility] History and Physical        Chief Complaint:  dehydration  History of Present Illness   The patient is a 79 y.o. female    Presenting for persistent N/V x5 days, solids and liquids and dehydration, severe pre syncope and weaknss upon standing- now falls  +Nausea and vomiting--worse by trying to eat food or smelling food  Noted   gastric sleeve surgery in [place] 4 weeks ago. She called [facility] and they told her to go to the emergency department to have them do a esophagram to assess for stricture-- which was done and was reportedly negative  Also at ER yesterday --she was told she had a \"UTI\"- she denies fever, dysuria, or supraumbiical pain- but must have had UTI- \"causing her to get weak and fall\"--she has been vomiting the omnicef since. She feels OK when supine, when standing she gets severely presyncopal and weakness -legs feel like buckeling with severe head spinning  Cr recently N, 0.8 then 1.1 yesterday but now severe OLIVIA- noting dec UO and cr. 2.4 now  Orthostatics NOT done in ER, I will order    - hx taken from the patient  REVIEW OF SYSTEMS:  no fevers, chills, cp, sob, n/v, ha, vision/hearing changes, wt changes, hot/cold flashes, other open skin lesions, diarrhea, constipation, dysuria/hematuria unless noted in HPI. Complete ROS performed with the patient and is otherwise negative. Past Medical History:      Diagnosis Date    Arthritis     Cerebral artery occlusion with cerebral infarction [facility]) 1998?    tia     Cigarette nicotine dependence without complication 2/21/5081    [facility] [address]     Depression 10/2014    \"doing ok\". anxiety    Diabetes mellitus ([facility] [address]     prediabetic    GERD (gastroesophageal reflux disease)     Hyperlipidemia     Hypertension     Hypothyroidism     Obese 10/2014    states weight aprox. 400 lbs.     SHALA (obstructive sleep apnea)     no treatment    Palate mass        Past Surgical History:        Procedure Laterality Date    APPENDECTOMY      CARPAL TUNNEL RELEASE Bilateral     17 years ago    COLONOSCOPY      ENDOSCOPY, COLON, DIAGNOSTIC      FOOT SURGERY Right 2009    FOOT SURGERY  2014    reconstructive for hammer toes all four, not big toe left foot    HAMMER TOE SURGERY Left 03/06/2017    HYSTERECTOMY      JOINT REPLACEMENT  bilat knee    OTHER SURGICAL HISTORY Left 11/5/2014    hammer toe correction 2-4left foot and hardware;left calcaneous x2,flexor digital longis repair    TONSILLECTOMY         Home Medications:  Prior to Admission medications    Medication Sig Start Date End Date Taking?  Authorizing Provider   cefdinir (OMNICEF) 300 MG capsule Take 1 capsule by mouth 2 times daily for 7 days 9/16/20 9/23/20  Sherman Bending, DO   ondansetron (ZOFRAN ODT) 4 MG disintegrating tablet Place 1 tablet under the tongue every 8 hours as needed for Nausea or Vomiting 9/16/20   Sherman Bending, DO   albuterol (PROVENTIL) (2.5 MG/3ML) 0.083% nebulizer solution Take 2.5 mg by nebulization daily as needed for Wheezing    Historical Provider, MD   fluticasone-vilanterol (BREO ELLIPTA) 100-25 MCG/INH AEPB inhaler Inhale 1 puff into the lungs daily    Historical Provider, MD   fluticasone (FLONASE) 50 MCG/ACT nasal spray 1 spray by Each Nostril route daily as needed for Rhinitis    Historical Provider, MD   trimethoprim-polymyxin b (POLYTRIM) 91894-2.1 UNIT/ML-% ophthalmic solution Place 1 drop into both eyes every 6 hours as needed    Historical Provider, MD   lisinopril (PRINIVIL;ZESTRIL) 10 MG tablet Take 10 mg by mouth daily    Historical Provider, MD   metFORMIN (GLUCOPHAGE) 500 MG tablet Take 850 mg by mouth daily (with breakfast)     Historical Provider, MD   pantoprazole (PROTONIX) 20 MG tablet Take 1 tablet by mouth daily  Patient taking differently: Take 40 mg by mouth daily  5/2/19   Josiah Castañeda MD   vitamin D (CHOLECALCIFEROL) 1000 UNIT TABS tablet Take 1,000 Units by mouth daily    Historical Provider, MD   LEVOTHYROXINE SODIUM PO Take 0.5 mg by mouth daily Patient takes 2 tablets daily    Historical Provider, MD   LEVOTHYROXINE SODIUM PO Take 0.2 mg by mouth daily    Historical Provider, MD   apixaban (ELIQUIS) 5 MG TABS tablet Take 1 tablet by mouth 2 times daily 11/26/18   ELIJAH Manuel   metoprolol tartrate (LOPRESSOR) 25 MG tablet Take 1 tablet by mouth 2 times daily Take morning of surgery with a sip of water 11/26/18   ELIJAH Manuel   atorvastatin (LIPITOR) 40 MG tablet Take 40 mg by mouth daily    Historical Provider, MD   amLODIPine (NORVASC) 5 MG tablet Take 5 mg by mouth 2 times daily Take morning of surgery with a sip of water    Historical Provider, MD   cetirizine (ZYRTEC) 5 MG tablet Take 10 mg by mouth daily     Historical Provider, MD   docusate sodium (COLACE) 100 MG capsule Take 100 mg by mouth 2 times daily     Historical Provider, MD   aspirin 81 MG tablet Take 81 mg by mouth daily LD 3/7/18 per surgeon    Historical Provider, MD       Allergies:  Food; Iodine; Darvocet [propoxyphene n-acetaminophen]; Influenza vaccines; Lamictal [lamotrigine]; Other; Pentazocine lactate; and Talwin [pentazocine]    Social History:   TOBACCO:   reports that she has quit smoking. Her smoking use included cigarettes and cigars. She has never used smokeless tobacco.  ETOH:   reports no history of alcohol use. Family History:       Problem Relation Age of Onset    Heart Disease Mother     Cancer Father     Cancer Sister     Cancer Brother     Cancer Son       Or deferred/otherwise considered non contributory to current admission  PHYSICAL EXAM:    VS: BP (!) 79/38   Pulse 90   Temp 98 °F (36.7 °C)   Resp 18   Ht 5' 5\" (1.651 m)   Wt (!) 409 lb (185.5 kg)   SpO2 98%   BMI 68.06 kg/m²     General Appearance:     no acute distress. Psych:  HEENT:    A.O.  As per HPI details  NC/AT, PERRL, no pallor no icterus, lips/ext mucous membrane grossly dry Value Date    TSH 0.073 2018     Cardiac Enzymes:   Lab Results   Component Value Date    CKTOTAL 218 (H) 2016    CKTOTAL 284 (H) 2016    CKTOTAL 54 2016    CKMB 4.5 (H) 2016    CKMB 5.7 (H) 2016    CKMB <1.0 2016    TROPONINI <0.01 2020    TROPONINI <0.01 2019    TROPONINI <0.01 10/31/2018       Radiology: Ct Head Wo Contrast    Result Date: 2020  Patient MRN:  [id number] : 1950 Age: 79 years Gender: Female Order Date:  2020 11:35 PM TECHNIQUE/NUMBER OF IMAGES/COMPARISON/CLINICAL HISTORY: CT brain Axial images were obtained sagittal and coronal reconstructions Comparison  66-year-old female patient history of trauma injury pain. FINDINGS: There are unremarkable appearance for peripheral CSF space and ventricular system. There is no focal mass defect or midline shift. There is no evidence for a sizable area of an acute or recent insult in progression to the brain parenchyma. Midline structures have unremarkable appearance. There is no indication for an acute intracranial hemorrhagic event. Images with bone window settings demonstrate no significant findings. No indication for an acute intracranial process. Ct Cervical Spine Wo Contrast    Result Date: 2020  Clinical indications: Pain status post trauma. COMPARISON: X-rays of the cervical spine 2019 Exposure control: This examination and all examinations utilizing ionizing radiation at this facility done so according to the ALARA (as low as reasonably achievable) principal for radiation dose reduction. TECHNIQUE: Axial, sagittal, and coronal computed tomography of the cervical spine was performed without contrast. FINDINGS: These images reveal no evidence for acute displaced cortical disruption or spondylolisthesis. There is mild cervical kyphosis.  There is diffuse loss of disc height, degenerative spondylosis, uncovertebral hypertrophy and facet dehydration, severe pre syncope and weaknss upon standing- now falls  +Nausea and vomiting--worse by trying to eat food or smelling food  Noted   gastric sleeve surgery in [facility] 4 weeks ago. She called [facility] clinic and they told her to go to the emergency department to have them do a esophagram to assess for stricture-- which was done and was reportedly negative    Plan liquid diet  PPI  [facility]-- ?EGD, recurrent refractory n/V - one month ago gastric sleeve done at - negative esophagram    Also at ER yesterday --she was told she had a \"UTI\"- she denies fever, dysuria, or supraumbiical pain- but must have had UTI- \"causing her to get weak and fall\"--she has been vomiting the omnicef since. Will continue rocephin, but more likely urinary colonization then UTI    Dehydration severe +OLIVIA  And orthostatic hypotension  She feels OK when supine, when standing she gets severely presyncopal and weakness -legs feel like buckeling with severe head spinning  Cr recently N, 0.8 then 1.1 yesterday but now severe OLIVIA- noting dec UO and cr. 2.4 now  Orthostatics NOT done in ER, I will order  Noted also she presents in afib c RVR-- last admission reportedly sinus rhythm  But admission before afib noted  (already on elilquis and metoprolol)  Will hold BP meds like norvasc but continue AV blocking meds      dm2  aic 7.0-but now not eating and losing weight-- will stop metformin bc losing weight now and GI distress    Hypothyroidism- usually on 250 thyroid, but called and stated recently that doc said that TSH low- needs to dec dose  Will hold thyroid x2 days, then recommend a decreased dose.     Alessandro Huber MD   Night Officer, overnight admitting doctor [facility] call day time doctor   for questions after 7:30am    Covering for [facility] Service  If Qs please call [phone]  Electronically signed by Nahid Campos MD on 9/18/2020 at 12:28 AM

## 2020-09-18 NOTE — PROGRESS NOTES
Dr. Jun Russell updated on bp 72/36 post bolus. No new orders at this time. Will continue to monitor.

## 2020-09-18 NOTE — CONSULTS
GENERAL SURGERY  CONSULT NOTE  9/18/2020    Physician Consulted: Dr. Cj Costa  CC: Nausea/Vomiting  Referring Physician: Dr. Valdez Proctor is a 79 y.o. female with hx of morbid obesity s/p sleeve gastrectomy, who presents for evaluation of nausea and vomiting. She states that for the last 4-5 days she has had nausea and vomiting of solids and liquids. She denies any abdominal pain, melena, hematochezia, changes in bowel habits, fever, or chills. She attributes this nausea and vomiting to her UTI that she states she has. Currently, her nausea and vomiting have stopped and liquids are staying down. Her only abdominal surgery was the recent sleeve gastrectomy which was performed at [place] [address] in [facility] [place] about \"4-9 weeks ago\". She is on Eliquis, last dose was yesterday. Past Medical History:   Diagnosis Date    Arthritis     Cerebral artery occlusion with cerebral infarction [facility]) 1998?    tia     Cigarette nicotine dependence without complication 7/81/0578    COPD (chronic obstructive pulmonary disease) ([address]     Depression 10/2014    \"doing ok\". anxiety    Diabetes mellitus ([facility] [address]     prediabetic    GERD (gastroesophageal reflux disease)     Hyperlipidemia     Hypertension     Hypothyroidism     Obese 10/2014    states weight aprox. 400 lbs.     SHALA (obstructive sleep apnea)     no treatment    Palate mass        Past Surgical History:   Procedure Laterality Date    APPENDECTOMY      CARPAL TUNNEL RELEASE Bilateral     17 years ago    COLONOSCOPY      ENDOSCOPY, COLON, DIAGNOSTIC      FOOT SURGERY Right 2009    FOOT SURGERY  2014    reconstructive for hammer toes all four, not big toe left foot    HAMMER TOE SURGERY Left 03/06/2017    HYSTERECTOMY      JOINT REPLACEMENT  bilat knee    OTHER SURGICAL HISTORY Left 11/5/2014    hammer toe correction 2-4left foot and hardware;left calcaneous x2,flexor digital longis repair    TONSILLECTOMY         Medications Prior to Admission:    Prior to Admission medications    Medication Sig Start Date End Date Taking?  Authorizing Provider   cefdinir (OMNICEF) 300 MG capsule Take 1 capsule by mouth 2 times daily for 7 days 9/16/20 9/23/20 Yes Lenard Chavez DO   ondansetron (ZOFRAN ODT) 4 MG disintegrating tablet Place 1 tablet under the tongue every 8 hours as needed for Nausea or Vomiting 9/16/20  Yes Latasha Malcolm DO   albuterol (PROVENTIL) (2.5 MG/3ML) 0.083% nebulizer solution Take 2.5 mg by nebulization daily as needed for Wheezing   Yes Historical Provider, MD   fluticasone-vilanterol (BREO ELLIPTA) 100-25 MCG/INH AEPB inhaler Inhale 1 puff into the lungs daily   Yes Historical Provider, MD   fluticasone (FLONASE) 50 MCG/ACT nasal spray 1 spray by Each Nostril route daily as needed for Rhinitis   Yes Historical Provider, MD   trimethoprim-polymyxin b (POLYTRIM) 75071-6.1 UNIT/ML-% ophthalmic solution Place 1 drop into both eyes every 6 hours as needed   Yes Historical Provider, MD   lisinopril (PRINIVIL;ZESTRIL) 10 MG tablet Take 10 mg by mouth daily   Yes Historical Provider, MD   metFORMIN (GLUCOPHAGE) 500 MG tablet Take 850 mg by mouth daily (with breakfast)    Yes Historical Provider, MD   pantoprazole (PROTONIX) 20 MG tablet Take 1 tablet by mouth daily  Patient taking differently: Take 40 mg by mouth daily  5/2/19  Yes Feroz Agrawal MD   vitamin D (CHOLECALCIFEROL) 1000 UNIT TABS tablet Take 1,000 Units by mouth daily   Yes Historical Provider, MD   LEVOTHYROXINE SODIUM PO Take 0.5 mg by mouth daily Patient takes 2 tablets daily   Yes Historical Provider, MD   LEVOTHYROXINE SODIUM PO Take 0.2 mg by mouth daily   Yes Historical Provider, MD   apixaban (ELIQUIS) 5 MG TABS tablet Take 1 tablet by mouth 2 times daily 11/26/18  Yes ELIJAH Zayas   metoprolol tartrate (LOPRESSOR) 25 MG tablet Take 1 tablet by mouth 2 times daily Take morning of surgery with a sip of water 11/26/18  Yes ELIJAH Zayas   atorvastatin (LIPITOR) 40 MG tablet Take 40 mg by mouth daily   Yes Historical Provider, MD   amLODIPine (NORVASC) 5 MG tablet Take 5 mg by mouth 2 times daily Take morning of surgery with a sip of water   Yes Historical Provider, MD   cetirizine (ZYRTEC) 5 MG tablet Take 10 mg by mouth daily    Yes Historical Provider, MD   docusate sodium (COLACE) 100 MG capsule Take 100 mg by mouth 2 times daily    Yes Historical Provider, MD   aspirin 81 MG tablet Take 81 mg by mouth daily LD 3/7/18 per surgeon   Yes Historical Provider, MD       Allergies   Allergen Reactions    Food Shortness Of Breath, Swelling and Rash     Fresh peaches, lobster, crab legs.  Iodine Hives and Shortness Of Breath    Darvocet [Propoxyphene N-Acetaminophen] Hives    Influenza Vaccines Hives    Lamictal [Lamotrigine]     Other Hives     Pneumonia vaccine. \"sheets\" , detergent.  Pentazocine Lactate Hives    Talwin [Pentazocine] Nausea And Vomiting       Family History   Problem Relation Age of Onset    Heart Disease Mother     Cancer Father     Cancer Sister     Cancer Brother     Cancer Son        Social History     Tobacco Use    Smoking status: Former Smoker     Types: Cigarettes, Cigars    Smokeless tobacco: Never Used    Tobacco comment: quit 2018   Substance Use Topics    Alcohol use: No    Drug use: No         Review of Systems   General ROS: negative  Hematological and Lymphatic ROS: negative  Respiratory ROS: negative  Cardiovascular ROS: negative  Gastrointestinal ROS: As above  Genito-Urinary ROS: negative  Musculoskeletal ROS: negative      PHYSICAL EXAM:    Vitals:    09/18/20 0940   BP: (!) 72/36   Pulse:    Resp:    Temp:    SpO2:        General Appearance:  awake, alert, oriented, in no acute distress  Skin:  Skin color, texture, turgor normal. No rashes or lesions. Head/face:  NCAT  Eyes:  No gross abnormalities.   Lungs:  No increased work of breathing  Heart:  Hypotensive and RR  Abdomen:  Soft, non-tender, non-distended. Incisions c/d/i from prior surgery  Musculoskeletal : Extremities warm to touch    LABS:    CBC  Recent Labs     20   WBC 8.7   HGB 13.8   HCT 45.3        BMP  Recent Labs     20      K 4.3      CO2 25   BUN 20   CREATININE 2.4*   CALCIUM 9.3     Liver Function  Recent Labs     205   BILITOT 0.2   AST 16   ALT 18   ALKPHOS 70   PROT 6.6   LABALBU 3.4*     No results for input(s): LACTATE in the last 72 hours. No results for input(s): INR, PTT in the last 72 hours. Invalid input(s): PT    RADIOLOGY    Ct Head Wo Contrast    Result Date: 2020  Patient MRN:  [id number] : 1950 Age: 79 years Gender: Female Order Date:  2020 11:35 PM TECHNIQUE/NUMBER OF IMAGES/COMPARISON/CLINICAL HISTORY: CT brain Axial images were obtained sagittal and coronal reconstructions Comparison  78-year-old female patient history of trauma injury pain. FINDINGS: There are unremarkable appearance for peripheral CSF space and ventricular system. There is no focal mass defect or midline shift. There is no evidence for a sizable area of an acute or recent insult in progression to the brain parenchyma. Midline structures have unremarkable appearance. There is no indication for an acute intracranial hemorrhagic event. Images with bone window settings demonstrate no significant findings. No indication for an acute intracranial process. Ct Cervical Spine Wo Contrast    Result Date: 2020  Clinical indications: Pain status post trauma. COMPARISON: X-rays of the cervical spine 2019 Exposure control: This examination and all examinations utilizing ionizing radiation at this facility done so according to the ALARA (as low as reasonably achievable) principal for radiation dose reduction.  TECHNIQUE: Axial, sagittal, and coronal computed tomography of the cervical spine was performed without contrast. FINDINGS: These images reveal no

## 2020-09-18 NOTE — CARE COORDINATION
Social Work/Discharge Planning:  Social Work consult noted. Met with patient and completed initial assessment. Explained Social Work role and discussed transition of care/discharge planning. Patient lives alone in a one story house with a lift to enter. PTA she uses a rollator walker. She has a cane, bedside commode, shower chair, power wheelchair, nebulizer and a hospital bed. Patient states she has a cpap or bipap and will have her daughter bring unit to hospital.  Patient states she had gastric sleeve surgery 8 weeks ago in [facility]. Patient is currently on 3 liters of oxygen but she does not wear oxygen at home. She has caregivers five days a week for 8 hours a day through [address] [place] [address]. She has a snf history at [facility]. Informed patient that therapy to evaluate. Patient is interested in rehab at [facility]. Patient states her insurance to change in October to [address] [facility]. Referral made to liaison Adali Guzman at [facility] and she will review patient information. Adali Guzman states patient will need a COVID test within seven days of discharge. Will continue to follow and assist with discharge planning.   Electronically signed by NADIA Lin on 9/18/2020 at 8:56 AM

## 2020-09-18 NOTE — ED NOTES
AYESHAAR faxed to [address] [place] [address] for confirmation of receipt.      Luis Felipe Alfaro, RN  09/18/20 8649

## 2020-09-19 ENCOUNTER — APPOINTMENT (OUTPATIENT)
Dept: ULTRASOUND IMAGING | Age: 70
DRG: 682 | End: 2020-09-19
Payer: COMMERCIAL

## 2020-09-19 LAB
ANION GAP SERPL CALCULATED.3IONS-SCNC: 7 MMOL/L (ref 7–16)
BUN BLDV-MCNC: 21 MG/DL (ref 8–23)
CALCIUM SERPL-MCNC: 8.9 MG/DL (ref 8.6–10.2)
CHLORIDE BLD-SCNC: 106 MMOL/L (ref 98–107)
CO2: 24 MMOL/L (ref 22–29)
CREAT SERPL-MCNC: 2.3 MG/DL (ref 0.5–1)
GFR AFRICAN AMERICAN: 25
GFR NON-AFRICAN AMERICAN: 21 ML/MIN/1.73
GLUCOSE BLD-MCNC: 88 MG/DL (ref 74–99)
HCT VFR BLD CALC: 44.1 % (ref 34–48)
HEMOGLOBIN: 13.2 G/DL (ref 11.5–15.5)
MCH RBC QN AUTO: 28.9 PG (ref 26–35)
MCHC RBC AUTO-ENTMCNC: 29.9 % (ref 32–34.5)
MCV RBC AUTO: 96.5 FL (ref 80–99.9)
PDW BLD-RTO: 14.9 FL (ref 11.5–15)
PLATELET # BLD: 211 E9/L (ref 130–450)
PMV BLD AUTO: 11.5 FL (ref 7–12)
POTASSIUM SERPL-SCNC: 4.4 MMOL/L (ref 3.5–5)
RBC # BLD: 4.57 E12/L (ref 3.5–5.5)
SODIUM BLD-SCNC: 137 MMOL/L (ref 132–146)
TOTAL CK: 57 U/L (ref 20–180)
WBC # BLD: 7.9 E9/L (ref 4.5–11.5)

## 2020-09-19 PROCEDURE — P9047 ALBUMIN (HUMAN), 25%, 50ML: HCPCS | Performed by: INTERNAL MEDICINE

## 2020-09-19 PROCEDURE — 99233 SBSQ HOSP IP/OBS HIGH 50: CPT | Performed by: INTERNAL MEDICINE

## 2020-09-19 PROCEDURE — 6360000002 HC RX W HCPCS: Performed by: INTERNAL MEDICINE

## 2020-09-19 PROCEDURE — 2700000000 HC OXYGEN THERAPY PER DAY

## 2020-09-19 PROCEDURE — 87324 CLOSTRIDIUM AG IA: CPT

## 2020-09-19 PROCEDURE — 36415 COLL VENOUS BLD VENIPUNCTURE: CPT

## 2020-09-19 PROCEDURE — 82550 ASSAY OF CK (CPK): CPT

## 2020-09-19 PROCEDURE — 2580000003 HC RX 258: Performed by: INTERNAL MEDICINE

## 2020-09-19 PROCEDURE — 99232 SBSQ HOSP IP/OBS MODERATE 35: CPT | Performed by: SURGERY

## 2020-09-19 PROCEDURE — 94640 AIRWAY INHALATION TREATMENT: CPT

## 2020-09-19 PROCEDURE — 2060000000 HC ICU INTERMEDIATE R&B

## 2020-09-19 PROCEDURE — APPSS30 APP SPLIT SHARED TIME 16-30 MINUTES: Performed by: NURSE PRACTITIONER

## 2020-09-19 PROCEDURE — 87449 NOS EACH ORGANISM AG IA: CPT

## 2020-09-19 PROCEDURE — 85027 COMPLETE CBC AUTOMATED: CPT

## 2020-09-19 PROCEDURE — 6370000000 HC RX 637 (ALT 250 FOR IP): Performed by: INTERNAL MEDICINE

## 2020-09-19 PROCEDURE — 76770 US EXAM ABDO BACK WALL COMP: CPT

## 2020-09-19 PROCEDURE — 80048 BASIC METABOLIC PNL TOTAL CA: CPT

## 2020-09-19 RX ORDER — SODIUM CHLORIDE 9 MG/ML
INJECTION, SOLUTION INTRAVENOUS CONTINUOUS
Status: DISCONTINUED | OUTPATIENT
Start: 2020-09-19 | End: 2020-09-20

## 2020-09-19 RX ORDER — ALBUMIN (HUMAN) 12.5 G/50ML
25 SOLUTION INTRAVENOUS EVERY 12 HOURS
Status: COMPLETED | OUTPATIENT
Start: 2020-09-19 | End: 2020-09-20

## 2020-09-19 RX ADMIN — SODIUM CHLORIDE, PRESERVATIVE FREE 10 ML: 5 INJECTION INTRAVENOUS at 09:50

## 2020-09-19 RX ADMIN — ALBUMIN (HUMAN) 25 G: 0.25 INJECTION, SOLUTION INTRAVENOUS at 18:43

## 2020-09-19 RX ADMIN — APIXABAN 5 MG: 5 TABLET, FILM COATED ORAL at 09:47

## 2020-09-19 RX ADMIN — ARFORMOTEROL TARTRATE 15 MCG: 15 SOLUTION RESPIRATORY (INHALATION) at 19:24

## 2020-09-19 RX ADMIN — APIXABAN 5 MG: 5 TABLET, FILM COATED ORAL at 20:29

## 2020-09-19 RX ADMIN — CETIRIZINE HYDROCHLORIDE 10 MG: 10 TABLET, FILM COATED ORAL at 09:47

## 2020-09-19 RX ADMIN — SODIUM CHLORIDE: 9 INJECTION, SOLUTION INTRAVENOUS at 18:44

## 2020-09-19 RX ADMIN — PANTOPRAZOLE SODIUM 40 MG: 40 TABLET, DELAYED RELEASE ORAL at 09:47

## 2020-09-19 RX ADMIN — ACETAMINOPHEN 650 MG: 325 TABLET ORAL at 23:15

## 2020-09-19 RX ADMIN — BUDESONIDE 500 MCG: 0.5 SUSPENSION RESPIRATORY (INHALATION) at 19:24

## 2020-09-19 RX ADMIN — ACETAMINOPHEN 650 MG: 325 TABLET ORAL at 11:05

## 2020-09-19 RX ADMIN — ASPIRIN 81 MG: 81 TABLET, COATED ORAL at 09:48

## 2020-09-19 RX ADMIN — DOCUSATE SODIUM 100 MG: 100 CAPSULE, LIQUID FILLED ORAL at 20:29

## 2020-09-19 RX ADMIN — SODIUM CHLORIDE, PRESERVATIVE FREE 10 ML: 5 INJECTION INTRAVENOUS at 20:30

## 2020-09-19 RX ADMIN — ATORVASTATIN CALCIUM 40 MG: 40 TABLET, FILM COATED ORAL at 09:47

## 2020-09-19 RX ADMIN — CEFTRIAXONE 1 G: 1 INJECTION, POWDER, FOR SOLUTION INTRAMUSCULAR; INTRAVENOUS at 11:05

## 2020-09-19 ASSESSMENT — PAIN - FUNCTIONAL ASSESSMENT: PAIN_FUNCTIONAL_ASSESSMENT: PREVENTS OR INTERFERES SOME ACTIVE ACTIVITIES AND ADLS

## 2020-09-19 ASSESSMENT — PAIN DESCRIPTION - ONSET: ONSET: ON-GOING

## 2020-09-19 ASSESSMENT — PAIN SCALES - GENERAL
PAINLEVEL_OUTOF10: 8
PAINLEVEL_OUTOF10: 8
PAINLEVEL_OUTOF10: 0
PAINLEVEL_OUTOF10: 10
PAINLEVEL_OUTOF10: 0

## 2020-09-19 ASSESSMENT — PAIN DESCRIPTION - PROGRESSION: CLINICAL_PROGRESSION: NOT CHANGED

## 2020-09-19 ASSESSMENT — PAIN DESCRIPTION - PAIN TYPE
TYPE: ACUTE PAIN
TYPE: CHRONIC PAIN

## 2020-09-19 ASSESSMENT — PAIN DESCRIPTION - DESCRIPTORS
DESCRIPTORS: ACHING;CONSTANT;DISCOMFORT
DESCRIPTORS: ACHING

## 2020-09-19 ASSESSMENT — PAIN DESCRIPTION - FREQUENCY: FREQUENCY: CONTINUOUS

## 2020-09-19 ASSESSMENT — PAIN DESCRIPTION - LOCATION
LOCATION: SACRUM
LOCATION: HEAD

## 2020-09-19 NOTE — PROGRESS NOTES
Patient transferred into new speciality bed, while transferring patient, patient removed IV site. Attempted to two to reinsert IV, unsuccessful. Notified clinical manager for new IV site. Patient requires ultrasound for IV insertion.  Electronically signed by Carmen Bloom RN on 9/18/2020 at 11:13 PM

## 2020-09-19 NOTE — PROGRESS NOTES
General Surgery  Attending Physician Progress Note    Chief Complaint   Patient presents with    Fall     fell this AM.  seen in ED. Arben Reyez again this evening hitting head    Back Pain     from fall this evening    Head Injury     takes Eliquis       Subjective: Tolerating a diet. having some diarrhea which she said she has had since her gastric sleeve surgery and has bene taking imodium per her surgeon's recommendations. ROS: no cp    I reviewed the patient's Past Medical History, Past Surgical History, Medications, and Allergies.     LABS:  CBC:   Lab Results   Component Value Date    WBC 7.9 09/19/2020    RBC 4.57 09/19/2020    HGB 13.2 09/19/2020    HCT 44.1 09/19/2020    MCV 96.5 09/19/2020    MCH 28.9 09/19/2020    MCHC 29.9 09/19/2020    RDW 14.9 09/19/2020     09/19/2020    MPV 11.5 09/19/2020     CMP:    Lab Results   Component Value Date     09/19/2020    K 4.4 09/19/2020    K 4.0 10/31/2018     09/19/2020    CO2 24 09/19/2020    BUN 21 09/19/2020    CREATININE 2.3 09/19/2020    GFRAA 25 09/19/2020    LABGLOM 21 09/19/2020    GLUCOSE 88 09/19/2020    PROT 5.9 09/18/2020    LABALBU 3.0 09/18/2020    CALCIUM 8.9 09/19/2020    BILITOT <0.2 09/18/2020    ALKPHOS 62 09/18/2020    AST 15 09/18/2020    ALT 15 09/18/2020     BMP:    Lab Results   Component Value Date     09/19/2020    K 4.4 09/19/2020    K 4.0 10/31/2018     09/19/2020    CO2 24 09/19/2020    BUN 21 09/19/2020    LABALBU 3.0 09/18/2020    CREATININE 2.3 09/19/2020    CALCIUM 8.9 09/19/2020    GFRAA 25 09/19/2020    LABGLOM 21 09/19/2020     Hepatic Function Panel:    Lab Results   Component Value Date    ALKPHOS 62 09/18/2020    ALT 15 09/18/2020    AST 15 09/18/2020    PROT 5.9 09/18/2020    BILITOT <0.2 09/18/2020    BILIDIR <0.2 10/27/2016    IBILI 0.0 10/27/2016    LABALBU 3.0 09/18/2020     PT/INR:    Lab Results   Component Value Date    PROTIME 11.9 11/23/2016    INR 1.1 11/23/2016     Warfarin PT/INR:

## 2020-09-19 NOTE — CONSULTS
Nephrology Consult Note    Patient's Name: Jessica Ramirez  5:38 PM  9/19/2020    Nephrologist: Dr. Kenya Castro MD    Reason for Consult: Renal is consulted for acute kidney injury  Requesting Physician:  Dr. Angela Sosa  Chief Complaint: Intractable nausea and vomiting, weakness and fall    History Obtained From: Patient and EMR    History of Present Ilness:    Jessica Ramirez is a 79 y.o. female with past medical history of morbid obesity, chronic kidney disease stage III with a baseline creatinine of 1.3-1.7, follows with Dr. Kp Lamb in our group, essential hypertension, hypothyroidism, type 2 diabetes, recent gastric sleeve surgery at [facility], presented with chief complaints of 5-day history of intractable nausea and vomiting at home. She also had 2 falls at home, one was a mechanical fall as she tripped over her dog, other fall was from lightheadedness, she fell in the bathroom. She states that she was unable to keep anything down but continue to take all her medications including lisinopril until the day of presentation. She denies taking any nonsteroidal anti-inflammatory drugs at home for pain control. Denies any urinary complaints. Her blood pressure on admission was 74/55. She received over 4 L of IV normal saline so far, currently off of IV fluids  She is on a clear liquid diet. Blood pressure still remains very low. She denies any shortness of breath, chest pain or worsening edema  Reports a 80 pound weight loss since her gastric sleeve surgery    Past Medical History:   Diagnosis Date    Arthritis     Cerebral artery occlusion with cerebral infarction ([facility] [address] 1998?    tia     Cigarette nicotine dependence without complication 5/88/0307    COPD (chronic obstructive pulmonary disease) ([address]     Depression 10/2014    \"doing ok\".   anxiety    Diabetes mellitus ([address]     prediabetic    GERD (gastroesophageal reflux disease)     Hyperlipidemia     Hypertension     Hypothyroidism     Obese 10/2014 states weight aprox. 400 lbs.  SHALA (obstructive sleep apnea)     no treatment    Palate mass        Past Surgical History:   Procedure Laterality Date    APPENDECTOMY      CARPAL TUNNEL RELEASE Bilateral     17 years ago    COLONOSCOPY      ENDOSCOPY, COLON, DIAGNOSTIC      FOOT SURGERY Right 2009    FOOT SURGERY  2014    reconstructive for hammer toes all four, not big toe left foot    HAMMER TOE SURGERY Left 03/06/2017    HYSTERECTOMY      JOINT REPLACEMENT  bilat knee    OTHER SURGICAL HISTORY Left 11/5/2014    hammer toe correction 2-4left foot and hardware;left calcaneous x2,flexor digital longis repair    TONSILLECTOMY         Family History   Problem Relation Age of Onset    Heart Disease Mother     Cancer Father     Cancer Sister     Cancer Brother     Cancer Son         reports that she has quit smoking. Her smoking use included cigarettes and cigars. She has never used smokeless tobacco. She reports that she does not drink alcohol or use drugs. Allergies:  Food; Iodine; Darvocet [propoxyphene n-acetaminophen]; Influenza vaccines; Lamictal [lamotrigine];  Other; Pentazocine lactate; and Talwin [pentazocine]    Current Medications:    cefTRIAXone (ROCEPHIN) 1 g in sterile water 10 mL IV syringe, Q24H  albuterol (PROVENTIL) nebulizer solution 2.5 mg, Daily PRN  apixaban (ELIQUIS) tablet 5 mg, BID  aspirin EC tablet 81 mg, Daily  atorvastatin (LIPITOR) tablet 40 mg, Daily  cetirizine (ZYRTEC) tablet 10 mg, Daily  docusate sodium (COLACE) capsule 100 mg, BID  pantoprazole (PROTONIX) tablet 40 mg, Daily  sodium chloride flush 0.9 % injection 10 mL, 2 times per day  sodium chloride flush 0.9 % injection 10 mL, PRN  acetaminophen (TYLENOL) tablet 650 mg, Q6H PRN    Or  acetaminophen (TYLENOL) suppository 650 mg, Q6H PRN  polyethylene glycol (GLYCOLAX) packet 17 g, Daily PRN  promethazine (PHENERGAN) tablet 12.5 mg, Q6H PRN    Or  ondansetron (ZOFRAN) injection 4 mg, Q6H PRN  budesonide (PULMICORT) nebulizer suspension 500 mcg, BID  Arformoterol Tartrate (BROVANA) nebulizer solution 15 mcg, BID  miconazole nitrate 2 % ointment, BID        Review of Systems:   10 point review of system done at this time is negative except for the things mentioned in the HPI    Physical exam:   Constitutional:    Vitals: BP (!) 91/54   Pulse 91   Temp 98.4 °F (36.9 °C) (Oral)   Resp 16   Ht 5' 5\" (1.651 m)   Wt (!) 457 lb 3.2 oz (207.4 kg)   SpO2 98%   BMI 76.08 kg/m²      Morbidly obese elderly  female lying in bed, alert awake and oriented x3, appears in no acute distress, FiO2 requirement is at 3 L via nasal cannula  Skin: no rash, turgor wnl  Heent:  eomi, mmm  Neck: no bruits or jvd noted  Cardiovascular:  S1, S2 without m/r/g  Respiratory: CTA B without w/r/r, distant breath sounds, decreased in bases  Abdomen:  +bs, soft, nt, distended and obese  Ext: Trace bilateral lower extremity edema  Psychiatric: mood and affect appropriate  Musculoskeletal:  Rom, muscular strength intact    Data:   Labs:  CBC:   Lab Results   Component Value Date    WBC 7.9 09/19/2020    RBC 4.57 09/19/2020    HGB 13.2 09/19/2020    HCT 44.1 09/19/2020    MCV 96.5 09/19/2020    MCH 28.9 09/19/2020    MCHC 29.9 09/19/2020    RDW 14.9 09/19/2020     09/19/2020    MPV 11.5 09/19/2020     CMP:    Lab Results   Component Value Date     09/19/2020    K 4.4 09/19/2020    K 4.0 10/31/2018     09/19/2020    CO2 24 09/19/2020    BUN 21 09/19/2020    CREATININE 2.3 09/19/2020    GFRAA 25 09/19/2020    LABGLOM 21 09/19/2020    GLUCOSE 88 09/19/2020    PROT 5.9 09/18/2020    LABALBU 3.0 09/18/2020    CALCIUM 8.9 09/19/2020    BILITOT <0.2 09/18/2020    ALKPHOS 62 09/18/2020    AST 15 09/18/2020    ALT 15 09/18/2020     BMP:    Lab Results   Component Value Date     09/19/2020    K 4.4 09/19/2020    K 4.0 10/31/2018     09/19/2020    CO2 24 09/19/2020    BUN 21 09/19/2020    LABALBU 3.0 09/18/2020 according to the ALARA (as low as reasonably achievable) principal for radiation dose reduction. TECHNIQUE: Axial, sagittal, and coronal computed tomography of the cervical spine was performed without contrast. FINDINGS: These images reveal no evidence for acute displaced cortical disruption or spondylolisthesis. There is mild cervical kyphosis. There is diffuse loss of disc height, degenerative spondylosis, uncovertebral hypertrophy and facet arthropathy. These degenerative osseous changes result in multilevel central and foraminal stenosis. There are calcifications within the regional arteries. Otherwise regional soft tissue and osseous structures are unremarkable. No acute fracture or spondylolisthesis is identified on CT of cervical spine. Diffuse degenerative disc and facet disease result in multilevel central and foraminal stenosis. Cervical kyphosis which can be due to muscle spasm or positional. Atherosclerotic vascular disease. Xr Chest Portable    Result Date: 2020  Clinical indications: Cough. Shortness of breath. TECHNIQUE: Single frontal projection of the chest (1 view). COMPARISON: 2019. FINDINGS: The heart is enlarged. There is calcification within thoracic aorta. Acromioclavicular arthropathy. The heart, lungs, mediastinum and regional skeleton are otherwise unremarkable. No evidence for acute cardiopulmonary process. Us Retroperitoneal Complete    Result Date: 2020  Patient MRN: [id number] : 1950 Age:  79 years Gender: Female Order Date: 2020 1:21 PM Exam: US RETROPERITONEAL COMPLETE Number of Images: 36 views Indication:   Possible obstruction and increasing creatinine Possible obstruction and increasing creatinine Comparison: None. Real-time sonogram study of the right and left kidneys shows normal size and shaped kidneys. The right kidney measures 10.0 x 5.5 x 5.8 cm , and the left kidney measures 11.0 x 4.9 x 4.8 cm .  Both kidneys have normal cortical thickness. There is no apparent hydronephrosis or renal mass. The retroperitoneal area appears unremarkable. No evidence of aneurysmal dilatation of the aorta is seen. The urinary bladder is not visualized. NORMAL RENAL SONOGRAM.       Assessment  1. Acute kidney injury, secondary to severe prerenal azotemia from intractable nausea and vomiting, severe hypotension, continued use of lisinopril in this setting. With 2 falls at home will also rule out underlying rhabdomyolysis/pigment induced nephropathy  2. Stage III chronic kidney disease with a baseline creatinine of 1.3-1.7 secondary to hypertension and diabetes  3. Urinary tract infection on IV ceftriaxone    Plan  1. Urine electrolytes reviewed, calculated fractional excretion of sodium is 0.3% strongly consistent with prerenal injury, renal ultrasound is unremarkable for an obstruction. Lisinopril has been discontinued. 2. Restart IV normal saline at 125 mL/h for 1 more day  3. IV albumin 25 g twice daily for 2 doses  4. Obtain total CK  5.  Continue to monitor the renal function and urine output closely    Thank you very much for the consult Dr. Ivelisse Koenig I will follow her closely along with                Electronically signed by Jasmyne Pierre MD on 9/19/2020 at 5:38 PM

## 2020-09-19 NOTE — PROGRESS NOTES
[facility] Progress Note    Admitting Date and Time: 9/17/2020 10:09 PM  Admit Dx: Acute renal failure (ARF) ([facility] [address] [N17.9]  Acute renal failure (ARF) (HCC) [N17.9]    Subjective:  Patient is being followed for Acute renal failure (ARF) ([facility] [address] [N17.9]  Acute renal failure (ARF) ([facility] [address] [N17.9]   Patient seen today with nursing at bedside. Complaining of back pain, this pain is chronic and has taken norco in past, but given her low BP it is not recommended at this time. She denies fever or chills. Denies nausea and vomiting. Per nursing did have a loose stool, c-diff was sent. No other concerns today. ROS: denies fever, chills, cp, sob, n/v, HA unless stated above.       cefTRIAXone (ROCEPHIN) IV  1 g Intravenous Q24H    apixaban  5 mg Oral BID    aspirin  81 mg Oral Daily    atorvastatin  40 mg Oral Daily    cetirizine  10 mg Oral Daily    docusate sodium  100 mg Oral BID    pantoprazole  40 mg Oral Daily    sodium chloride flush  10 mL Intravenous 2 times per day    budesonide  0.5 mg Nebulization BID    Arformoterol Tartrate  15 mcg Nebulization BID    metoprolol tartrate  25 mg Oral BID    miconazole nitrate   Topical BID     albuterol, 2.5 mg, Daily PRN  sodium chloride flush, 10 mL, PRN  acetaminophen, 650 mg, Q6H PRN    Or  acetaminophen, 650 mg, Q6H PRN  polyethylene glycol, 17 g, Daily PRN  promethazine, 12.5 mg, Q6H PRN    Or  ondansetron, 4 mg, Q6H PRN         Objective:    BP (!) 82/50   Pulse 98   Temp 98 °F (36.7 °C) (Oral)   Resp 16   Ht 5' 5\" (1.651 m)   Wt (!) 457 lb 3.2 oz (207.4 kg)   SpO2 94%   BMI 76.08 kg/m²     General Appearance: alert and oriented to person, place and time and in no acute distress  Skin: warm and dry, no rashes or lesions noted  Head: normocephalic and atraumatic  Eyes: pupils equal, round, and reactive to light, extraocular eye movements intact, conjunctivae normal  Neck: neck supple and non tender without mass   Pulmonary/Chest: diminished to auscultation bilaterally- no wheezes, rales or rhonchi, normal air movement, no respiratory distress on 3L NC  Cardiovascular: normal rate, normal S1 and S2 and no murmurs noted  Abdomen: obese, soft, non-tender, non-distended, normal bowel sounds  Extremities: no cyanosis, no clubbing and +2 edema bilateral lower extremities  Neurologic: awake, alert and speech normal        Recent Labs     09/16/20  1324 09/17/20  2255 09/18/20  0950    140 137   K 4.1 4.3 3.9    105 106   CO2 29 25 23   BUN 13 20 19   CREATININE 1.1* 2.4* 2.3*   GLUCOSE 102* 126* 91   CALCIUM 9.4 9.3 8.3*       Recent Labs     09/16/20  1149 09/17/20  2255 09/18/20  0950   WBC 6.4 8.7 8.0   RBC 5.26 4.76 4.30   HGB 15.2 13.8 12.4   HCT 48.5* 45.3 42.3   MCV 92.2 95.2 98.4   MCH 28.9 29.0 28.8   MCHC 31.3* 30.5* 29.3*   RDW 14.6 14.8 15.0    224 200   MPV 11.1 10.4 10.5           Assessment:    Principal Problem:    Hypotension due to hypovolemia  Active Problems:    Obesity hypoventilation syndrome (HCC)    OLIVIA (acute kidney injury) ([facility] [address]    COPD (chronic obstructive pulmonary disease) ([facility] [address]    Morbid obesity with BMI of 45.0-49.9, adult ([facility])    DM (diabetes mellitus), type 2 (HCC)    PAF (paroxysmal atrial fibrillation) ([facility])    Pre-syncope    Intractable vomiting with nausea    Acute cystitis with hematuria    Acute renal failure (ARF) ([facility] [address]  Resolved Problems:    * No resolved hospital problems. *      Plan:  1. OLIVIA vs. CKD: Baseline Cr 1.1-1.7, in ED Cr-2.4, today Cr-2.3. Continue IVF. Monitor I&O closely. Follow BMP. Check renal US today. Consulted nephrology Dr. Kt Li as she has seen him past, appreciate input. 2. Acute Hypoxic Respiratory Failure: Possibly related to morbid obesity with SHALA vs. Fluid overload. Currently on 3L NC, wean O2 keep SPO2 >92%. D-dimer negative. Unable to perform CT of chest due to her size. Monitor closely.    3. UTI vs. Bacteruria: UA large leukocyte eserate/+nitrate/many bacteria, urine culture pending. Eder Chao, may need to update antibiotic when final result of culture results. 4. Fall/Weakness: CT of head negative. Likely related to OLIVIA. Continue with fall precautions. Continue PT/OT. ? SNF placement after discharge. 5. Atiral Fibrillation: Continue telemetry monitoring. Continue Eliquis. Will discontinue metoprolol due to hypotension. Monitor BP closely. May need to add Midodrine. 6. Pre-diabetes: Hemoglobin A1C-5.6 stable. Glucose trending downward. BG 88 this AM. Continue to monitor. Recommend follow up A1C check to monitor for pre-diabetes. 7. Nausea & Vomiting: History of sleeve gastrectomy. General surgery following, no plans EGD at this time. Recommending follow-up with surgeon at [facility]. 8. HTN: Continue Metoprolol. 9. Hyerplipidemia: Cotnieu Lipitor. 10. GERD: Continue protonix. NOTE: This report was transcribed using voice recognition software. Every effort was made to ensure accuracy; however, inadvertent computerized transcription errors may be present.   Electronically signed by BRENDA Pittman CNP on 9/19/2020 at 8:08 AM

## 2020-09-19 NOTE — PROGRESS NOTES
Addendum  I have personally participated in the history, exam, medical decision making with Donya Potter NP on the date of service, I have personally reviewed imaging and lab data as well as EKG and I agree with all of the pertinent clinical information unless otherwise noted. I have also reviewed and agree with the past medical, family, and social history unless otherwise noted. 9/18 Romana Tejada is a 67F w PMH arthritis, COPD w tobacco abuse, a fib on Eliquis, DM, HPL, HTN, obesity, SHALA, palate mass, and arthritis who was in the bathroom, got up and felt dizzy and weak and pt subsequently fell and hit her head with intermittent confusion. Was seen in ED 9/16 for vomiting; noted to have a UTI. Pt had been unable to keep antibiotics down. Admitted to nausea and emesis. No fevers, chills, neck pain, chest pain, palpitations, diaphoresis, abd pain, melena, hematochezia. Found to be hypovolemic w renal insufficiency. Additionally, it was noted that pt had a gastric sleeve done 8/14 with normal esophagram on 9/16.       Today, BP a bit soft at 72/36 though HR and RR controlled. Pulse ox is 97% on 3L w CXR clear. UA consistent with UTI and pt started on Rocephin. Cr was 1.1 on 9/16 which is baseline but 2.4 last evening w baseline roughly 1.1-1.7. BUN at 20 and GFR 20 w baseline 30-50. BNP up a bit at 953 but again CXR was clear. EKG showed a fib and no ischemic changes. CT head and cervical spine were unremarkable for acute pathology. 9/19 No real improvement in renal fn w IVF, will check renal US and consult nephro. BP remains low, will dc BB for now; pt asymptomatic. Had some diarrhea, stool sent for C diff. Physical Exam  BP (!) 82/54   Pulse 100   Temp 97.7 °F (36.5 °C) (Oral)   Resp 16   Ht 5' 5\" (1.651 m)   Wt (!) 457 lb 3.2 oz (207.4 kg)   SpO2 96%   BMI 76.08 kg/m²   Lungs are clear to auscultation bilaterally no crackles no wheezing. Heart normal S1-S2.   Abdomen soft nontender nondistended positive bowel sounds. Extremities no peripheral edema.     Assessment / Plan  #1 OLIVIA on CKD IIIa-IIIb              Cr 2.4 in ED w baseline ~1.1-1.7 per EMR              IVF - 3.5 L of IVF boluses and now on NS at 100 cc/hr                          Follow intake, output, daily weights to guard against overload                          UOP mediocre at 0.4-0.5 cc/kg/hr              Follow Cr with daily labs - 2.3 today    No real improvement - check renal US, consult nephro              Check UA - large leuk esterate / pos nitrite / many bacteria (see #3)              FENa is 0.33%, c/w prerenal azotemia              Not on diuretic at home              Heparin for DVT ppx     #2 Acute hypoxic respiratory failure in the setting of COPD w continued tobacco abuse and morbid obesity with SHALA              SpO2 87% on room air in ED; 97% on 3L now              CXR clear              BNP ~1000              Check CT chest (w/o contrast d/t OLIVIA) - pt too obese for CT              Check d-dimer - doubt VTE as pt on Eliquis - was <200              O2 as needed     #3 UTI vs bacteriuria              Does not meet septic criteria              Not symptomatic              UA large leuk esterate / pos nitrite / many bacteria              Urine cx sent from ED - NGTD              Currently on Rocephin IV and will de-escalate based on course / cultures     #4 Weakness / fall              Dizziness, fell, struck head              CT head / cervical spine no acute pathology              Likely r/t #1              Fall precautions              PT OT              ?SNF placement                #5 Atrial fibrillation on chronic anticoagulation              Telemetry monitoring              Continue home metoprolol for rate-control              Continue home Eliquis for anticoagulation          #6 NIDDM / \"pre-diabetes\"              Not on any home oral antihyperglycemics               this AM              Reg diet Check hemoglobin A1c to assess long-term glucose control - 5.6%              Further orders based on pt course and A1c     #7 HTN - continue home metoprolol     #8 HPL - continue home Lipitor     #9 GERD - continue home Protonix  Electronically signed by Ike Banuelos DO on 9/19/2020 at 1:56 PM

## 2020-09-20 LAB
ANION GAP SERPL CALCULATED.3IONS-SCNC: 5 MMOL/L (ref 7–16)
BUN BLDV-MCNC: 19 MG/DL (ref 8–23)
C DIFF TOXIN/ANTIGEN: NORMAL
CALCIUM SERPL-MCNC: 8.9 MG/DL (ref 8.6–10.2)
CHLORIDE BLD-SCNC: 112 MMOL/L (ref 98–107)
CO2: 24 MMOL/L (ref 22–29)
CREAT SERPL-MCNC: 1.5 MG/DL (ref 0.5–1)
GFR AFRICAN AMERICAN: 41
GFR NON-AFRICAN AMERICAN: 34 ML/MIN/1.73
GLUCOSE BLD-MCNC: 87 MG/DL (ref 74–99)
HCT VFR BLD CALC: 41.2 % (ref 34–48)
HEMOGLOBIN: 12.6 G/DL (ref 11.5–15.5)
MCH RBC QN AUTO: 29.2 PG (ref 26–35)
MCHC RBC AUTO-ENTMCNC: 30.6 % (ref 32–34.5)
MCV RBC AUTO: 95.6 FL (ref 80–99.9)
PDW BLD-RTO: 14.6 FL (ref 11.5–15)
PLATELET # BLD: 179 E9/L (ref 130–450)
PMV BLD AUTO: 10.5 FL (ref 7–12)
POTASSIUM SERPL-SCNC: 5.1 MMOL/L (ref 3.5–5)
RBC # BLD: 4.31 E12/L (ref 3.5–5.5)
SARS-COV-2: NOT DETECTED
SODIUM BLD-SCNC: 141 MMOL/L (ref 132–146)
SOURCE: NORMAL
URINE CULTURE, ROUTINE: NORMAL
WBC # BLD: 5.7 E9/L (ref 4.5–11.5)

## 2020-09-20 PROCEDURE — 80048 BASIC METABOLIC PNL TOTAL CA: CPT

## 2020-09-20 PROCEDURE — P9047 ALBUMIN (HUMAN), 25%, 50ML: HCPCS | Performed by: INTERNAL MEDICINE

## 2020-09-20 PROCEDURE — 6360000002 HC RX W HCPCS: Performed by: INTERNAL MEDICINE

## 2020-09-20 PROCEDURE — 2700000000 HC OXYGEN THERAPY PER DAY

## 2020-09-20 PROCEDURE — 2580000003 HC RX 258: Performed by: INTERNAL MEDICINE

## 2020-09-20 PROCEDURE — 85027 COMPLETE CBC AUTOMATED: CPT

## 2020-09-20 PROCEDURE — 94640 AIRWAY INHALATION TREATMENT: CPT

## 2020-09-20 PROCEDURE — 6370000000 HC RX 637 (ALT 250 FOR IP): Performed by: INTERNAL MEDICINE

## 2020-09-20 PROCEDURE — 2060000000 HC ICU INTERMEDIATE R&B

## 2020-09-20 PROCEDURE — 36415 COLL VENOUS BLD VENIPUNCTURE: CPT

## 2020-09-20 PROCEDURE — 99233 SBSQ HOSP IP/OBS HIGH 50: CPT | Performed by: INTERNAL MEDICINE

## 2020-09-20 RX ADMIN — ALBUMIN (HUMAN) 25 G: 0.25 INJECTION, SOLUTION INTRAVENOUS at 06:25

## 2020-09-20 RX ADMIN — APIXABAN 5 MG: 5 TABLET, FILM COATED ORAL at 21:24

## 2020-09-20 RX ADMIN — ARFORMOTEROL TARTRATE 15 MCG: 15 SOLUTION RESPIRATORY (INHALATION) at 09:22

## 2020-09-20 RX ADMIN — SODIUM CHLORIDE, PRESERVATIVE FREE 10 ML: 5 INJECTION INTRAVENOUS at 21:24

## 2020-09-20 RX ADMIN — APIXABAN 5 MG: 5 TABLET, FILM COATED ORAL at 08:53

## 2020-09-20 RX ADMIN — ASPIRIN 81 MG: 81 TABLET, COATED ORAL at 08:53

## 2020-09-20 RX ADMIN — BUDESONIDE 500 MCG: 0.5 SUSPENSION RESPIRATORY (INHALATION) at 09:22

## 2020-09-20 RX ADMIN — ACETAMINOPHEN 650 MG: 325 TABLET ORAL at 23:35

## 2020-09-20 RX ADMIN — ACETAMINOPHEN 650 MG: 325 TABLET ORAL at 15:50

## 2020-09-20 RX ADMIN — PANTOPRAZOLE SODIUM 40 MG: 40 TABLET, DELAYED RELEASE ORAL at 08:53

## 2020-09-20 RX ADMIN — DOCUSATE SODIUM 100 MG: 100 CAPSULE, LIQUID FILLED ORAL at 21:24

## 2020-09-20 RX ADMIN — BUDESONIDE 500 MCG: 0.5 SUSPENSION RESPIRATORY (INHALATION) at 20:34

## 2020-09-20 RX ADMIN — SODIUM CHLORIDE: 9 INJECTION, SOLUTION INTRAVENOUS at 04:14

## 2020-09-20 RX ADMIN — CETIRIZINE HYDROCHLORIDE 10 MG: 10 TABLET, FILM COATED ORAL at 08:53

## 2020-09-20 RX ADMIN — ARFORMOTEROL TARTRATE 15 MCG: 15 SOLUTION RESPIRATORY (INHALATION) at 20:34

## 2020-09-20 RX ADMIN — ATORVASTATIN CALCIUM 40 MG: 40 TABLET, FILM COATED ORAL at 08:53

## 2020-09-20 RX ADMIN — SODIUM CHLORIDE: 9 INJECTION, SOLUTION INTRAVENOUS at 14:40

## 2020-09-20 RX ADMIN — ACETAMINOPHEN 650 MG: 325 TABLET ORAL at 07:33

## 2020-09-20 RX ADMIN — CEFTRIAXONE 1 G: 1 INJECTION, POWDER, FOR SOLUTION INTRAMUSCULAR; INTRAVENOUS at 08:53

## 2020-09-20 ASSESSMENT — PAIN DESCRIPTION - DESCRIPTORS: DESCRIPTORS: ACHING

## 2020-09-20 ASSESSMENT — PAIN DESCRIPTION - PAIN TYPE: TYPE: ACUTE PAIN

## 2020-09-20 ASSESSMENT — PAIN SCALES - GENERAL
PAINLEVEL_OUTOF10: 8
PAINLEVEL_OUTOF10: 6
PAINLEVEL_OUTOF10: 6
PAINLEVEL_OUTOF10: 0
PAINLEVEL_OUTOF10: 8

## 2020-09-20 ASSESSMENT — PAIN DESCRIPTION - LOCATION: LOCATION: HEAD

## 2020-09-20 ASSESSMENT — PAIN DESCRIPTION - FREQUENCY: FREQUENCY: INTERMITTENT

## 2020-09-20 NOTE — PROGRESS NOTES
n-acetaminophen]; Influenza vaccines; Lamictal [lamotrigine];  Other; Pentazocine lactate; and Talwin [pentazocine]    Current Medications:    cefTRIAXone (ROCEPHIN) 1 g in sterile water 10 mL IV syringe, Q24H  0.9 % sodium chloride infusion, Continuous  albuterol (PROVENTIL) nebulizer solution 2.5 mg, Daily PRN  apixaban (ELIQUIS) tablet 5 mg, BID  aspirin EC tablet 81 mg, Daily  atorvastatin (LIPITOR) tablet 40 mg, Daily  cetirizine (ZYRTEC) tablet 10 mg, Daily  docusate sodium (COLACE) capsule 100 mg, BID  pantoprazole (PROTONIX) tablet 40 mg, Daily  sodium chloride flush 0.9 % injection 10 mL, 2 times per day  sodium chloride flush 0.9 % injection 10 mL, PRN  acetaminophen (TYLENOL) tablet 650 mg, Q6H PRN    Or  acetaminophen (TYLENOL) suppository 650 mg, Q6H PRN  polyethylene glycol (GLYCOLAX) packet 17 g, Daily PRN  promethazine (PHENERGAN) tablet 12.5 mg, Q6H PRN    Or  ondansetron (ZOFRAN) injection 4 mg, Q6H PRN  budesonide (PULMICORT) nebulizer suspension 500 mcg, BID  Arformoterol Tartrate (BROVANA) nebulizer solution 15 mcg, BID  miconazole nitrate 2 % ointment, BID        Review of Systems:   10 point review of system done at this time is negative except for the things mentioned in the HPI    Physical exam:   Constitutional:    Vitals: BP (!) 101/57   Pulse 81   Temp 97.8 °F (36.6 °C) (Oral)   Resp 16   Ht 5' 5\" (1.651 m)   Wt (!) 459 lb 12.8 oz (208.6 kg)   SpO2 96%   BMI 76.51 kg/m²      Morbidly obese elderly  female lying in bed, alert awake and oriented x3, appears in no acute distress, FiO2 requirement is at 4 L via nasal cannula  Skin: no rash, turgor wnl  Heent:  eomi, mmm  Neck: no bruits or jvd noted  Cardiovascular:  S1, S2 without m/r/g  Respiratory: CTA B without w/r/r, distant breath sounds, decreased in bases  Abdomen:  +bs, soft, nt, distended and obese  Ext: Trace bilateral lower extremity edema  Psychiatric: mood and affect appropriate  Musculoskeletal:  Rom, muscular strength intact    Data:   Labs:  CBC:   Lab Results   Component Value Date    WBC 5.7 09/20/2020    RBC 4.31 09/20/2020    HGB 12.6 09/20/2020    HCT 41.2 09/20/2020    MCV 95.6 09/20/2020    MCH 29.2 09/20/2020    MCHC 30.6 09/20/2020    RDW 14.6 09/20/2020     09/20/2020    MPV 10.5 09/20/2020     CMP:    Lab Results   Component Value Date     09/20/2020    K 5.1 09/20/2020    K 4.0 10/31/2018     09/20/2020    CO2 24 09/20/2020    BUN 19 09/20/2020    CREATININE 1.5 09/20/2020    GFRAA 41 09/20/2020    LABGLOM 34 09/20/2020    GLUCOSE 87 09/20/2020    PROT 5.9 09/18/2020    LABALBU 3.0 09/18/2020    CALCIUM 8.9 09/20/2020    BILITOT <0.2 09/18/2020    ALKPHOS 62 09/18/2020    AST 15 09/18/2020    ALT 15 09/18/2020     BMP:    Lab Results   Component Value Date     09/20/2020    K 5.1 09/20/2020    K 4.0 10/31/2018     09/20/2020    CO2 24 09/20/2020    BUN 19 09/20/2020    LABALBU 3.0 09/18/2020    CREATININE 1.5 09/20/2020    CALCIUM 8.9 09/20/2020    GFRAA 41 09/20/2020    LABGLOM 34 09/20/2020    GLUCOSE 87 09/20/2020     Calcium:    Lab Results   Component Value Date    CALCIUM 8.9 09/20/2020     Phosphorus:    Lab Results   Component Value Date    PHOS 3.5 11/29/2016     Uric Acid:  No results found for: URICACID  U/A:    Lab Results   Component Value Date    NITRU Negative 09/18/2020    COLORU Yellow 09/18/2020    PHUR 5.0 09/18/2020    WBCUA >20 09/18/2020    RBCUA 2-5 09/18/2020    BACTERIA FEW 09/18/2020    CLARITYU SL CLOUDY 09/18/2020    SPECGRAV >=1.030 09/18/2020    LEUKOCYTESUR MODERATE 09/18/2020    UROBILINOGEN 0.2 09/18/2020    BILIRUBINUR SMALL 09/18/2020    BLOODU SMALL 09/18/2020    GLUCOSEU Negative 09/18/2020    KETUA 15 09/18/2020    AMORPHOUS FEW 11/23/2016      Results for Felicia Jones (MRN [id number]) as of 9/20/2020 20:02   Ref.  Range 9/19/2020 18:50   Total CK Latest Ref Range: 20 - 180 U/L 57     Imaging:  Ct Head Wo Contrast    Result Date: 2020  Patient MRN:  [id number] : 1950 Age: 79 years Gender: Female Order Date:  2020 11:35 PM TECHNIQUE/NUMBER OF IMAGES/COMPARISON/CLINICAL HISTORY: CT brain Axial images were obtained sagittal and coronal reconstructions Comparison  24-year-old female patient history of trauma injury pain. FINDINGS: There are unremarkable appearance for peripheral CSF space and ventricular system. There is no focal mass defect or midline shift. There is no evidence for a sizable area of an acute or recent insult in progression to the brain parenchyma. Midline structures have unremarkable appearance. There is no indication for an acute intracranial hemorrhagic event. Images with bone window settings demonstrate no significant findings. No indication for an acute intracranial process. Ct Cervical Spine Wo Contrast    Result Date: 2020  Clinical indications: Pain status post trauma. COMPARISON: X-rays of the cervical spine 2019 Exposure control: This examination and all examinations utilizing ionizing radiation at this facility done so according to the ALARA (as low as reasonably achievable) principal for radiation dose reduction. TECHNIQUE: Axial, sagittal, and coronal computed tomography of the cervical spine was performed without contrast. FINDINGS: These images reveal no evidence for acute displaced cortical disruption or spondylolisthesis. There is mild cervical kyphosis. There is diffuse loss of disc height, degenerative spondylosis, uncovertebral hypertrophy and facet arthropathy. These degenerative osseous changes result in multilevel central and foraminal stenosis. There are calcifications within the regional arteries. Otherwise regional soft tissue and osseous structures are unremarkable. No acute fracture or spondylolisthesis is identified on CT of cervical spine. Diffuse degenerative disc and facet disease result in multilevel central and foraminal stenosis. Cervical kyphosis which can be due to muscle spasm or positional. Atherosclerotic vascular disease. Xr Chest Portable    Result Date: 2020  Clinical indications: Cough. Shortness of breath. TECHNIQUE: Single frontal projection of the chest (1 view). COMPARISON: 2019. FINDINGS: The heart is enlarged. There is calcification within thoracic aorta. Acromioclavicular arthropathy. The heart, lungs, mediastinum and regional skeleton are otherwise unremarkable. No evidence for acute cardiopulmonary process. Us Retroperitoneal Complete    Result Date: 2020  Patient MRN: [id number] : 1950 Age:  79 years Gender: Female Order Date: 2020 1:21 PM Exam: US RETROPERITONEAL COMPLETE Number of Images: 36 views Indication:   Possible obstruction and increasing creatinine Possible obstruction and increasing creatinine Comparison: None. Real-time sonogram study of the right and left kidneys shows normal size and shaped kidneys. The right kidney measures 10.0 x 5.5 x 5.8 cm , and the left kidney measures 11.0 x 4.9 x 4.8 cm . Both kidneys have normal cortical thickness. There is no apparent hydronephrosis or renal mass. The retroperitoneal area appears unremarkable. No evidence of aneurysmal dilatation of the aorta is seen. The urinary bladder is not visualized. NORMAL RENAL SONOGRAM.       Assessment  1. Acute kidney injury, secondary to severe prerenal azotemia from intractable nausea and vomiting, severe hypotension, continued use of lisinopril in this setting. With 2 falls at home will also rule out underlying rhabdomyolysis/pigment induced nephropathy  2. Stage III chronic kidney disease with a baseline creatinine of 1.3-1.7 secondary to hypertension and diabetes  3. Urinary tract infection on IV ceftriaxone    Plan  1.  Urine electrolytes reviewed, calculated fractional excretion of sodium is 0.3% strongly consistent with prerenal injury, renal ultrasound is unremarkable for an obstruction. 2. Renal function improved and her creatinine is back to baseline with IV NS and Albumin  3. Stop IVF  4.  No need to restart Amlodipine, Lisinopril and Metformin at discharge    Thank you very much for the consult Dr. Daryle Lively I will follow her closely along with                Electronically signed by Naldo Sullivan MD on 9/20/2020 at 1:29 PM

## 2020-09-20 NOTE — PROGRESS NOTES
systems has been reviewed and is otherwise negative except as listed in the HPI    Objective  Physical Exam  Vitals: BP (!) 101/57   Pulse 81   Temp 97.8 °F (36.6 °C) (Oral)   Resp 16   Ht 5' 5\" (1.651 m)   Wt (!) 459 lb 12.8 oz (208.6 kg)   SpO2 96%   BMI 76.51 kg/m²   General Appearance: alert and oriented to person, place and time and in no acute distress  Skin: warm and dry, no rashes or lesions noted  Head: normocephalic and atraumatic  Eyes: pupils equal, round, and reactive to light, extraocular eye movements intact, conjunctivae normal  Neck: neck supple and non tender without mass   Pulmonary/Chest: diminished to auscultation bilaterally- no wheezes, rales or rhonchi, normal air movement, no respiratory distress on 3L NC  Cardiovascular: normal rate, normal S1 and S2 and no murmurs noted  Abdomen: obese, soft, non-tender, non-distended, normal bowel sounds  Extremities: no cyanosis, no clubbing and +2 edema bilateral lower extremities  Neurologic: awake, alert and speech normal    Labs  Recent Labs     09/18/20  0950 09/19/20  1005 09/20/20  0633    137 141   K 3.9 4.4 5.1*   CO2 23 24 24   BUN 19 21 19   CREATININE 2.3* 2.3* 1.5*   GLUCOSE 91 88 87   CALCIUM 8.3* 8.9 8.9   WBC 8.0 7.9 5.7   RBC 4.30 4.57 4.31   HGB 12.4 13.2 12.6   HCT 42.3 44.1 41.2    211 179     Radiology  Ct Head Wo Contrast  Result Date: 9/17/2020  No indication for an acute intracranial process. Ct Cervical Spine Wo Contrast  Result Date: 9/17/2020  No acute fracture or spondylolisthesis is identified on CT of cervical spine. Diffuse degenerative disc and facet disease result in multilevel central and foraminal stenosis. Cervical kyphosis which can be due to muscle spasm or positional. Atherosclerotic vascular disease. Xr Chest Portable  Result Date: 9/17/2020  No evidence for acute cardiopulmonary process.      Us Retroperitoneal Complete  Result Date: 9/19/2020  NORMAL RENAL SONOGRAM.     Assessment / anticoagulation          #6 NIDDM / \"pre-diabetes\"              UPD on any home oral antihyperglycemics              BG 87 this AM              Reg diet              Check hemoglobin A1c to assess long-term glucose control - 5.6%              Further orders based on pt course and A1c     #7 HTN - continue home metoprolol     #8 HPL - continue home Lipitor     #9 GERD - continue home Protonix    Code status  Full  DVT prophylaxis Eliquis  Disposition  To be determined - PT OT ordered    Electronically signed by Bronwyn Alvarez DO on 9/20/2020 at 12:46 PM

## 2020-09-20 NOTE — PLAN OF CARE
Problem: Pain:  Goal: Pain level will decrease  Description: Pain level will decrease  9/20/2020 0030 by Calvert Litten, RN  Outcome: Met This Shift     Problem: Pain:  Goal: Control of acute pain  Description: Control of acute pain  9/20/2020 0030 by Calvert Litten, RN  Outcome: Met This Shift     Problem: Pain:  Goal: Control of chronic pain  Description: Control of chronic pain  9/20/2020 0030 by Calvert Litten, RN  Outcome: Met This Shift

## 2020-09-20 NOTE — PROGRESS NOTES
Patient is refusing wound care orders for abdominal folds and under breasts, patients own powder applied to cleansed skin folds.

## 2020-09-21 LAB
ANION GAP SERPL CALCULATED.3IONS-SCNC: 4 MMOL/L (ref 7–16)
BUN BLDV-MCNC: 14 MG/DL (ref 8–23)
CALCIUM SERPL-MCNC: 9 MG/DL (ref 8.6–10.2)
CHLORIDE BLD-SCNC: 112 MMOL/L (ref 98–107)
CO2: 25 MMOL/L (ref 22–29)
CREAT SERPL-MCNC: 1.2 MG/DL (ref 0.5–1)
GFR AFRICAN AMERICAN: 54
GFR NON-AFRICAN AMERICAN: 44 ML/MIN/1.73
GLUCOSE BLD-MCNC: 100 MG/DL (ref 74–99)
HCT VFR BLD CALC: 43 % (ref 34–48)
HEMOGLOBIN: 13.2 G/DL (ref 11.5–15.5)
MCH RBC QN AUTO: 28.8 PG (ref 26–35)
MCHC RBC AUTO-ENTMCNC: 30.7 % (ref 32–34.5)
MCV RBC AUTO: 93.7 FL (ref 80–99.9)
PDW BLD-RTO: 14.6 FL (ref 11.5–15)
PLATELET # BLD: 177 E9/L (ref 130–450)
PMV BLD AUTO: 10.3 FL (ref 7–12)
POTASSIUM SERPL-SCNC: 4.3 MMOL/L (ref 3.5–5)
RBC # BLD: 4.59 E12/L (ref 3.5–5.5)
SODIUM BLD-SCNC: 141 MMOL/L (ref 132–146)
WBC # BLD: 5.3 E9/L (ref 4.5–11.5)

## 2020-09-21 PROCEDURE — 97161 PT EVAL LOW COMPLEX 20 MIN: CPT

## 2020-09-21 PROCEDURE — 99232 SBSQ HOSP IP/OBS MODERATE 35: CPT | Performed by: INTERNAL MEDICINE

## 2020-09-21 PROCEDURE — 2580000003 HC RX 258: Performed by: INTERNAL MEDICINE

## 2020-09-21 PROCEDURE — 6360000002 HC RX W HCPCS: Performed by: INTERNAL MEDICINE

## 2020-09-21 PROCEDURE — 2060000000 HC ICU INTERMEDIATE R&B

## 2020-09-21 PROCEDURE — 97165 OT EVAL LOW COMPLEX 30 MIN: CPT

## 2020-09-21 PROCEDURE — APPSS30 APP SPLIT SHARED TIME 16-30 MINUTES: Performed by: NURSE PRACTITIONER

## 2020-09-21 PROCEDURE — 36415 COLL VENOUS BLD VENIPUNCTURE: CPT

## 2020-09-21 PROCEDURE — 6370000000 HC RX 637 (ALT 250 FOR IP): Performed by: INTERNAL MEDICINE

## 2020-09-21 PROCEDURE — 85027 COMPLETE CBC AUTOMATED: CPT

## 2020-09-21 PROCEDURE — 94640 AIRWAY INHALATION TREATMENT: CPT

## 2020-09-21 PROCEDURE — 80048 BASIC METABOLIC PNL TOTAL CA: CPT

## 2020-09-21 PROCEDURE — 2700000000 HC OXYGEN THERAPY PER DAY

## 2020-09-21 RX ADMIN — ASPIRIN 81 MG: 81 TABLET, COATED ORAL at 07:51

## 2020-09-21 RX ADMIN — ATORVASTATIN CALCIUM 40 MG: 40 TABLET, FILM COATED ORAL at 07:51

## 2020-09-21 RX ADMIN — ACETAMINOPHEN 650 MG: 325 TABLET ORAL at 18:17

## 2020-09-21 RX ADMIN — APIXABAN 5 MG: 5 TABLET, FILM COATED ORAL at 21:34

## 2020-09-21 RX ADMIN — CETIRIZINE HYDROCHLORIDE 10 MG: 10 TABLET, FILM COATED ORAL at 07:52

## 2020-09-21 RX ADMIN — APIXABAN 5 MG: 5 TABLET, FILM COATED ORAL at 07:51

## 2020-09-21 RX ADMIN — PANTOPRAZOLE SODIUM 40 MG: 40 TABLET, DELAYED RELEASE ORAL at 07:51

## 2020-09-21 RX ADMIN — BUDESONIDE 500 MCG: 0.5 SUSPENSION RESPIRATORY (INHALATION) at 08:46

## 2020-09-21 RX ADMIN — ARFORMOTEROL TARTRATE 15 MCG: 15 SOLUTION RESPIRATORY (INHALATION) at 20:11

## 2020-09-21 RX ADMIN — BUDESONIDE 500 MCG: 0.5 SUSPENSION RESPIRATORY (INHALATION) at 20:11

## 2020-09-21 RX ADMIN — SODIUM CHLORIDE, PRESERVATIVE FREE 10 ML: 5 INJECTION INTRAVENOUS at 21:43

## 2020-09-21 RX ADMIN — ARFORMOTEROL TARTRATE 15 MCG: 15 SOLUTION RESPIRATORY (INHALATION) at 08:46

## 2020-09-21 RX ADMIN — CEFTRIAXONE 1 G: 1 INJECTION, POWDER, FOR SOLUTION INTRAMUSCULAR; INTRAVENOUS at 07:50

## 2020-09-21 RX ADMIN — SODIUM CHLORIDE, PRESERVATIVE FREE 10 ML: 5 INJECTION INTRAVENOUS at 07:52

## 2020-09-21 ASSESSMENT — PAIN SCALES - GENERAL
PAINLEVEL_OUTOF10: 0
PAINLEVEL_OUTOF10: 6
PAINLEVEL_OUTOF10: 0
PAINLEVEL_OUTOF10: 0

## 2020-09-21 NOTE — PROGRESS NOTES
Addendum  I have personally participated in the history, exam, medical decision making with Radha Grigsby NP on the date of service, I have personally reviewed imaging and lab data as well as EKG and I agree with all of the pertinent clinical information unless otherwise noted. I have also reviewed and agree with the past medical, family, and social history unless otherwise noted. 9/18 Olimpia is a 70F w PMH arthritis, COPD w tobacco abuse, a fib on Eliquis, DM, HPL, HTN, obesity, SHALA, palate mass, and arthritis who was in the bathroom, got up and felt dizzy and weak and pt subsequently fell and hit her head with intermittent confusion.  Was seen in ED 9/16 for vomiting; noted to have a UTI.  Pt had been unable to keep antibiotics down.  Admitted to nausea and emesis.  No fevers, chills, neck pain, chest pain, palpitations, diaphoresis, abd pain, melena, hematochezia.  Found to be hypovolemic w renal insufficiency.  Additionally, it was noted that pt had a gastric sleeve done 8/14 with normal esophagram on 9/16.       Today, BP a bit soft at 72/36 though HR and RR controlled.  Pulse ox is 97% on 3L w CXR clear. Ardie Carrow consistent with UTI and pt started on Rocephin.  Cr was 1.1 on 9/16 which is baseline but 2.4 last evening w baseline roughly 1.1-1.7.  BUN at 20 and GFR 20 w baseline 30-50.  BNP up a bit at 953 but again CXR was clear.  EKG showed a fib and no ischemic changes.  CT head and cervical spine were unremarkable for acute pathology.     9/19 No real improvement in renal fn w IVF, will check renal US and consult nephro.  BP remains low, will dc BB for now; pt asymptomatic.  Had some diarrhea, stool sent for C diff.      9/20 Seen sitting up in bed.  Feeling generally well.  No diarrhea today, stool negative for C diff.  Pt not happy about her oxygen because it is causing sores in her nose and had O2 off on my exam with no demonstrable shortness of breath.  Says her shakes are much better today.   Some pain around the occiput from her fall.  Curious if she will be going home or [facility] for rehab as she had been falling at home.     9/21 Back on room air today. Renal fn essentially back to baseline today so from that standpoint does not likely require further hospitalization though w pt's falls prior to admission she fears that she would be unsafe at home and will await PT and OT evals for hopeful placement at [facility] [employer].     Physical Exam  BP (!) 119/59   Pulse 100   Temp 98.5 °F (36.9 °C) (Oral)   Resp 16   Ht 5' 5\" (1.651 m)   Wt (!) 470 lb 12.8 oz (213.6 kg)   SpO2 94%   BMI 78.35 kg/m²   General Appearance: alert and oriented to person, place and time and in no acute distress  Skin: warm and dry, no rashes or lesions noted  Head: normocephalic and atraumatic  Eyes: pupils equal, round, and reactive to light, extraocular eye movements intact, conjunctivae normal  Neck: neck supple and non tender without mass   Pulmonary/Chest: diminished to auscultation bilaterally- no wheezes, rales or rhonchi, normal air movement, no respiratory distress on room air  Cardiovascular: normal rate, normal S1 and S2 and no murmurs noted  Abdomen: obese, soft, non-tender, non-distended, normal bowel sounds  Extremities: no cyanosis, no clubbing and +2 edema bilateral lower extremities  Neurologic: awake, alert and speech normal    Assessment / Plan  #1 OLIVIA on CKD IIIa-IIIb              Cr 2.4 in ED w baseline ~1.1-1.7 per EMR              IVF - 3.5 L of IVF boluses and now on NS at 100 cc/hr                          Follow intake, output, daily weights to guard against overload                          UOP mediocre at 0.4-0.5 cc/kg/hr              Follow Cr with daily labs - 2.4, 2.3, 2.3, 1.5, 1.2                          No real improvement - check renal US, consult nephro                          Renal US unremarkable                          Nephro - inc IVF to 125 and supplement albumin     Do not restart amlodipine, lisinopril, PT OT ordered - hopefully [facility]    Electronically signed by Dorota Vaz DO on 9/21/2020 at 12:59 PM

## 2020-09-21 NOTE — CARE COORDINATION
Social Work/Discharge Planning:  Called cheryl Jimenez from [facility] and confirmed facility can accept patient pending therapy evaluation. This worker requested for facility to start pre-cert once therapy noted. COVID negative 9/18. Met with patient and provided her with an update. Patient states therapy was in but she requested for therapy to return later. Will continue to follow and wait for therapy notes. Electronically signed by NADIA Quigley on 9/21/2020 at 11:15 AM    Addendum:  PT/OT noted. Notified cheryl Jimenez of therapy notes and requested for pre-cert to be started. Will continue to follow and wait for pre-cert.   Electronically signed by NADIA Quigley on 9/21/2020 at 2:51 PM

## 2020-09-21 NOTE — PROGRESS NOTES
Nephrology Consult Note    Patient's Name: Shawn Nelson  9:02 AM  9/21/2020    Nephrologist: Dr. Mike Cazares MD    Reason for Consult: Renal is consulted for acute kidney injury  Requesting Physician:  Dr. Yaakov Dickinson  Chief Complaint: Intractable nausea and vomiting, weakness and fall    History Obtained From: Patient and EMR    Sub: Patient seen and examined bedside  Reports no new complaints today      Past Medical History:   Diagnosis Date    Arthritis     Cerebral artery occlusion with cerebral infarction [facility]) 1998?    tia     Cigarette nicotine dependence without complication 3/97/0312    COPD [facility]ch[facility] obstructive [facility] [address]     Depression 10/2014    \"doing ok\". anxiety    Diabetes mellitus ([facility] [address]     prediabetic    GERD (gastroesophageal reflux disease)     Hyperlipidemia     Hypertension     Hypothyroidism     Obese 10/2014    states weight aprox. 400 lbs.  SHALA (obstructive sleep apnea)     no treatment    Palate mass        Past Surgical History:   Procedure Laterality Date    APPENDECTOMY      CARPAL TUNNEL RELEASE Bilateral     17 years ago    COLONOSCOPY      ENDOSCOPY, COLON, DIAGNOSTIC      FOOT SURGERY Right 2009    FOOT SURGERY  2014    reconstructive for hammer toes all four, not big toe left foot    HAMMER TOE SURGERY Left 03/06/2017    HYSTERECTOMY      JOINT REPLACEMENT  bilat knee    OTHER SURGICAL HISTORY Left 11/5/2014    hammer toe correction 2-4left foot and hardware;left calcaneous x2,flexor digital longis repair    TONSILLECTOMY         Family History   Problem Relation Age of Onset    Heart Disease Mother     Cancer Father     Cancer Sister     Cancer Brother     Cancer Son         reports that she has quit smoking. Her smoking use included cigarettes and cigars. She has never used smokeless tobacco. She reports that she does not drink alcohol or use drugs. Allergies:  Food; Iodine; Darvocet [propoxyphene n-acetaminophen];  Influenza vaccines; disease. Xr Chest Portable    Result Date: 2020  Clinical indications: Cough. Shortness of breath. TECHNIQUE: Single frontal projection of the chest (1 view). COMPARISON: 2019. FINDINGS: The heart is enlarged. There is calcification within thoracic aorta. Acromioclavicular arthropathy. The heart, lungs, mediastinum and regional skeleton are otherwise unremarkable. No evidence for acute cardiopulmonary process. Us Retroperitoneal Complete    Result Date: 2020  Patient MRN: [id number] : 1950 Age:  79 years Gender: Female Order Date: 2020 1:21 PM Exam: US RETROPERITONEAL COMPLETE Number of Images: 36 views Indication:   Possible obstruction and increasing creatinine Possible obstruction and increasing creatinine Comparison: None. Real-time sonogram study of the right and left kidneys shows normal size and shaped kidneys. The right kidney measures 10.0 x 5.5 x 5.8 cm , and the left kidney measures 11.0 x 4.9 x 4.8 cm . Both kidneys have normal cortical thickness. There is no apparent hydronephrosis or renal mass. The retroperitoneal area appears unremarkable. No evidence of aneurysmal dilatation of the aorta is seen. The urinary bladder is not visualized. NORMAL RENAL SONOGRAM.       Assessment  1. Acute kidney injury, secondary to severe prerenal azotemia from intractable nausea and vomiting, severe hypotension, continued use of lisinopril in this setting. With 2 falls at home will also rule out underlying rhabdomyolysis/pigment induced nephropathy  2. Stage III chronic kidney disease with a baseline creatinine of 1.3-1.7 secondary to hypertension and diabetes  3. Urinary tract infection on IV ceftriaxone    Plan  1. Urine electrolytes reviewed, calculated fractional excretion of sodium is 0.3% strongly consistent with prerenal injury, renal ultrasound is unremarkable for an obstruction.    2. Renal function improved and her creatinine is back to baseline with IV NS and Albumin  3. Off of IV fluids now, no need to restart amlodipine, lisinopril and metformin at discharge  4.  Remove Peterson catheter  Follow-up in our office in 4 weeks    Thank you very much for the consult Dr. Ivelisse Koenig I will follow her closely along with                Electronically signed by Jasmyne Pierre MD on 9/21/2020 at 9:02 AM

## 2020-09-21 NOTE — PROGRESS NOTES
GENERAL SURGERY  DAILY PROGRESS NOTE  9/21/2020    Chief Complaint   Patient presents with    Fall     fell this AM.  seen in ED. Marian Maxte again this evening hitting head    Back Pain     from fall this evening    Head Injury     takes Eliquis       Subjective:  Pt states she has some abdominal pain around her sides which is the same since admission. Denies any nausea, vomiting. Reports some diarrhea. Objective:  BP (!) 120/59   Pulse 89   Temp 97.9 °F (36.6 °C) (Oral)   Resp 18   Ht 5' 5\" (1.651 m)   Wt (!) 470 lb 12.8 oz (213.6 kg)   SpO2 92%   BMI 78.35 kg/m²     GENERAL:  Laying in bed, awake, alert, cooperative, no apparent distress  HEAD: Normocephalic, atraumatic  EYES: No sclera icterus, pupils equal  LUNGS:  No increased work of breathing  CARDIOVASCULAR:  RR  ABDOMEN:  Soft, non-tender, non-distended  EXTREMITIES: No edema or swelling  SKIN: Warm and dry    Assessment/Plan:  79 y.o. female with nausea and vomiting which has been now resolved.     - Okay for bariatric full liquid diet  - No plan for scopes  - F/U stool studies  - Continue antibiotics  - Continue PPI  - Pain control PRN  - Antiemetic PRN    Electronically signed by Josias Guallpa MD on 9/21/2020 at 7:22 AM

## 2020-09-21 NOTE — PROGRESS NOTES
Physical Therapy    Facility/Department: [address] INTERMEDIATE 1  Initial Assessment    NAME: Ana Gardiner  : 1950  MRN: [id number]    Date of Service: 2020      Patient Diagnosis(es): The primary encounter diagnosis was Closed head injury, initial encounter. Diagnoses of Cervical strain, acute, initial encounter, Hypoxia, Acute renal failure, unspecified acute renal failure type ([address] Hypovolemia, and Acute cystitis with hematuria were also pertinent to this visit. has a past medical history of Arthritis, Cerebral artery occlusion with cerebral infarction ([address] Cigarette nicotine dependence without complication, COPD (chronic obstructive pulmonary disease) ([address] Depression, Diabetes mellitus ([address] GERD (gastroesophageal reflux disease), Hyperlipidemia, Hypertension, Hypothyroidism, Obese, SHALA (obstructive sleep apnea), and Palate mass. has a past surgical history that includes Hysterectomy; Foot surgery (Right, ); Appendectomy; Tonsillectomy; Colonoscopy; Endoscopy, colon, diagnostic; Carpal tunnel release (Bilateral); other surgical history (Left, 2014); Foot surgery (); Hammer toe surgery (Left, 2017); and joint replacement (bilat knee). Referring Provider:  Melecio Bynum MD       Evaluating Therapist: Kelly LEBLANC      Room #:435  DIAGNOSIS: Acute renal failure  Additional Pertinent History:CVA  PRECAUTIONS: falls    Social:  Pt lives alone in a 1 floor plan has lift to enter home. Ambulates short distance with ww, mostly uses power chair. Has assist from home health aide and daughter. Initial Evaluation  Date: 20 Treatment      Short Term/ Long Term   Goals   Was pt agreeable to Eval/treatment? yes     Does pt have pain?  L foot     Bed Mobility  Rolling: min assist  Supine to sit: min assist  Sit to supine: mod assist  Scooting: min assist  SBA   Transfers Sit to stand: min assist  Stand to sit: min assist  Stand pivot: NT  SBA   Ambulation    3 feet with bariatric ww with min assist sidestepping along bed  25 feet with ww with SBA   Stair Negotiation  Ascended and descended  NT   N/A   LE strength     3/5    3+/5   balance      fair     AM-PAC Raw score               15/24         Pt is alert and Oriented   LE ROM: WFL  Sensation: intact  Edema: B LE's  Endurance: fair-     ASSESSMENT  Pt displays functional ability as noted in the objective portion of this evaluation. Patient education  Pt educated on PT objectives    Patient response to education:   Pt verbalized understanding Pt demonstrated skill Pt requires further education in this area   yes         ASSESSMENT:    Comments:  Pt c/o dizziness upon first sitting up. Standing limited by L foot pain (chronic)      Patient and or family understand(s) diagnosis, prognosis, and plan of care. yes    PLAN:    PLAN OF CARE:    Current Treatment Recommendations     [x] Strengthening     [] ROM   [x] Balance Training   [x] Endurance Training   [x] Transfer Training   [x] Gait Training   [] Stair Training   [] Positioning   [x] Safety and Education Training   [x] Patient/Caregiver Education   [] HEP  [] Other     Frequency of treatments: 2-5x/week x 5-7. days    Time in  115  Time out  130        Evaluation Time includes thorough review of current medical information, gathering information on past medical history/social history and prior level of function, completion of standardized testing/informal observation of tasks, assessment of data and education on plan of care and goals.     CPT codes:  [x] Low Complexity PT evaluation 37633  [] Moderate Complexity PT evaluation 61336  [] High Complexity PT evaluation 72816  [] PT Re-evaluation 50689  [] Gait training 64900 minutes  [] Manual therapy 26847 minutes  [] Therapeutic activities 67478 minutes  [] Therapeutic exercises 28827 minutes  [] Neuromuscular reeducation 91540 minutes     [facility] PSYCHIATRY PT [id number]

## 2020-09-21 NOTE — PLAN OF CARE
Problem: Skin Integrity:  Goal: Will show no infection signs and symptoms  Description: Will show no infection signs and symptoms  9/21/2020 1445 by Israeli Press, RN  Outcome: Met This Shift     Problem: Skin Integrity:  Goal: Absence of new skin breakdown  Description: Absence of new skin breakdown  9/21/2020 1445 by Israeli Press, RN  Outcome: Met This Shift     Problem: Falls - Risk of:  Goal: Will remain free from falls  Description: Will remain free from falls  Outcome: Met This Shift     Problem: Falls - Risk of:  Goal: Absence of physical injury  Description: Absence of physical injury  Outcome: Met This Shift

## 2020-09-21 NOTE — PROGRESS NOTES
Occupational Therapy  OCCUPATIONAL THERAPY INITIAL EVALUATION      Date:2020  Patient Name: Gifty Thayer  MRN: [id number]  : 1950  Room: [address] [place] [address]    Referring Provider: Juan Nichols MD   Evaluating OT: Andria Beckwith Carmen Gurpreet - [id number]    AM-PAC Daily Activity Raw Score:       Recommended Adaptive Equipment: Continue to assess. Diagnosis: Acute renal failure (ARF) (HCC) [N17.9]    Pertinent Medical History: recent sleeve gastrectomy (in [place]), obesity, COPD, HTN, arthritis, TIA, DM      Precautions: falls, skin integrity    Home Living: Patient reported that she lives alone in a one-floor home (stair glide to enter); patient's bedroom and bathroom are on the main living level. Bathroom Setup: tub shower (with extended tub bench, grab bars, and handheld shower head) and BSC frame over standard toilet  Equipment Owned: BSC, power wheelchair, extended tub bench    Prior Level of Function (PLOF): Per patient, she was independent with most ADLs, needed assistance with most IADLs, and was independent with functional transfers prior to this hospitalization. Patient reported that she has assistance from an aide and her daughter consistently; patient reported that her daughter assists her with showering. Pain Level: Patient reported experiencing pain in her L LE, but did not rate/describe her pain. Cognition: Patient alert and oriented grossly; patient reported experiencing impaired recall at times. Fair+ command follow demonstrated. Patient pleasant and cooperative. Memory: Fair   Sequencing: Fair   Problem Solving: Fair   Judgement/Safety: Fair    Impulsivity demonstrated. Functional Assessment:   Initial Eval Status  Date: 2020 Treatment Status  Date:  Short Term Goals   Feeding Independent     Grooming Min A  Setup  (seated/standing)   UB Dressing Min A  SBA   LB Dressing Max A to don socks.   Min A - with use of AE, as needed/appropriate   Bathing Max A  Min A - with use of AE/DME, as needed/appropriate   Toileting Max A  Min A   Bed Mobility  Supine-to-Sit: Min A  Sit-to-Supine: Mod A  Assistance with B LEs needed with bed mobility. Patient demonstrated reliance upon overhead trapeze and/or bed rails for all bed mobility. Functional Transfers Sit-to-Stand: Min A  from EOB (elevated surface)  Cues given to maximize safety. Supervision   Functional Mobility Min A   (with walker) for few steps to [facility]. Supervision with functional mobility (with device, as needed/appropriate) in order to maximize independence with ADLs/IADLs and other functional tasks. Balance Sitting: Good-  (at EOB)  Standing: Fair  (with walker)  Fair+ dynamic standing balance during completion of ADLs/IADLs and other functional tasks. Activity Tolerance Limited  Patient will demonstrate Good understanding and consistent implementation of energy conservation techniques and work simplification techniques into ADL routines. Visual/  Perceptual WFL     N/A   B UE Strength 4/5  Patient will demonstrate 4+/5 B UE strength in order to maximize independence with ADLs and functional transfers. Long-Term Goal: Patient will increase functional independence to PLOF in order to allow patient to live in least restrictive environment. ROM: Additional Information:    R UE  WFL    L UE WFL      Hearing: WFL  Sensation: No complaints of numbness/tingling in B UEs. Tone: WFL  Edema: No    Comments: RN approved patient's participation in EOB activities. Upon arrival, patient supine in bed. At end of session, patient supine in bed with call light and phone within reach and all lines and tubes intact. Patient would benefit from continued skilled OT to increase safety and independence with completion of ADL/IADL tasks for functional independence and quality of life. Patient education provided regardin) importance of EOB/OOB activities, 2) techniques to maximize safety with bed mobility.  Patient verbalized understanding. Eval Complexity: Low    Assessment of Current Deficits:   Functional Mobility [x]  ADLs [x] Strength [x]  Cognition []  Functional Transfers  [x] IADLs [x] Safety Awareness [x]  Endurance [x]  Fine Motor Coordination [] Balance [x] Vision/Perception [] Sensation []   Gross Motor Coordination [] ROM [] Delirium []                  Motor Control []    Plan of Care:   OT treatment to be provided 2-5x/week for 5-7 days PRN to address the following:  [x] ADL Re-Training/AE Recommendations  [x] Energy Conservation Techniques/Strategies      [x] Neuromuscular Re-Education     [x] Functional Transfer Training         [x] Functional Mobility Training          [] Cognitive Re-Training         [] Splinting/Positioning Needs           [x] Therapeutic Activity   [x] Therapeutic Exercise  [] Visual/Perceptual   [] Delirium Prevention/Treatment   [x] Positioning to Improve Functional [facility], Safety, and Skin Integrity   [x] Patient and/or Family Education to Increase Safety and Functional [facility]   [] Other:     Rehab Potential: Good for established goals. Patient / Family Goal: Patient did not state a goal.  Patient and/or family were instructed on functional diagnosis, prognosis/goals, and OT plan of care. Patient verbalized understanding. Low complexity evaluation + 0 timed treatment minutes  Time In: 1317  Time Out: 1330  Total Treatment Time: 0 minutes    Evaluation time includes thorough review of current medical information, gathering information on past medical history/social history and prior level of function, completion of standardized testing/informal observation of tasks, assessment of data, and development of POC/Goals. JA Iniguez/L  License Number: [id number]

## 2020-09-21 NOTE — PLAN OF CARE
Problem: Skin Integrity:  Goal: Will show no infection signs and symptoms  Description: Will show no infection signs and symptoms  Outcome: Met This Shift     Problem: Skin Integrity:  Goal: Absence of new skin breakdown  Description: Absence of new skin breakdown  Outcome: Met This Shift

## 2020-09-21 NOTE — PROGRESS NOTES
[address] [facility] Hospitalist   Progress Note    Admitting Date and Time: 9/17/2020 10:09 PM  Admit Dx: Acute renal failure (ARF) ([address] [N17.9]  Acute renal failure (ARF) ([facility]) [N17.9]    Subjective:    Pt feels much better this morning. Patient denies N/V/D dizziness. Patient states she continues to have still have penis every now and again, along with her head hurting from her fall. Case management working up [facility] at [facility]. Per RN: No new concerns noted. ROS: denies fever, chills, cp, sob, n/v, HA unless stated above.  cefTRIAXone (ROCEPHIN) IV  1 g Intravenous Q24H    apixaban  5 mg Oral BID    aspirin  81 mg Oral Daily    atorvastatin  40 mg Oral Daily    cetirizine  10 mg Oral Daily    docusate sodium  100 mg Oral BID    pantoprazole  40 mg Oral Daily    sodium chloride flush  10 mL Intravenous 2 times per day    budesonide  0.5 mg Nebulization BID    Arformoterol Tartrate  15 mcg Nebulization BID    miconazole nitrate   Topical BID     albuterol, 2.5 mg, Daily PRN  sodium chloride flush, 10 mL, PRN  acetaminophen, 650 mg, Q6H PRN    Or  acetaminophen, 650 mg, Q6H PRN  polyethylene glycol, 17 g, Daily PRN  promethazine, 12.5 mg, Q6H PRN    Or  ondansetron, 4 mg, Q6H PRN         Objective:    BP (!) 119/59   Pulse 100   Temp 98.5 °F (36.9 °C) (Oral)   Resp 16   Ht 5' 5\" (1.651 m)   Wt (!) 470 lb 12.8 oz (213.6 kg)   SpO2 94%   BMI 78.35 kg/m²   General Appearance: alert and oriented to person, place and time and in no acute distress  Skin: warm and dry  Head: normocephalic and atraumatic  Eyes: pupils equal, round, and reactive to light, extraocular eye movements intact, conjunctivae normal  Neck: neck supple and non tender without mass   Pulmonary/Chest: clear to auscultation bilaterally- no wheezes, rales or rhonchi, normal air movement, no respiratory distress  Cardiovascular: normal rate, normal S1 and S2 rubs, gallops, murmur noted.   Abdomen: soft, Closed head injury  Resolved Problems:    * No resolved hospital problems. *      Plan:  1. OLIVIA superimposed on CKD-2/2 prerenal azotemia from intractable N/V, hypotension and continued use of lisinopril. BUN/Crt 14/1.2 patient currently at baseline. Renal ultrasound unremarkable. No need to restart amlodipine, lisinopril, and metformin at [place]. Nephrology input appreciated. 2. Acute on chronic respiratory failure with hypoxia-currently ORA. SPO2 94%. Tolerating well. Continue to monitor closely. 3. N/V/D- resolved. No plan for scope at this time. Will for C. difficile negative OK for bariatric full liquid diet. Antiemetic. General surgery input appreciated. 4. UTI-currently asymptomatic.  UA+. Urine culture without growth so far. Continue ceftriaxone. Await final cultures. 5. Recent Fall/Weakness-2/2 hypovolemia. N/V/D? Carrie Peña CT head and cervical spine without acute pathology. PT/OT for evaluation. Continue falls precautions. CLAYTON upon DC Case management following. 6. Atrial Fibrillation-currently with controlled rate. Continue metoprolol/Eliquis. 7. HTN-continue current regimen. Well controlled at this time adjust as needed  8. HDL-continue statin therapy. 9. GERD-PPI. NOTE: This report was transcribed using voice recognition software. Every effort was made to ensure accuracy; however, inadvertent computerized transcription errors may be present. Electronically signed by Constance Melgar CNP on 9/21/2020 at 10:53 AM

## 2020-09-22 VITALS
OXYGEN SATURATION: 92 % | TEMPERATURE: 97.8 F | HEART RATE: 88 BPM | HEIGHT: 65 IN | RESPIRATION RATE: 18 BRPM | DIASTOLIC BLOOD PRESSURE: 79 MMHG | SYSTOLIC BLOOD PRESSURE: 145 MMHG | BODY MASS INDEX: 48.82 KG/M2 | WEIGHT: 293 LBS

## 2020-09-22 LAB
ANION GAP SERPL CALCULATED.3IONS-SCNC: 8 MMOL/L (ref 7–16)
BUN BLDV-MCNC: 11 MG/DL (ref 8–23)
CALCIUM SERPL-MCNC: 9.4 MG/DL (ref 8.6–10.2)
CHLORIDE BLD-SCNC: 108 MMOL/L (ref 98–107)
CO2: 23 MMOL/L (ref 22–29)
CREAT SERPL-MCNC: 1.1 MG/DL (ref 0.5–1)
GFR AFRICAN AMERICAN: 59
GFR NON-AFRICAN AMERICAN: 49 ML/MIN/1.73
GLUCOSE BLD-MCNC: 86 MG/DL (ref 74–99)
HCT VFR BLD CALC: 43.2 % (ref 34–48)
HEMOGLOBIN: 13.4 G/DL (ref 11.5–15.5)
MCH RBC QN AUTO: 28.5 PG (ref 26–35)
MCHC RBC AUTO-ENTMCNC: 31 % (ref 32–34.5)
MCV RBC AUTO: 91.7 FL (ref 80–99.9)
PDW BLD-RTO: 14.4 FL (ref 11.5–15)
PLATELET # BLD: 186 E9/L (ref 130–450)
PMV BLD AUTO: 10.7 FL (ref 7–12)
POTASSIUM SERPL-SCNC: 4 MMOL/L (ref 3.5–5)
RBC # BLD: 4.71 E12/L (ref 3.5–5.5)
SODIUM BLD-SCNC: 139 MMOL/L (ref 132–146)
WBC # BLD: 5.1 E9/L (ref 4.5–11.5)

## 2020-09-22 PROCEDURE — 85027 COMPLETE CBC AUTOMATED: CPT

## 2020-09-22 PROCEDURE — 97530 THERAPEUTIC ACTIVITIES: CPT

## 2020-09-22 PROCEDURE — 36415 COLL VENOUS BLD VENIPUNCTURE: CPT

## 2020-09-22 PROCEDURE — APPSS45 APP SPLIT SHARED TIME 31-45 MINUTES: Performed by: NURSE PRACTITIONER

## 2020-09-22 PROCEDURE — 99239 HOSP IP/OBS DSCHRG MGMT >30: CPT | Performed by: INTERNAL MEDICINE

## 2020-09-22 PROCEDURE — 6360000002 HC RX W HCPCS: Performed by: INTERNAL MEDICINE

## 2020-09-22 PROCEDURE — 94640 AIRWAY INHALATION TREATMENT: CPT

## 2020-09-22 PROCEDURE — 2580000003 HC RX 258: Performed by: INTERNAL MEDICINE

## 2020-09-22 PROCEDURE — 80048 BASIC METABOLIC PNL TOTAL CA: CPT

## 2020-09-22 PROCEDURE — 6370000000 HC RX 637 (ALT 250 FOR IP): Performed by: INTERNAL MEDICINE

## 2020-09-22 RX ORDER — FERROUS SULFATE 325(65) MG
325 TABLET ORAL
COMMUNITY

## 2020-09-22 RX ORDER — PANTOPRAZOLE SODIUM 40 MG/1
40 TABLET, DELAYED RELEASE ORAL DAILY
Qty: 30 TABLET | Refills: 3 | Status: SHIPPED | OUTPATIENT
Start: 2020-09-23 | End: 2021-01-05

## 2020-09-22 RX ORDER — LEVOTHYROXINE SODIUM 0.2 MG/1
200 TABLET ORAL DAILY
Qty: 30 TABLET | Refills: 0 | DISCHARGE
Start: 2020-09-22

## 2020-09-22 RX ORDER — PREGABALIN 75 MG/1
75 CAPSULE ORAL 2 TIMES DAILY
COMMUNITY
End: 2021-11-23 | Stop reason: CLARIF

## 2020-09-22 RX ORDER — CYCLOBENZAPRINE HCL 10 MG
10 TABLET ORAL DAILY
Status: ON HOLD | COMMUNITY
End: 2020-09-22 | Stop reason: HOSPADM

## 2020-09-22 RX ORDER — LEVOTHYROXINE SODIUM 0.05 MG/1
50 TABLET ORAL DAILY
Qty: 30 TABLET | Refills: 0 | DISCHARGE
Start: 2020-09-22 | End: 2021-11-23 | Stop reason: CLARIF

## 2020-09-22 RX ADMIN — SODIUM CHLORIDE, PRESERVATIVE FREE 10 ML: 5 INJECTION INTRAVENOUS at 09:28

## 2020-09-22 RX ADMIN — ATORVASTATIN CALCIUM 40 MG: 40 TABLET, FILM COATED ORAL at 09:28

## 2020-09-22 RX ADMIN — ARFORMOTEROL TARTRATE 15 MCG: 15 SOLUTION RESPIRATORY (INHALATION) at 08:16

## 2020-09-22 RX ADMIN — CETIRIZINE HYDROCHLORIDE 10 MG: 10 TABLET, FILM COATED ORAL at 09:28

## 2020-09-22 RX ADMIN — ASPIRIN 81 MG: 81 TABLET, COATED ORAL at 09:28

## 2020-09-22 RX ADMIN — APIXABAN 5 MG: 5 TABLET, FILM COATED ORAL at 09:28

## 2020-09-22 RX ADMIN — PANTOPRAZOLE SODIUM 40 MG: 40 TABLET, DELAYED RELEASE ORAL at 09:28

## 2020-09-22 RX ADMIN — BUDESONIDE 500 MCG: 0.5 SUSPENSION RESPIRATORY (INHALATION) at 08:16

## 2020-09-22 ASSESSMENT — PAIN SCALES - GENERAL: PAINLEVEL_OUTOF10: 0

## 2020-09-22 NOTE — DISCHARGE INSTR - COC
Continuity of Care Form    Patient Name: Kostas Gutierrez   :    MRN:  [id number]    Admit date:  2020  Discharge date:  2020    Code Status Order: Full Code   Advance Directives:   [facility] Documentation     Date/Time Healthcare Directive Type of Healthcare Directive Copy in [address] Agent's Name Healthcare Agent's Phone Number    [phone]  Yes, patient has an advance directive for healthcare treatment  Durable power of  for health care  No, copy requested from family  Adult [facility]  --          Admitting Physician:  Torri Rinaldi MD  PCP: Noman De La Torre MD    Discharging Nurse: [facility] Unit/Room#: 7463/0307-V  Discharging Unit Phone Number: [phone]    Emergency Contact:   Extended Emergency Contact Information  Primary Emergency Contact: Jocelyne Curiel  Address: [facility], [address] [facility] Phone: [phone]  Work Phone: [phone]  Mobile Phone: [phone]  Relation: Child  Secondary Emergency Contact: Zayda Jarquin  Address: /[facility], [address] [facility] Phone: [phone]  Mobile Phone: [phone]  Relation: Grandchild    Past Surgical History:  Past Surgical History:   Procedure Laterality Date    APPENDECTOMY      CARPAL TUNNEL RELEASE Bilateral     17 years ago    COLONOSCOPY      ENDOSCOPY, COLON, DIAGNOSTIC      FOOT SURGERY Right 2009    FOOT SURGERY  2014    reconstructive for hammer toes all four, not big toe left foot    HAMMER TOE SURGERY Left 2017    HYSTERECTOMY      JOINT REPLACEMENT  bilat knee    OTHER SURGICAL HISTORY Left 2014    hammer toe correction 2-4left foot and hardware;left calcaneous x2,flexor digital longis repair    TONSILLECTOMY         Immunization History: There is no immunization history on file for this patient.     Active Problems:  Patient Active Problem List   Diagnosis Code    SHALA on CPAP G47.33, Z99.89    Obesity hypoventilation syndrome ([facility]) E66.2    COPD (chronic obstructive pulmonary disease) ([facility]) J44.9    OLIVIA (acute kidney injury) ([facility] N17.9    Hyperlipidemia LDL goal <100 E78.5    Morbid obesity with BMI of 45.0-49.9, adult ([facility] E66.01, Z68.42    Acquired hypothyroidism E03.9    DM (diabetes mellitus), type 2 ([facility] E11.9    Acute on chronic respiratory failure with hypoxia and hypercapnia ([facility]) J96.21, J96.22    PAF (paroxysmal atrial fibrillation) ([facility]) I48.0    Open abdominal wall wound S31.109A    Pre-syncope R55    Hypotension due to hypovolemia I95.89, E86.1    Intractable vomiting with nausea R11.2    Acute cystitis with hematuria N30.01    Acute renal failure (ARF) ([facility]) N17.9    Closed head injury S09.90XA       Isolation/Infection:   Isolation          No Isolation        Patient Infection Status     Infection Onset Added Last Indicated Last Indicated By Review Planned Expiration Resolved Resolved By    None active    Resolved    C-diff Rule Out 09/19/20 09/19/20 09/19/20 Clostridium difficile EIA (Ordered)   09/20/20 Rule-Out Test Resulted    COVID-19 Rule Out 09/18/20 09/18/20 09/18/20 Covid-19 Ambulatory (Ordered)   09/20/20 Rule-Out Test Resulted    MRSA 08/12/19 08/17/19 08/12/19 Wound Culture   09/22/20 Fred Castaneda RN          Nurse Assessment:  Last Vital Signs: BP (!) 145/79   Pulse 88   Temp 97.8 °F (36.6 °C) (Oral)   Resp 18   Ht 5' 5\" (1.651 m)   Wt (!) 434 lb (196.9 kg)   SpO2 92%   BMI 72.22 kg/m²     Last documented pain score (0-10 scale): Pain Level: 0  Last Weight:   Wt Readings from Last 1 Encounters:   09/22/20 (!) 434 lb (196.9 kg)     Mental Status:  oriented and alert    IV Access:  - None    Nursing Mobility/ADLs:  Walking   Assisted  Transfer  Assisted  Bathing  Assisted  Dressing  Assisted  Toileting  Assisted  Feeding  Independent  Med Admin  Independent  Med Delivery   whole    Wound Care Documentation and Therapy:        Elimination:  Continence:   · Bowel: Yes  · Bladder: Yes  Urinary Catheter: Removal Date 9/21/2020   Colostomy/Ileostomy/Ileal Conduit: No       Date of Last BM: 9/20/2020    Intake/Output Summary (Last 24 hours) at 9/22/2020 1225  Last data filed at 9/22/2020 1217  Gross per 24 hour   Intake 540 ml   Output 1650 ml   Net -1110 ml     I/O last 3 completed shifts: In: 1700 [P.O.:1700]  Out: 550 [Urine:550]    Safety Concerns:     History of Falls (last 30 days)    Impairments/Disabilities:      None    Nutrition Therapy:  Current Nutrition Therapy:   - Oral Diet:  Full Liquid    Routes of Feeding: Oral  Liquids: Thin Liquids  Daily Fluid Restriction: no  Last Modified Barium Swallow with Video (Video Swallowing Test): not done    Treatments at the Time of Hospital Discharge:   Respiratory Treatments: n/a  Oxygen Therapy:  is not on home oxygen therapy. Ventilator:    - No ventilator support    Rehab Therapies: Physical Therapy and Occupational Therapy  Weight Bearing Status/Restrictions: No weight bearing restirctions  Other Medical Equipment (for information only, NOT a DME order):  wheelchair and walker  Other Treatments: n/a    Patient's personal belongings (please select all that are sent with patient):  {[facility] DME Belongings:[id number]}    RN SIGNATURE:  Electronically signed by Arabella Lewis RN on 9/22/20 at 2:34 PM EDT    CASE MANAGEMENT/SOCIAL WORK SECTION    Inpatient Status Date: 9/18/2020    Readmission Risk Assessment Score:  Readmission Risk              Risk of Unplanned Readmission:        15           Discharging to Facility/ Agency   · Name: [place]  · Address: [address] [facility] [address], [address]  · Phone: [phone]  · Fax: [phone]    Dialysis Facility (if applicable)   · Name:  · Address:  · Dialysis Schedule:  · Phone:  · Fax:    / signature: Electronically signed by NADIA Holbrook on 9/22/20 at 12:26 PM EDT    PHYSICIAN SECTION    Prognosis: Good    Condition at Discharge: Stable    Rehab Potential (if transferring to Rehab): Good    Recommended Labs or Other Treatments After Discharge: ***    Physician Certification: I certify the above information and transfer of Lawson Dear  is necessary for the continuing treatment of the diagnosis listed and that she requires [facility] for less 30 days.      Update Admission H&P: {[facility] DME Changes in LOVEA:[id number]}    PHYSICIAN SIGNATURE: BAY DIMAS DO

## 2020-09-22 NOTE — DISCHARGE SUMMARY
[address] [facility] Hospitalist       Hospitalist Physician Discharge Summary       Cam Tapia, [address] [facility] [address] [place] [address] [phone]    Schedule an appointment as soon as possible for a visit  Please call for follow up post hospital stay appt. Farheen Dinh for [address] [facility] [address] [phone]          Activity level: As tolerated with assistance  Diet: Dietary Nutrition Supplements: Diabetic Oral Supplement  DIET BARIATRIC FULL LIQUID;        Condition at discharge: Stable  Dispo: [facility]      Patient ID:  Lawson Dejesusr  [id number]  79 y.o.  1950    Admit date: 9/17/2020    Discharge date and time:  9/22/2020  2:04 PM    Admission Diagnoses: Principal Problem:    Hypotension due to hypovolemia  Active Problems:    Obesity hypoventilation syndrome ([address]    OLIVIA (acute kidney injury) ([address]    COPD (chronic obstructive pulmonary disease) ([address]    Morbid obesity with BMI of 45.0-49.9, adult ([address]    DM (diabetes mellitus), type 2 (HCC)    PAF (paroxysmal atrial fibrillation) (HCC)    Pre-syncope    Intractable vomiting with nausea    Acute cystitis with hematuria    Acute renal failure (ARF) ([address]    Closed head injury  Resolved Problems:    * No resolved hospital problems. *      Discharge Diagnoses: Principal Problem:    Hypotension due to hypovolemia  Active Problems:    Obesity hypoventilation syndrome (HCC)    OLIVIA (acute kidney injury) ([address]    COPD (chronic obstructive pulmonary disease) (HCC)    Morbid obesity with BMI of 45.0-49.9, adult (HCC)    DM (diabetes mellitus), type 2 (HCC)    PAF (paroxysmal atrial fibrillation) (HCC)    Pre-syncope    Intractable vomiting with nausea    Acute cystitis with hematuria    Acute renal failure (ARF) ([address]    Closed head injury  Resolved Problems:    * No resolved hospital problems.  *      Consults:  IP CONSULT TO RESPIRATORY CARE  IP CONSULT TO SOCIAL WORK  IP CONSULT TO IV TEAM  IP CONSULT TO GENERAL SURGERY  IP CONSULT TO IV TEAM  IP CONSULT TO IV TEAM  IP CONSULT TO NEPHROLOGY    Procedures: None    Hospital Course:     517/2020 70-year-old female with PMH arthritis, CVA, COPD, DM, GERD, HLD, HTN, and hypothyroidism presented to [facility] ED for fall. Patient states she was getting up to the bathroom felt lightheaded dizzy with generalized weakness lost her balance and fell hitting her head. No loss of consciousness noted. Patient with history of gastric sleeve August 14 at [facility] Esophagram done was normal.  Noted to have UTI. Patient unable to keep down antibiotics 2/2 vomiting. Fevers, chills, neck pain noted. Patient noted to have hypotension and hypoxia and ED course. IV antibiotics initiated IV fluid hydration. CT of the head and cervical spine without acute pathology. CXR negative. OLIVIA 2/2 severe prerenal azotemia from intractable nausea vomiting, severe hypotension, continued use of lisinopril in the setting. Renal ultrasound was unremarkable for obstruction. General surgery evaluation with no plans for EGD at this time. Recommend outpatient follow-up with surgeons at [facility]. No acute surgical intervention as well. Oxygen supplementation was able to be weaned. Patient N/V resolved. PT/OT evaluation 15/24. Patient requesting placement to [facility] upon DC. Patient remained hemodynamically stable at this time and will be discharged to [place] [address]. Will be instructed to follow-up with PCP upon DC.     Discharge Exam:  Vitals:    09/21/20 2130 09/22/20 0530 09/22/20 0800 09/22/20 1027   BP: (!) 152/82  (!) 145/79    Pulse: 88  88    Resp: 18  18    Temp: 98.2 °F (36.8 °C)  97.8 °F (36.6 °C)    TempSrc: Oral  Oral    SpO2: 97%  92%    Weight:  (!) 469 lb 6.4 oz (212.9 kg)  (!) 434 lb (196.9 kg)   Height:           General Appearance: alert and oriented to person, place and time and in no acute distress  Skin: warm and dry  Head: normocephalic and atraumatic  Eyes: pupils equal, round, and reactive to light, extraocular eye movements intact, conjunctivae normal  Neck: neck supple and non tender without mass   Pulmonary/Chest: clear to auscultation bilaterally- no wheezes, rales or rhonchi, normal air movement, no respiratory distress  Cardiovascular: normal rate, normal S1 and S2 rubs, gallops, murmur noted. Abdomen: soft, non-tender, non-distended, normal bowel sounds, rebound, guarding, rigidity noted. Extremities: no cyanosis, no clubbing and 1+ edema BLE   Neurologic: no cranial nerve deficit and speech normal  I/O last 3 completed shifts: In: 1700 [P.O.:1700]  Out: 550 [Urine:550]  I/O this shift:  In: 300 [P.O.:300]  Out: 1450 [Urine:1450]      LABS:  Recent Labs     20  0633 20  0537 20  0348    141 139   K 5.1* 4.3 4.0   * 112* 108*   CO2 24 25 23   BUN 19 14 11   CREATININE 1.5* 1.2* 1.1*   GLUCOSE 87 100* 86   CALCIUM 8.9 9.0 9.4       Recent Labs     20  0633 20  0537 20  0348   WBC 5.7 5.3 5.1   RBC 4.31 4.59 4.71   HGB 12.6 13.2 13.4   HCT 41.2 43.0 43.2   MCV 95.6 93.7 91.7   MCH 29.2 28.8 28.5   MCHC 30.6* 30.7* 31.0*   RDW 14.6 14.6 14.4    177 186   MPV 10.5 10.3 10.7       No results for input(s): POCGLU in the last 72 hours. Imaging:  Ct Head Wo Contrast    Result Date: 2020  Patient MRN:  [id number] : 1950 Age: 79 years Gender: Female Order Date:  2020 11:35 PM TECHNIQUE/NUMBER OF IMAGES/COMPARISON/CLINICAL HISTORY: CT brain Axial images were obtained sagittal and coronal reconstructions Comparison  70-year-old female patient history of trauma injury pain. FINDINGS: There are unremarkable appearance for peripheral CSF space and ventricular system. There is no focal mass defect or midline shift. There is no evidence for a sizable area of an acute or recent insult in progression to the brain parenchyma. Midline structures have unremarkable appearance.  There is no indication for an acute intracranial hemorrhagic event. Images with bone window settings demonstrate no significant findings. No indication for an acute intracranial process. Ct Cervical Spine Wo Contrast    Result Date: 9/17/2020  Clinical indications: Pain status post trauma. COMPARISON: X-rays of the cervical spine February 1, 2019 Exposure control: This examination and all examinations utilizing ionizing radiation at this facility done so according to the ALARA (as low as reasonably achievable) principal for radiation dose reduction. TECHNIQUE: Axial, sagittal, and coronal computed tomography of the cervical spine was performed without contrast. FINDINGS: These images reveal no evidence for acute displaced cortical disruption or spondylolisthesis. There is mild cervical kyphosis. There is diffuse loss of disc height, degenerative spondylosis, uncovertebral hypertrophy and facet arthropathy. These degenerative osseous changes result in multilevel central and foraminal stenosis. There are calcifications within the regional arteries. Otherwise regional soft tissue and osseous structures are unremarkable. No acute fracture or spondylolisthesis is identified on CT of cervical spine. Diffuse degenerative disc and facet disease result in multilevel central and foraminal stenosis. Cervical kyphosis which can be due to muscle spasm or positional. Atherosclerotic vascular disease. Xr Chest Portable    Result Date: 9/17/2020  Clinical indications: Cough. Shortness of breath. TECHNIQUE: Single frontal projection of the chest (1 view). COMPARISON: September 8, 2019. FINDINGS: The heart is enlarged. There is calcification within thoracic aorta. Acromioclavicular arthropathy. The heart, lungs, mediastinum and regional skeleton are otherwise unremarkable. No evidence for acute cardiopulmonary process.          Patient Instructions:   Current Discharge Medication List      START taking these medications daily  Qty: 180 tablet, Refills: 3    Associated Diagnoses: Hyperlipidemia LDL goal <100; Hypertension, unspecified type; Atrial fibrillation, unspecified type ([address] SHALA on CPAP; HOROWITZ (dyspnea on exertion)      metoprolol tartrate (LOPRESSOR) 25 MG tablet Take 1 tablet by mouth 2 times daily Take morning of surgery with a sip of water  Qty: 180 tablet, Refills: 3    Associated Diagnoses: Hyperlipidemia LDL goal <100; Hypertension, unspecified type; Atrial fibrillation, unspecified type ([address] SHALA on CPAP; HOROWITZ (dyspnea on exertion)      atorvastatin (LIPITOR) 40 MG tablet Take 40 mg by mouth daily      cetirizine (ZYRTEC) 5 MG tablet Take 10 mg by mouth daily       docusate sodium (COLACE) 100 MG capsule Take 100 mg by mouth 2 times daily       aspirin 81 MG tablet Take 81 mg by mouth daily LD 3/7/18 per surgeon         STOP taking these medications       cyclobenzaprine (FLEXERIL) 10 MG tablet Comments:   Reason for Stopping:         HYDROcodone-acetaminophen (NORCO)  MG per tablet Comments:   Reason for Stopping:         cefdinir (OMNICEF) 300 MG capsule Comments:   Reason for Stopping:         ondansetron (ZOFRAN ODT) 4 MG disintegrating tablet Comments:   Reason for Stopping:         lisinopril (PRINIVIL;ZESTRIL) 10 MG tablet Comments:   Reason for Stopping:         metFORMIN (GLUCOPHAGE) 500 MG tablet Comments:   Reason for Stopping:         amLODIPine (NORVASC) 5 MG tablet Comments:   Reason for Stopping:                 Note that more than 30 minutes was spent in preparing discharge papers, discussing discharge with patient, medication review, etc.    NOTE: This report was transcribed using voice recognition software. Every effort was made to ensure accuracy; however, inadvertent computerized transcription errors may be present.     Signed:  Electronically signed by Iman Melgar CNP on 9/22/2020 at 2:04 PM

## 2020-09-22 NOTE — PROGRESS NOTES
Physical Therapy    Facility/Department: [address] INTERMEDIATE 1  Initial Assessment    NAME: Wilma Mosley  : 1950  MRN: [id number]    Date of Service: 2020            Patient Diagnosis(es): The primary encounter diagnosis was Closed head injury, initial encounter. Diagnoses of Cervical strain, acute, initial encounter, Hypoxia, Acute renal failure, unspecified acute renal failure type ([address] Hypovolemia, and Acute cystitis with hematuria were also pertinent to this visit. has a past medical history of Arthritis, Cerebral artery occlusion with cerebral infarction ([address] Cigarette nicotine dependence without complication, COPD (chronic obstructive pulmonary disease) ([address] Depression, Diabetes mellitus ([address] GERD (gastroesophageal reflux disease), Hyperlipidemia, Hypertension, Hypothyroidism, Obese, SHALA (obstructive sleep apnea), and Palate mass. has a past surgical history that includes Hysterectomy; Foot surgery (Right, ); Appendectomy; Tonsillectomy; Colonoscopy; Endoscopy, colon, diagnostic; Carpal tunnel release (Bilateral); other surgical history (Left, 2014); Foot surgery (); Hammer toe surgery (Left, 2017); and joint replacement (bilat knee).    Referring Provider:  Frieda Ruiz MD         Evaluating Therapist: Zena Frankel PT        Room #:435  DIAGNOSIS: Acute renal failure  Additional Pertinent History:CVA  PRECAUTIONS: falls     Social:  Pt lives alone in a 1 floor plan has lift to enter home. Ambulates short distance with ww, mostly uses power chair. Has assist from home health aide and daughter.     Initial Evaluation  Date: 20 Treatment   2020   Short Term/ Long Term   Goals   Was pt agreeable to Eval/treatment? yes  yes     Does pt have pain?  L foot  L foot      Bed Mobility  Rolling: min assist  Supine to sit: min assist  Sit to supine: mod assist  Scooting: min assist Supine to Sit :  SBA/CGA SBA   Transfers Sit to stand: min assist  Stand to sit: min assist  Stand pivot: NT  Sit <> stand : CGA SBA   Ambulation    3 feet with bariatric ww with min assist sidestepping along bed  4 feet with bed to chair with ww CGA  25 feet with ww with SBA   Stair Negotiation  Ascended and descended  NT    N/A   LE strength     3/5     3+/5   balance      fair       AM-PAC Raw score               15/24  16/ 24            Pt is alert and Oriented   LE ROM: WFL    Edema: B LE's  Endurance: decreased     ASSESSMENT  Pt displays functional ability as noted in the objective portion of this treatment     Patient education  Pt educated on PT objectives, safe with and importance of mobility      Patient response to education:   Pt verbalized understanding Pt demonstrated skill Pt requires further education in this area   yes  with cues   x           Comments:  Pt repots she only walks very short distances at home. Pt did not want to walk further . Left in recliner chair with LEs elevated         PLAN:     PLAN OF CARE:     Current Treatment Recommendations      [x]? Strengthening     []? ROM   [x]? Balance Training   [x]? Endurance Training   [x]? Transfer Training   [x]? Gait Training   []? Stair Training   []? Positioning   [x]? Safety and Education Training   [x]? Patient/Caregiver Education   []? HEP  []? Other      Frequency of treatments: 2-5x/week x 5-7days     Time in  1135   Time out  1150              CPT codes:  []? Low Complexity PT evaluation [id number]  []? Moderate Complexity PT evaluation 60719  []? High Complexity PT evaluation [id number]  []? PT Re-evaluation [id number]  []? Gait training 73503 minutes  []? Manual therapy 78685 minutes  [x]? Therapeutic activities 53165 10 minutes  []? Therapeutic exercises 07412 minutes  []?  Neuromuscular reeducation 86958 minutes     Pedro Robles   PT 8624

## 2020-09-22 NOTE — PROGRESS NOTES
mobility. Patient demonstrated reliance upon overhead trapeze and/or bed rails for all bed mobility. CGA supine to sit       Functional Transfers Sit-to-Stand: Min A  from EOB (elevated surface)  Cues given to maximize safety. CGA to stand from bed surface. Cues for safety.   Supervision   Functional Mobility Min A   (with walker) for few steps to [facility]. Pivot to chair CGA using w/w   Supervision with functional mobility (with device, as needed/appropriate) in order to maximize independence with ADLs/IADLs and other functional tasks. Balance Sitting: Good-  (at EOB)  Standing: Fair  (with walker)   Fair+ dynamic standing balance during completion of ADLs/IADLs and other functional tasks. Activity Tolerance Limited Poor+   Patient will demonstrate Good understanding and consistent implementation of energy conservation techniques and work simplification techniques into ADL routines. B UE Strength 4/5   Patient will demonstrate 4+/5 B UE strength in order to maximize independence with ADLs and functional transfers. Comments:  Pt remained in chair at end of the session. Education/treatment:  Therapeutic activity to address balance and endurance for ADL and transfers. ADL retraining with education of adaptive equipment options for LB dressing/bathing. · Pt has made limited  progress towards set goals.      Time In: 11:34  Time Out: 11:45     Min Units   Therapeutic Ex 50985     Therapeutic Activities 50160 8    ADL/Self Care 49560 3    Orthotic Management 10984     Neuro Re-Ed 61546     Non-Billable Time     TOTAL TIMED TREATMENT 11 [address] [facility] ROMANA/L 31113

## 2020-09-22 NOTE — PROGRESS NOTES
Nephrology Consult Note    Patient's Name: Jennifer Mosley  1:53 PM  9/22/2020    Nephrologist: Dr. Laney Zuñiga MD    Reason for Consult: Renal is consulted for acute kidney injury  Requesting Physician:  Dr. Gely Beltran  Chief Complaint: Intractable nausea and vomiting, weakness and fall    History Obtained From: Patient and EMR    Sub: Patient seen and examined bedside  Reports no new complaints today  BP is better      Past Medical History:   Diagnosis Date    Arthritis     Cerebral artery occlusion with cerebral infarction [facility]) 1998?    tia     Cigarette nicotine dependence without complication 3/28/4785    COPD ([facility]) [facility]     Depression 10/2014    \"doing ok\". anxiety    Diabetes mellitus ([facility]     prediabetic    GERD (gastroesophageal reflux disease)     Hyperlipidemia     Hypertension     Hypothyroidism     Obese 10/2014    states weight aprox. 400 lbs.  SHALA (obstructive sleep apnea)     no treatment    Palate mass        Past Surgical History:   Procedure Laterality Date    APPENDECTOMY      CARPAL TUNNEL RELEASE Bilateral     17 years ago    COLONOSCOPY      ENDOSCOPY, COLON, DIAGNOSTIC      FOOT SURGERY Right 2009    FOOT SURGERY  2014    reconstructive for hammer toes all four, not big toe left foot    HAMMER TOE SURGERY Left 03/06/2017    HYSTERECTOMY      JOINT REPLACEMENT  bilat knee    OTHER SURGICAL HISTORY Left 11/5/2014    hammer toe correction 2-4left foot and hardware;left calcaneous x2,flexor digital longis repair    TONSILLECTOMY         Family History   Problem Relation Age of Onset    Heart Disease Mother     Cancer Father     Cancer Sister     Cancer Brother     Cancer Son         reports that she has quit smoking. Her smoking use included cigarettes and cigars. She has never used smokeless tobacco. She reports that she does not drink alcohol or use drugs. Allergies:  Food;  Iodine; Darvocet [propoxyphene n-acetaminophen]; Influenza vaccines; Lamictal [lamotrigine];  Other; Pentazocine lactate; and Talwin [pentazocine]    Current Medications:    albuterol (PROVENTIL) nebulizer solution 2.5 mg, Daily PRN  apixaban (ELIQUIS) tablet 5 mg, BID  aspirin EC tablet 81 mg, Daily  atorvastatin (LIPITOR) tablet 40 mg, Daily  cetirizine (ZYRTEC) tablet 10 mg, Daily  docusate sodium (COLACE) capsule 100 mg, BID  pantoprazole (PROTONIX) tablet 40 mg, Daily  sodium chloride flush 0.9 % injection 10 mL, 2 times per day  sodium chloride flush 0.9 % injection 10 mL, PRN  acetaminophen (TYLENOL) tablet 650 mg, Q6H PRN    Or  acetaminophen (TYLENOL) suppository 650 mg, Q6H PRN  polyethylene glycol (GLYCOLAX) packet 17 g, Daily PRN  promethazine (PHENERGAN) tablet 12.5 mg, Q6H PRN    Or  ondansetron (ZOFRAN) injection 4 mg, Q6H PRN  budesonide (PULMICORT) nebulizer suspension 500 mcg, BID  Arformoterol Tartrate (BROVANA) nebulizer solution 15 mcg, BID  miconazole nitrate 2 % ointment, BID        Review of Systems:   10 point review of system done at this time is negative except for the things mentioned in the HPI    Physical exam:   Constitutional:    Vitals: BP (!) 145/79   Pulse 88   Temp 97.8 °F (36.6 °C) (Oral)   Resp 18   Ht 5' 5\" (1.651 m)   Wt (!) 434 lb (196.9 kg)   SpO2 92%   BMI 72.22 kg/m²      Morbidly obese elderly  female lying in bed, sleepy, appears in no acute distress, off of supplemental oxygen  Skin: no rash, turgor wnl  Heent:  eomi, mmm  Neck: no bruits or jvd noted  Cardiovascular:  S1, S2 without m/r/g  Respiratory: CTA B without w/r/r, distant breath sounds, decreased in bases  Abdomen:  +bs, soft, nt, distended and obese Peterson in place draining clear urine  Ext: Trace bilateral lower extremity edema  Psychiatric: mood and affect appropriate  Musculoskeletal:  Rom, muscular strength intact    Data:   Labs:  CBC:   Lab Results   Component Value Date    WBC 5.1 09/22/2020    RBC 4.71 09/22/2020    HGB 13.4 09/22/2020 HCT 43.2 2020    MCV 91.7 2020    MCH 28.5 2020    MCHC 31.0 2020    RDW 14.4 2020     2020    MPV 10.7 2020     CMP:    Lab Results   Component Value Date     2020    K 4.0 2020    K 4.0 10/31/2018     2020    CO2 23 2020    BUN 11 2020    CREATININE 1.1 2020    GFRAA 59 2020    LABGLOM 49 2020    GLUCOSE 86 2020    PROT 5.9 2020    LABALBU 3.0 2020    CALCIUM 9.4 2020    BILITOT <0.2 2020    ALKPHOS 62 2020    AST 15 2020    ALT 15 2020     BMP:    Lab Results   Component Value Date     2020    K 4.0 2020    K 4.0 10/31/2018     2020    CO2 23 2020    BUN 11 2020    LABALBU 3.0 2020    CREATININE 1.1 2020    CALCIUM 9.4 2020    GFRAA 59 2020    LABGLOM 49 2020    GLUCOSE 86 2020     Calcium:    Lab Results   Component Value Date    CALCIUM 9.4 2020     Phosphorus:    Lab Results   Component Value Date    PHOS 3.5 2016     Uric Acid:  No results found for: URICACID  U/A:    Lab Results   Component Value Date    NITRU Negative 2020    COLORU Yellow 2020    PHUR 5.0 2020    WBCUA >20 2020    RBCUA 2-5 2020    BACTERIA FEW 2020    CLARITYU SL CLOUDY 2020    SPECGRAV >=1.030 2020    LEUKOCYTESUR MODERATE 2020    UROBILINOGEN 0.2 2020    BILIRUBINUR SMALL 2020    BLOODU SMALL 2020    GLUCOSEU Negative 2020    KETUA 15 2020    AMORPHOUS FEW 2016      Results for Ileana Spoon (MRN [id number]) as of 2020 20:02   Ref.  Range 2020 18:50   Total CK Latest Ref Range: 20 - 180 U/L 57     Imaging:  Ct Head Wo Contrast    Result Date: 2020  Patient MRN:  [id number] : 1950 Age: 79 years Gender: Female Order Date:  2020 11:35 PM TECHNIQUE/NUMBER OF Date: 2020  Clinical indications: Cough. Shortness of breath. TECHNIQUE: Single frontal projection of the chest (1 view). COMPARISON: 2019. FINDINGS: The heart is enlarged. There is calcification within thoracic aorta. Acromioclavicular arthropathy. The heart, lungs, mediastinum and regional skeleton are otherwise unremarkable. No evidence for acute cardiopulmonary process. Us Retroperitoneal Complete    Result Date: 2020  Patient MRN: [id number] : 1950 Age:  79 years Gender: Female Order Date: 2020 1:21 PM Exam: US RETROPERITONEAL COMPLETE Number of Images: 36 views Indication:   Possible obstruction and increasing creatinine Possible obstruction and increasing creatinine Comparison: None. Real-time sonogram study of the right and left kidneys shows normal size and shaped kidneys. The right kidney measures 10.0 x 5.5 x 5.8 cm , and the left kidney measures 11.0 x 4.9 x 4.8 cm . Both kidneys have normal cortical thickness. There is no apparent hydronephrosis or renal mass. The retroperitoneal area appears unremarkable. No evidence of aneurysmal dilatation of the aorta is seen. The urinary bladder is not visualized. NORMAL RENAL SONOGRAM.       Assessment  1. Acute kidney injury, secondary to severe prerenal azotemia from intractable nausea and vomiting, severe hypotension, continued use of lisinopril in this setting. With 2 falls at home will also rule out underlying rhabdomyolysis/pigment induced nephropathy  2. Stage III chronic kidney disease with a baseline creatinine of 1.3-1.7 secondary to hypertension and diabetes  3. Urinary tract infection on IV ceftriaxone    Plan  1. Urine electrolytes reviewed, calculated fractional excretion of sodium is 0.3% strongly consistent with prerenal injury, renal ultrasound is unremarkable for an obstruction. 2. Renal function improved and her creatinine is back to baseline with IV NS and Albumin  3.  Off of IV fluids now, no need to restart amlodipine, lisinopril and metformin at discharge  4.  Kristen removed  Follow-up in our office in 4 weeks with Dr. Rosamaria Strauss    Thank you very much for the consult Dr. Patti Chavez I will follow her closely along with                Electronically signed by Liz Elizabeth MD on 9/22/2020 at 1:53 PM

## 2020-09-22 NOTE — PROGRESS NOTES
Spoke with Rolo Valadez NP informed pt has been accepted to [facility]. Will check discharge with dr and call back.

## 2020-09-22 NOTE — PROGRESS NOTES
Addendum  I have personally participated in the history, exam, medical decision making with Rc Husbands NP on the date of service, I have personally reviewed imaging and lab data as well as EKG and I agree with all of the pertinent clinical information unless otherwise noted. I have also reviewed and agree with the past medical, family, and social history unless otherwise noted. 9/18 Olimpia is a 70F w PMH arthritis, COPD w tobacco abuse, a fib on Eliquis, DM, HPL, HTN, obesity, SHALA, palate mass, and arthritis who was in the bathroom, got up and felt dizzy and weak and pt subsequently fell and hit her head with intermittent confusion.  Was seen in ED 9/16 for vomiting; noted to have a UTI.  Pt had been unable to keep antibiotics down.  Admitted to nausea and emesis.  No fevers, chills, neck pain, chest pain, palpitations, diaphoresis, abd pain, melena, hematochezia.  Found to be hypovolemic w renal insufficiency.  Additionally, it was noted that pt had a gastric sleeve done 8/14 with normal esophagram on 9/16.       Today, BP a bit soft at 72/36 though HR and RR controlled.  Pulse ox is 97% on 3L w CXR clear. Earma Arn consistent with UTI and pt started on Rocephin.  Cr was 1.1 on 9/16 which is baseline but 2.4 last evening w baseline roughly 1.1-1.7.  BUN at 20 and GFR 20 w baseline 30-50.  BNP up a bit at 953 but again CXR was clear.  EKG showed a fib and no ischemic changes.  CT head and cervical spine were unremarkable for acute pathology.     9/19 No real improvement in renal fn w IVF, will check renal US and consult nephro.  BP remains low, will dc BB for now; pt asymptomatic.  Had some diarrhea, stool sent for C diff.      9/20 Seen sitting up in bed.  Feeling generally well.  No diarrhea today, stool negative for C diff.  Pt not happy about her oxygen because it is causing sores in her nose and had O2 off on my exam with no demonstrable shortness of breath.  Says her shakes are much better today.   Some pain around the occiput from her fall.  Curious if she will be going home or [place] for rehab as she had been falling at home.     9/21 Back on room air today. Renal fn essentially back to baseline today so from that standpoint does not likely require further hospitalization though w pt's falls prior to admission she fears that she would be unsafe at home and will await PT and OT evals for hopeful placement at [facility]. 9/22 Renal fn remains at baseline and pt accepted at [facility].   I feel she is stable for this transition today and plan is for  at 3 pm.    Physical Exam  BP (!) 145/79   Pulse 88   Temp 97.8 °F (36.6 °C) (Oral)   Resp 18   Ht 5' 5\" (1.651 m)   Wt (!) 469 lb 6.4 oz (212.9 kg)   SpO2 92%   BMI 78.11 kg/m²   General Appearance: alert and oriented to person, place and time and in no acute distress  Skin: warm and dry, no rashes or lesions noted  Head: normocephalic and atraumatic  Eyes: pupils equal, round, and reactive to light, extraocular eye movements intact, conjunctivae normal  Neck: neck supple and non tender without mass   Pulmonary/Chest: diminished to auscultation bilaterally- no wheezes, rales or rhonchi, normal air movement, no respiratory distress on room air  Cardiovascular: normal rate, normal S1 and S2 and no murmurs noted  Abdomen: obese, soft, non-tender, non-distended, normal bowel sounds  Extremities: no cyanosis, no clubbing and +2 edema bilateral lower extremities  Neurologic: awake, alert and speech normal    Assessment / Plan  #1 OLIVIA on CKD IIIa-IIIb              Cr 2.4 in ED w baseline ~1.1-1.7 per EMR              IVF - 3.5 L of IVF boluses and now on NS at 100 cc/hr                          Follow intake, output, daily weights to guard against overload                          UOP mediocre at 0.4-0.5 cc/kg/hr              Follow Cr with daily labs - 2.4, 2.3, 2.3, 1.5, 1.2, 1.1                          No real improvement - check renal US, consult nephro                          Nephro - inc IVF to 125 and supplement albumin     Do not restart amlodipine, lisinopril, or metformin on dc              Check UA - large leuk esterate / pos nitrite / many bacteria (see #3)   Renal US - unremarkable              FENa is 0.33%, c/w prerenal azotemia              Not on diuretic at home              Heparin for DVT ppx     #2 Acute hypoxic respiratory failure in the setting of COPD w continued tobacco abuse and morbid obesity with SHALA, now resolved              SpO2 87% on room air in ED; 97% on 3L now              CXR clear              BNP ~1000              Check CT chest (w/o contrast d/t OLIVIA) - pt too obese for CT              Check d-dimer - doubt VTE as pt on Eliquis - was <200              O2 as needed     #3 UTI vs bacteriuria              Does not meet septic criteria              Not symptomatic              UA large leuk esterate / pos nitrite / many bacteria              Urine cx sent from ED - NGTD              Currently on Rocephin IV and will de-escalate based on course / cultures    Started 9/19; ur cx NGTD; stop abx     #4 Weakness / fall              Dizziness, fell, struck head, on anticoagulation              CT head / cervical spine no acute pathology              Likely r/t #1              Fall precautions              PT OT                          BP too low for eval on 9/18                          Will await their evals - [facility]              ?SNF placement - [place]                #5 Atrial fibrillation on chronic anticoagulation              Telemetry monitoring              Continue home metoprolol for rate-control              Continue home Eliquis for anticoagulation          #6 NIDDM / \"pre-diabetes\"              MHZ on any home oral antihyperglycemics              BG 86 this AM              Reg diet              Check hemoglobin A1c to assess long-term glucose control - 5.6%     #7 HTN - continue home metoprolol     #8 HPL - continue home Lipitor     #9 GERD - continue home Protonix     Code status               Full  DVT prophylaxis       Eliquis  Disposition                To be determined - PT OT ordered - hopefully [facility]    Electronically signed by Valdez Sarabia DO on 9/22/2020 at 8:43 AM

## 2020-09-22 NOTE — PROGRESS NOTES
Physician Progress Note      Kai Rosen  CSN #:                  [id number]  :                       1950  ADMIT DATE:       2020 10:09 PM  [address] [place] DATE:  RESPONDING  PROVIDER #:        Kiel Vázquez DO          QUERY TEXT:    Dear Attending,    Patient admitted with OLIVIA. Noted documentation of UTI vs bacteriuria per IM. If possible, please document in progress notes and discharge summary:    The medical record reflects the following:  Risk Factors: advanced age  Clinical Indicators: UA WBC >20 and few bacteria, UC negative, per IM   \". ..UTI-currently asymptomatic.  UA+. Urine culture without growth so   far. .. UTI vs bacteriuria. Jameel Tico Jameel Tico Not symptomatic. ..UA large leuk esterate / pos   nitrite / many bacteria. Jameel Tico Jameel Tico \"  Treatment: IV Rocephin x4 doses    Thank you,  Ruba Galvan, RN, BSN, CDIS  Clinical Documentation Improvement  [email or web address]  Options provided:  -- UTI present as evidenced by, Please document evidence. -- UTI was ruled out  -- Other - I will add my own diagnosis  -- Disagree - Not applicable / Not valid  -- Disagree - Clinically unable to determine / Unknown  -- Refer to Clinical Documentation Reviewer    PROVIDER RESPONSE TEXT:    UTI was ruled out after study.     Query created by: Kanika Brantley on 2020 1:50 PM      Electronically signed by:  Kiel Vázquez DO 2020 1:57 PM

## 2020-09-22 NOTE — PROGRESS NOTES
Report given to Aliza Omalley at [place]. Informed that transport is here now to  pt. Asked for fax number to fax discharge papers, Aliza Omalley states to just send papers with pt.

## 2020-09-22 NOTE — CARE COORDINATION
Social Work/Discharge Planning:  Received call from Mazin Walker with [facility] stating patient is over facility weight limit of 450. [facility] states [facility] can accommodate 500 pounds. Informed Gabby that unit will confirm patient weight. Notified patient and she states she should weigh less than indicated amount since her surgery. Informed patient that RN to confirm her weight. Patient is agreeable to going to [facility], if she is over weight limit for [facility]. Will continue to follow. Electronically signed by NADIA Flanagan on 9/22/2020 at 10:03 AM     Addendum:  Notified liaison Mazin Walker that patient weighs 434 pounds. Mazin Walker states she confirm which facility can accept patient. Will continue to follow.   Electronically signed by NADIA Flanagan on 9/22/2020 at 10:41 AM

## 2020-09-23 LAB
BLOOD CULTURE, ROUTINE: NORMAL
CULTURE, BLOOD 2: NORMAL

## 2021-01-05 ENCOUNTER — HOSPITAL ENCOUNTER (EMERGENCY)
Age: 71
Discharge: HOME | End: 2021-01-05
Payer: COMMERCIAL

## 2021-01-05 ENCOUNTER — APPOINTMENT (OUTPATIENT)
Dept: CT IMAGING | Age: 71
End: 2021-01-05
Payer: COMMERCIAL

## 2021-01-05 ENCOUNTER — APPOINTMENT (OUTPATIENT)
Dept: GENERAL RADIOLOGY | Age: 71
End: 2021-01-05
Payer: COMMERCIAL

## 2021-01-05 VITALS
SYSTOLIC BLOOD PRESSURE: 147 MMHG | HEART RATE: 68 BPM | BODY MASS INDEX: 48.82 KG/M2 | TEMPERATURE: 96.9 F | RESPIRATION RATE: 16 BRPM | DIASTOLIC BLOOD PRESSURE: 87 MMHG | HEIGHT: 65 IN | WEIGHT: 293 LBS | OXYGEN SATURATION: 94 %

## 2021-01-05 DIAGNOSIS — R93.0 ABNORMAL CT SCAN OF HEAD: ICD-10-CM

## 2021-01-05 DIAGNOSIS — S69.92XA INJURY OF LEFT THUMB, INITIAL ENCOUNTER: ICD-10-CM

## 2021-01-05 DIAGNOSIS — S16.1XXA STRAIN OF NECK MUSCLE, INITIAL ENCOUNTER: ICD-10-CM

## 2021-01-05 DIAGNOSIS — M25.561 ACUTE PAIN OF RIGHT KNEE: ICD-10-CM

## 2021-01-05 DIAGNOSIS — S09.90XA CLOSED HEAD INJURY, INITIAL ENCOUNTER: Primary | ICD-10-CM

## 2021-01-05 DIAGNOSIS — S00.03XA HEMATOMA OF FRONTAL SCALP, INITIAL ENCOUNTER: ICD-10-CM

## 2021-01-05 PROCEDURE — 70450 CT HEAD/BRAIN W/O DYE: CPT

## 2021-01-05 PROCEDURE — 73130 X-RAY EXAM OF HAND: CPT

## 2021-01-05 PROCEDURE — 6370000000 HC RX 637 (ALT 250 FOR IP): Performed by: NURSE PRACTITIONER

## 2021-01-05 PROCEDURE — 72125 CT NECK SPINE W/O DYE: CPT

## 2021-01-05 PROCEDURE — 99283 EMERGENCY DEPT VISIT LOW MDM: CPT

## 2021-01-05 PROCEDURE — 73562 X-RAY EXAM OF KNEE 3: CPT

## 2021-01-05 RX ORDER — ACETAMINOPHEN 325 MG/1
650 TABLET ORAL ONCE
Status: COMPLETED | OUTPATIENT
Start: 2021-01-05 | End: 2021-01-05

## 2021-01-05 RX ORDER — FAMOTIDINE 20 MG/1
20 TABLET, FILM COATED ORAL 2 TIMES DAILY
COMMUNITY

## 2021-01-05 RX ADMIN — ACETAMINOPHEN 650 MG: 325 TABLET ORAL at 12:26

## 2021-01-05 ASSESSMENT — PAIN SCALES - GENERAL: PAINLEVEL_OUTOF10: 10

## 2021-01-05 NOTE — ED PROVIDER NOTES
[facility]  Department of Emergency Medicine   ED  Encounter Note  Admit Date/RoomTime: 2021 11:12 AM  ED Room: 33/33    NAME: Ida Manrique  : 1950  MRN: [id number]     Chief Complaint:  Fall (last night), Finger Pain (left thumb), Knee Pain (right), Neck Pain (and across schoulders), and Head Injury    History of Present Illness       Ida Manrique is a 79 y.o. old female who presents to the emergency department by private vehicle, for a mechanical fall which occured 1 day(s) prior to arrival. She was reportedly was letting her dogs out and bent over to connect them on the lesion when one tripped her and she fell forward smacking the front/top of her head while at home prior to incident with complaints of head injury, neck pain, left thumb and right knee pain. The patients tetanus status is within past 5 years. Since onset the symptoms have been stable. Her pain is aggraveated by pressure on or palpation of and relieved by nothing, as no treatment has been provided prior to this visit. She denies any loss of consciousness, chest pain, abdominal pain, numbness or weakness. She takes ASA. .  ROS   Pertinent positives and negatives are stated within HPI, all other systems reviewed and are negative. Past Medical History:  has a past medical history of Arthritis, Cerebral artery occlusion with cerebral infarction ([address] Cigarette nicotine dependence without complication, COPD (chronic obstructive pulmonary disease) ([address] Depression, Diabetes mellitus ([address] GERD (gastroesophageal reflux disease), Hyperlipidemia, Hypertension, Hypothyroidism, Obese, SHALA (obstructive sleep apnea), and Palate mass. Surgical History:  has a past surgical history that includes Hysterectomy; Foot surgery (Right, ); Appendectomy; Tonsillectomy; Colonoscopy; Endoscopy, colon, diagnostic; Carpal tunnel release (Bilateral); other surgical history (Left, 2014);  Foot surgery (2014); Hammer toe surgery (Left, 03/06/2017); and joint replacement (bilat knee). Social History:  reports that she has quit smoking. Her smoking use included cigarettes and cigars. She has never used smokeless tobacco. She reports that she does not drink alcohol or use drugs. Family History: family history includes Cancer in her brother, father, sister, and son; Heart Disease in her mother. Allergies: Food, Iodine, Darvocet [propoxyphene n-acetaminophen], Influenza vaccines, Lamictal [lamotrigine], Other, Pentazocine lactate, and Talwin [pentazocine]    Physical Exam   Oxygen Saturation Interpretation: Normal.        ED Triage Vitals [01/05/21 1120]   BP Temp Temp src Pulse Resp SpO2 Height Weight   (!) 151/93 -- -- 60 18 95 % 5' 5\" (1.651 m) (!) 390 lb (176.9 kg)         Physical Exam  Constitutional:  Alert, development consistent with age. HEENT:  NC/NT. Airway patent. Ecchymosis noted to the frontal aspect of the head  Neck:  No midline. Bilateral trapezi and paravertebral tenderness. Normal ROM. Supple. Chest:  Symmetrical without visible rash or tenderness. Respiratory:  Lungs Clear to auscultation and breath sounds equal.  CV:  Regular rate and rhythm, normal heart sounds, without pathological murmurs, ectopy, gallops, or rubs. GI:  Abdomen Soft, nontender, good bowel sounds. No firm or pulsatile mass. Pelvis:  Stable, nontender to palpation. Back:  No midline or paravertebral tenderness. No costovertebral tenderness. Extremities: Left thumb injury with ecchymosis by MCP. No erythema or warmth noted. Limited range of motion due to pain. Full range of motion of hand Cap refill less than 3 seconds. Negative snuffbox tenderness. Right knee pain with and tenderness palpation the anterior aspect. Full range of motion with pain. No deformity. Distal pulses are equal bilateral in upper extremities. Integument:  Normal turgor.   Warm, dry, without visible rash, unless noted elsewhere. Lymphatic: no lymphadenopathy noted  Neurological:  Oriented x3, GCS 15. Motor functions intact. Lab / Imaging Results   (All laboratory and radiology results have been personally reviewed by myself)  Labs:  No results found for this visit on 01/05/21. Imaging: All Radiology results interpreted by Radiologist unless otherwise noted. CT Head WO Contrast   Final Result   No intracranial hemorrhage. Small subcutaneous right frontal hematoma. Focal attenuating portion of the right middle cerebral artery. Please   correlate with physical exam for possible stroke. Further evaluation with CT   angiogram or MRA of the brain may be obtained if clinically warranted. CT Cervical Spine WO Contrast   Final Result   1. No fracture or acute osseous abnormality. No significant change. 2.  Multilevel degenerative changes. XR HAND LEFT (MIN 3 VIEWS)   Final Result   1. There is no fracture dislocation of the left hand   2. Findings of osteoarthritis. XR KNEE RIGHT (3 VIEWS)   Final Result   1. There is no fracture dislocation of the right knee. ED Course / Medical Decision Making     Medications   acetaminophen (TYLENOL) tablet 650 mg (650 mg Oral Given 1/5/21 1226)        Re-examination:  1/5/21     Time: 1325-reevaluated patient. Patient has no neurological deficits. Discussed with patient abnormal CT scan. She states he is appoint with her PCP on Thursday for further evaluation. She states she will discuss this abnormal incidental result with him. Patient accepts Ace wrap to right knee. She states she uses a walker and will continue using it. She has no neurological deficits or compromise. Patients symptoms are improving. Consult(s):   None    Procedure(s):   none    MDM:   Patient presents to the ED for fall causing head strike, neck pain, left thumb injury and right knee pain.  Differential diagnoses included but not limited to closed head injury versus intracranial hemorrhage versus hematoma. Fracture versus dislocation versus strain. Workup in the ED revealed CT of the head without contrast.  No intracranial hemorrhage. There is a small subcutaneous right frontal hematoma. Incidental finding included focal donating portion of the right middle cerebral artery. She has no neurological deficits. Patient was seen independently but reviewed imaging with Dr. Agrawal Devoid he feels that she has no neurological deficits to follow-up with her PCP for further evaluation. She states she is appointment with her PCP on Thursday. CT of the cervical spine revealed no acute fracture or acute osseous abnormality. No significant change. Multilevel degenerative changes. XR of the left hand revealed no fracture dislocation of the left hand. There is findings of osteoarthritis noted. X-ray right knee revealed there is no fracture or dislocation of the right knee. Patient has full range of motion of all extremities but some pain with the right knee and left thumb. There is no neurovascular compromise. Patient uses a walker at all times. Ace wrap was placed on the right knee after shared decision making. Patient unable to use crutches. Patient was given Tylenol for their symptoms with good improvement. Patient continues to be non-toxic on re-evaluation. Findings were discussed with the patient and reasons to immediately return to the ED were articulated to them. They will follow-up with their PMD and orthopedics. Plan of Care/Counseling:  I reviewed today's visit with the patient in addition to providing specific details for the plan of care and counseling regarding the diagnosis and prognosis. Questions are answered at this time and are agreeable with the plan. Assessment      1. Closed head injury, initial encounter    2. Acute pain of right knee    3. Injury of left thumb, initial encounter    4. Hematoma of frontal scalp, initial encounter    5.  Strain of neck muscle, initial encounter    6. Abnormal CT scan of head      Plan   Discharged home. Patient condition is stable    New Medications     Discharge Medication List as of 1/5/2021  1:30 PM        Electronically signed by BRENDA Rutherford CNP   DD: 1/5/21  **This report was transcribed using voice recognition software. Every effort was made to ensure accuracy; however, inadvertent computerized transcription errors may be present.   END OF ED PROVIDER NOTE        BRENDA Rutherford CNP  01/05/21 1207

## 2021-06-03 ENCOUNTER — HOSPITAL ENCOUNTER (EMERGENCY)
Age: 71
Discharge: HOME | End: 2021-06-03
Attending: EMERGENCY MEDICINE
Payer: COMMERCIAL

## 2021-06-03 VITALS
HEIGHT: 65 IN | RESPIRATION RATE: 18 BRPM | TEMPERATURE: 97.8 F | HEART RATE: 74 BPM | OXYGEN SATURATION: 96 % | WEIGHT: 293 LBS | BODY MASS INDEX: 48.82 KG/M2 | DIASTOLIC BLOOD PRESSURE: 61 MMHG | SYSTOLIC BLOOD PRESSURE: 116 MMHG

## 2021-06-03 DIAGNOSIS — R53.1 GENERALIZED WEAKNESS: Primary | ICD-10-CM

## 2021-06-03 LAB
ALBUMIN SERPL-MCNC: 3.7 G/DL (ref 3.5–5.2)
ALP BLD-CCNC: 101 U/L (ref 35–104)
ALT SERPL-CCNC: 7 U/L (ref 0–32)
ANION GAP SERPL CALCULATED.3IONS-SCNC: 6 MMOL/L (ref 7–16)
AST SERPL-CCNC: 10 U/L (ref 0–31)
BASOPHILS ABSOLUTE: 0.04 E9/L (ref 0–0.2)
BASOPHILS RELATIVE PERCENT: 0.5 % (ref 0–2)
BILIRUB SERPL-MCNC: 0.7 MG/DL (ref 0–1.2)
BUN BLDV-MCNC: 16 MG/DL (ref 6–23)
CALCIUM SERPL-MCNC: 9.8 MG/DL (ref 8.6–10.2)
CHLORIDE BLD-SCNC: 103 MMOL/L (ref 98–107)
CO2: 28 MMOL/L (ref 22–29)
CREAT SERPL-MCNC: 1.2 MG/DL (ref 0.5–1)
EOSINOPHILS ABSOLUTE: 0.24 E9/L (ref 0.05–0.5)
EOSINOPHILS RELATIVE PERCENT: 3 % (ref 0–6)
GFR AFRICAN AMERICAN: 54
GFR NON-AFRICAN AMERICAN: 44 ML/MIN/1.73
GLUCOSE BLD-MCNC: 116 MG/DL (ref 74–99)
HCT VFR BLD CALC: 44.1 % (ref 34–48)
HEMOGLOBIN: 14.5 G/DL (ref 11.5–15.5)
IMMATURE GRANULOCYTES #: 0.02 E9/L
IMMATURE GRANULOCYTES %: 0.2 % (ref 0–5)
LACTIC ACID: 0.9 MMOL/L (ref 0.5–2.2)
LIPASE: 21 U/L (ref 13–60)
LYMPHOCYTES ABSOLUTE: 2.24 E9/L (ref 1.5–4)
LYMPHOCYTES RELATIVE PERCENT: 27.9 % (ref 20–42)
MCH RBC QN AUTO: 31 PG (ref 26–35)
MCHC RBC AUTO-ENTMCNC: 32.9 % (ref 32–34.5)
MCV RBC AUTO: 94.2 FL (ref 80–99.9)
MONOCYTES ABSOLUTE: 0.58 E9/L (ref 0.1–0.95)
MONOCYTES RELATIVE PERCENT: 7.2 % (ref 2–12)
NEUTROPHILS ABSOLUTE: 4.91 E9/L (ref 1.8–7.3)
NEUTROPHILS RELATIVE PERCENT: 61.2 % (ref 43–80)
PDW BLD-RTO: 13.4 FL (ref 11.5–15)
PLATELET # BLD: 213 E9/L (ref 130–450)
PMV BLD AUTO: 11 FL (ref 7–12)
POTASSIUM REFLEX MAGNESIUM: 4.4 MMOL/L (ref 3.5–5)
RBC # BLD: 4.68 E12/L (ref 3.5–5.5)
SODIUM BLD-SCNC: 137 MMOL/L (ref 132–146)
TOTAL PROTEIN: 7 G/DL (ref 6.4–8.3)
WBC # BLD: 8 E9/L (ref 4.5–11.5)

## 2021-06-03 PROCEDURE — 99284 EMERGENCY DEPT VISIT MOD MDM: CPT

## 2021-06-03 PROCEDURE — 80053 COMPREHEN METABOLIC PANEL: CPT

## 2021-06-03 PROCEDURE — 83605 ASSAY OF LACTIC ACID: CPT

## 2021-06-03 PROCEDURE — 83690 ASSAY OF LIPASE: CPT

## 2021-06-03 PROCEDURE — 85025 COMPLETE CBC W/AUTO DIFF WBC: CPT

## 2021-06-03 ASSESSMENT — ENCOUNTER SYMPTOMS
ABDOMINAL PAIN: 0
CHEST TIGHTNESS: 0
ANAL BLEEDING: 0
VOMITING: 0
SHORTNESS OF BREATH: 0
NAUSEA: 0
BLOOD IN STOOL: 1
SORE THROAT: 0
BACK PAIN: 0
PHOTOPHOBIA: 0
RHINORRHEA: 0

## 2021-06-03 ASSESSMENT — PAIN SCALES - GENERAL: PAINLEVEL_OUTOF10: 0

## 2021-06-03 NOTE — ED NOTES
[facility] CONTACT ASSESSMENT NOTE       Department of Emergency Medicine   6/3/21  6:43 PM EDT    Chief Complaint: Melena (x3-4 days, denies thinners) and Fatigue    History of Present Illness:   Germain Henson is a 70 y.o. female who presents to the ED for dark stools that she noticed 3 or 4 days ago. Patient is not on anticoagulation. She does report some abdominal pain and fatigue. She denies nausea or vomiting. Focused Physical Exam:  VS:  /64   Pulse 74   Temp 97.4 °F (36.3 °C) (Tympanic)   Resp 16   SpO2 97%      General: Alert and in no apparent distress     Medical History:  has a past medical history of Arthritis, Cerebral artery occlusion with cerebral infarction ([address] Cigarette nicotine dependence without complication, COPD (chronic obstructive pulmonary disease) ([address] Depression, Diabetes mellitus ([address] GERD (gastroesophageal reflux disease), Hyperlipidemia, Hypertension, Hypothyroidism, Obese, SHALA (obstructive sleep apnea), and Palate mass. Surgical History:  has a past surgical history that includes Hysterectomy; Foot surgery (Right, 2009); Appendectomy; Tonsillectomy; Colonoscopy; Endoscopy, colon, diagnostic; Carpal tunnel release (Bilateral); other surgical history (Left, 11/5/2014); Foot surgery (2014); Hammer toe surgery (Left, 03/06/2017); and joint replacement (bilat knee). Social History:  reports that she has quit smoking. Her smoking use included cigarettes and cigars. She has never used smokeless tobacco. She reports that she does not drink alcohol and does not use drugs. Family History: family history includes Cancer in her brother, father, sister, and son; Heart Disease in her mother.     *ALLERGIES*     Food, Iodine, Darvocet [propoxyphene n-acetaminophen], Influenza vaccines, Lamictal [lamotrigine], Other, Pentazocine lactate, and Talwin [pentazocine]     Initial Plan of Care:  Initiate Treatment-Testing, Proceed toTreatment Area When Bed Available for ED Attending/MLP to Continue Care    -----------------END OF FIRST PROVIDER CONTACT ASSESSMENT NOTE--------------  Electronically signed by Héctor Reyna PA-C   DD: 6/3/21         Héctor Reyna PA-C  06/03/21 1844

## 2021-06-04 NOTE — ED NOTES
Discharge instructions discussed with patient. Follow up care reviewed. Questions and concerns addressed. Patient signs discharge form.       Gracie Esposito RN  06/03/21 3979

## 2021-06-04 NOTE — ED PROVIDER NOTES
Patient is a 71-year-old female with a history of obesity, COPD, diabetes, paroxysmal atrial fibrillation not on any blood thinners that presents emergency department for evaluation of melena and fatigue. Patient states that for the last 4 days she has been noticing her stools becoming gradually darker and black in color. Nothing seems to make it better or worse. It has been intermittent and severe. Patient states she is never had this happen before. She has gradually had increasing weakness since that time. She is not on any blood thinners however does state that she takes an aspirin daily. Last colonoscopy was within the last year and patient states was normal.  She denies lightheadedness, dizziness, chest pain, shortness of breath, abdominal pain, nausea, vomiting. The history is provided by the patient. Review of Systems   Constitutional: Positive for appetite change and fatigue. Negative for chills and fever. HENT: Negative for congestion, nosebleeds, rhinorrhea and sore throat. Eyes: Negative for photophobia and visual disturbance. Respiratory: Negative for chest tightness and shortness of breath. Cardiovascular: Positive for leg swelling. Negative for chest pain and palpitations. Gastrointestinal: Positive for blood in stool. Negative for abdominal pain, anal bleeding, nausea and vomiting. Positive for melena   Genitourinary: Negative for flank pain and hematuria. Musculoskeletal: Negative for back pain, gait problem and myalgias. Skin: Negative for pallor, rash and wound. Neurological: Negative for dizziness, light-headedness and headaches. Physical Exam  Vitals and nursing note reviewed. Constitutional:       General: She is not in acute distress. Appearance: Normal appearance. She is obese. She is not ill-appearing or diaphoretic. HENT:      Head: Normocephalic and atraumatic. Eyes:      Extraocular Movements: Extraocular movements intact. detail with the resident and provided the instruction and education regarding the evidence-based evaluation and treatment of melena. History: Patient with 3 days of dark stool as well as increasing fatigue. Shortness of breath with exertion. My findings: Hung Mejia is a 70 y.o. female whom is in no distress. Physical exam reveals she is alert and oriented. Heart is regular, lungs are coarse. Abdomen is obese, soft nontender peer extremities are intact. Good distal pulse and capillary refill. My plan: Symptomatic and supportive care. Labs. Electronically signed by Ridge Page DO on 6/3/21 at 7:48 PM EDT          [TG]      ED Course User Index  [TG] Ridge Page DO       --------------------------------------------- PAST HISTORY ---------------------------------------------  Past Medical History:  has a past medical history of Arthritis, Cerebral artery occlusion with cerebral infarction ([facility] Cigarette nicotine dependence without complication, COPD (chronic obstructive pulmonary disease) ([facility] Depression, Diabetes mellitus ([facility] [facility] GERD (gastroesophageal reflux disease), Hyperlipidemia, Hypertension, Hypothyroidism, Obese, SHALA (obstructive sleep apnea), and Palate mass. Past Surgical History:  has a past surgical history that includes Hysterectomy; Foot surgery (Right, 2009); Appendectomy; Tonsillectomy; Colonoscopy; Endoscopy, colon, diagnostic; Carpal tunnel release (Bilateral); other surgical history (Left, 11/5/2014); Foot surgery (2014); Hammer toe surgery (Left, 03/06/2017); and joint replacement (bilat knee). Social History:  reports that she has quit smoking. Her smoking use included cigarettes and cigars. She has never used smokeless tobacco. She reports that she does not drink alcohol and does not use drugs. Family History: family history includes Cancer in her brother, father, sister, and son; Heart Disease in her mother.      The patients home medications have been 0.9 0.5 - 2.2 mmol/L       Radiology:  No orders to display       ------------------------- NURSING NOTES AND VITALS REVIEWED ---------------------------  Date / Time Roomed:  6/3/2021  6:44 PM  ED Bed Assignment:  06/06    The nursing notes within the ED encounter and vital signs as below have been reviewed. /64   Pulse 74   Temp 97.4 °F (36.3 °C) (Tympanic)   Resp 16   Ht 5' 5\" (1.651 m)   Wt (!) 390 lb (176.9 kg)   SpO2 97%   BMI 64.90 kg/m²   Oxygen Saturation Interpretation: Normal      ------------------------------------------ PROGRESS NOTES ------------------------------------------  8:10 PM EDT  I have spoken with the patient and discussed todays results, in addition to providing specific details for the plan of care and counseling regarding the diagnosis and prognosis. Their questions are answered at this time and they are agreeable with the plan. I discussed at length with them reasons for immediate return here for re evaluation. They will followup with their primary care physician by calling their office tomorrow. --------------------------------- ADDITIONAL PROVIDER NOTES ---------------------------------  At this time the patient is without objective evidence of an acute process requiring hospitalization or inpatient management. They have remained hemodynamically stable throughout their entire ED visit and are stable for discharge with outpatient follow-up. The plan has been discussed in detail and they are aware of the specific conditions for emergent return, as well as the importance of follow-up. New Prescriptions    No medications on file       Diagnosis:  1. Generalized weakness        Disposition:  Patient's disposition: Discharge to home  Patient's condition is stable.        Ayaan Sanches., DO  Resident  06/03/21 7480

## 2021-06-04 NOTE — ED NOTES
Dr. Aixa Cuellar in with patient and this nurse for a rectal exam at this time. Patient tolerates well. Exam complete.       Ave Mann RN  06/03/21 2010

## 2021-11-23 ENCOUNTER — OFFICE VISIT (OUTPATIENT)
Dept: CARDIOLOGY CLINIC | Age: 71
End: 2021-11-23
Payer: COMMERCIAL

## 2021-11-23 VITALS
RESPIRATION RATE: 12 BRPM | DIASTOLIC BLOOD PRESSURE: 68 MMHG | SYSTOLIC BLOOD PRESSURE: 102 MMHG | BODY MASS INDEX: 48.82 KG/M2 | HEIGHT: 65 IN | WEIGHT: 293 LBS | HEART RATE: 82 BPM

## 2021-11-23 DIAGNOSIS — Z01.810 PREOP CARDIOVASCULAR EXAM: ICD-10-CM

## 2021-11-23 DIAGNOSIS — I48.0 PAF (PAROXYSMAL ATRIAL FIBRILLATION) (HCC): Primary | ICD-10-CM

## 2021-11-23 DIAGNOSIS — I10 ESSENTIAL HYPERTENSION: ICD-10-CM

## 2021-11-23 PROCEDURE — G8427 DOCREV CUR MEDS BY ELIG CLIN: HCPCS | Performed by: INTERNAL MEDICINE

## 2021-11-23 PROCEDURE — G8400 PT W/DXA NO RESULTS DOC: HCPCS | Performed by: INTERNAL MEDICINE

## 2021-11-23 PROCEDURE — 1036F TOBACCO NON-USER: CPT | Performed by: INTERNAL MEDICINE

## 2021-11-23 PROCEDURE — 3017F COLORECTAL CA SCREEN DOC REV: CPT | Performed by: INTERNAL MEDICINE

## 2021-11-23 PROCEDURE — 1123F ACP DISCUSS/DSCN MKR DOCD: CPT | Performed by: INTERNAL MEDICINE

## 2021-11-23 PROCEDURE — 99215 OFFICE O/P EST HI 40 MIN: CPT | Performed by: INTERNAL MEDICINE

## 2021-11-23 PROCEDURE — 1090F PRES/ABSN URINE INCON ASSESS: CPT | Performed by: INTERNAL MEDICINE

## 2021-11-23 PROCEDURE — G8484 FLU IMMUNIZE NO ADMIN: HCPCS | Performed by: INTERNAL MEDICINE

## 2021-11-23 PROCEDURE — 93000 ELECTROCARDIOGRAM COMPLETE: CPT | Performed by: INTERNAL MEDICINE

## 2021-11-23 PROCEDURE — 4040F PNEUMOC VAC/ADMIN/RCVD: CPT | Performed by: INTERNAL MEDICINE

## 2021-11-23 PROCEDURE — G8417 CALC BMI ABV UP PARAM F/U: HCPCS | Performed by: INTERNAL MEDICINE

## 2021-11-23 RX ORDER — APIXABAN 5 MG/1
5 TABLET, FILM COATED ORAL 2 TIMES DAILY
Qty: 180 TABLET | Refills: 3 | Status: SHIPPED | OUTPATIENT
Start: 2021-11-23

## 2021-11-23 RX ORDER — APIXABAN 5 MG/1
5 TABLET, FILM COATED ORAL 2 TIMES DAILY
COMMUNITY
Start: 2021-11-01 | End: 2021-11-23 | Stop reason: SDUPTHER

## 2021-11-23 RX ORDER — AMLODIPINE BESYLATE 5 MG/1
5 TABLET ORAL DAILY
COMMUNITY
Start: 2021-11-03

## 2021-11-23 RX ORDER — ACETAMINOPHEN 500 MG
500 TABLET ORAL EVERY 6 HOURS PRN
COMMUNITY

## 2021-11-23 RX ORDER — CYCLOBENZAPRINE HCL 10 MG
10 TABLET ORAL 3 TIMES DAILY PRN
COMMUNITY

## 2021-11-23 NOTE — PROGRESS NOTES
Patient Active Problem List   Diagnosis    SHALA on CPAP    Obesity hypoventilation syndrome ([facility]    COPD (chronic obstructive pulmonary disease) ([facility]    Hyperlipidemia LDL goal <100    Morbid obesity with BMI of 45.0-49.9, adult ([facility]    Acquired hypothyroidism    DM (diabetes mellitus), type 2 ([facility]    Acute on chronic respiratory failure with hypoxia and hypercapnia ([facility])    PAF (paroxysmal atrial fibrillation) ([facility]    Open abdominal wall wound    Pre-syncope    Hypotension due to hypovolemia    Intractable vomiting with nausea    Acute cystitis with hematuria    Acute renal failure (ARF) ([facility])    Closed head injury       Current Outpatient Medications   Medication Sig Dispense Refill    amLODIPine (NORVASC) 5 MG tablet       cyclobenzaprine (FLEXERIL) 10 MG tablet Take 10 mg by mouth 3 times daily as needed      acetaminophen (TYLENOL) 500 MG tablet Take 500 mg by mouth every 6 hours as needed for Pain      ELIQUIS 5 MG TABS tablet Take 1 tablet by mouth 2 times daily 180 tablet 3    famotidine (PEPCID) 20 MG tablet Take 20 mg by mouth 2 times daily      ferrous sulfate (IRON 325) 325 (65 Fe) MG tablet Take 325 mg by mouth daily (with breakfast)      levothyroxine (SYNTHROID) 200 MCG tablet Take 1 tablet by mouth daily 30 tablet 0    albuterol (PROVENTIL) (2.5 MG/3ML) 0.083% nebulizer solution Take 2.5 mg by nebulization daily as needed for Wheezing      fluticasone-vilanterol (BREO ELLIPTA) 100-25 MCG/INH AEPB inhaler Inhale 1 puff into the lungs daily      fluticasone (FLONASE) 50 MCG/ACT nasal spray 1 spray by Each Nostril route daily as needed for Rhinitis      vitamin D (CHOLECALCIFEROL) 1000 UNIT TABS tablet Take 1,000 Units by mouth daily      metoprolol tartrate (LOPRESSOR) 25 MG tablet Take 1 tablet by mouth 2 times daily Take morning of surgery with a sip of water 180 tablet 3    atorvastatin (LIPITOR) 40 MG tablet Take 40 mg by mouth daily      cetirizine (ZYRTEC) 5 MG file   Other Topics Concern    Not on file   Social History Narrative    Not on file     Social Determinants of Health     Financial Resource Strain:     Difficulty of Paying Living Expenses: Not on file   Food Insecurity:     Worried About [address] in the Last Year: Not on file    Keisha of Food in the Last Year: Not on file   Transportation Needs:     Lack of Transportation (Medical): Not on file    Lack of Transportation (Non-Medical): Not on file   Physical Activity:     Days of Exercise per Week: Not on file    Minutes of Exercise per Session: Not on file   Stress:     Feeling of Stress : Not on file   Social Connections:     Frequency of Communication with Friends and Family: Not on file    Frequency of Social Gatherings with Friends and Family: Not on file    Attends Confucianist Services: Not on file    Active Member of [address] [place] [address] or Organizations: Not on file    Attends Club or Organization Meetings: Not on file    Marital Status: Not on file   Intimate Partner Violence:     Fear of Current or Ex-Partner: Not on file    Emotionally Abused: Not on file    Physically Abused: Not on file    Sexually Abused: Not on file   Housing Stability:     Unable to Pay for Housing in the Last Year: Not on file    Number of [place] in the Last Year: Not on file    Unstable Housing in the Last Year: Not on file       Family History   Problem Relation Age of Onset    Heart Disease Mother     Cancer Father     Cancer Sister     Cancer Brother     Cancer Son        Past Medical History:   Diagnosis Date    Arthritis     Cerebral artery occlusion with cerebral infarction [facility]) 1998?    tia     Cigarette nicotine dependence without complication 7/31/0983    COPD (chronic obstructive pulmonary disease) ([facility]     Depression 10/2014    \"doing ok\".   anxiety    Diabetes mellitus ([facility]     prediabetic    GERD (gastroesophageal reflux disease)     Hyperlipidemia     Hypertension     Hypothyroidism     Obese 10/2014    states weight aprox. 400 lbs.  SHALA (obstructive sleep apnea)     no treatment    Palate mass        PHYSICAL EXAM:  CONSTITUTIONAL:  Well developed, well nourished    Vitals:    11/23/21 1053   BP: 102/68   Pulse: 82   Resp: 12   Weight: (!) 349 lb (158.3 kg)   Height: 5' 5\" (1.651 m)     HEAD & FACE: Normocephalic. Symmetric. EYES: No xanthelasma. Conjunctivae not injected. EARS, NOSE, MOUTH & THROAT: No oral pallor or cyanosis. NECK: No JVD at 30 degrees. No thyromegaly. RESPIRATORY: Clear to auscultation and percussion in all fields. No use of accessory muscle or intercostal retractions. CARDIOVASCULAR: Irregularly irregular. No carotid bruits. Abdominal aorta not enlarged. Femoral arteries without bruits. Pedal pulses 1+ 2+ bilateral pretibial.   edema. ABDOMEN: Soft without hepatic or splenic enlargement. No tenderness. MUSCULOSKELETAL: No kyphosis or scoliosis of the back. Good muscle strength and tone. No muscle atrophy. Slow gait and inability to undergo exercise stress testing. EXTREMITIES: No clubbing or cyanosis. SKIN: No Xanthomas or ulcerations. NEUROLOGIC: Oriented to time, place and person. Normal mood and affect. LYMPHATIC:  No palpable neck or supraclavicular nodes. No splenomegaly. EKG: the EKG tracing was reviewed and found to reveal: Atrial fibrillation. QTc 354 ms. No change compared to prior tracing. ASSESSMENT:                                                     ORDERS:       Diagnosis Orders   1. PAF (paroxysmal atrial fibrillation) ([facility])  EKG 12 lead   2. Essential hypertension  EKG 12 lead   3. Preop cardiovascular exam       Above assessment cardiac issues stable. PLAN:   See above orders. Medication reconciliation completed. Creatinine 1.2 on 6/3/2021  Old records were reviewed and found to reveal:   Discussed issues that would prompt earlier evaluation.   Letter of preoperative risk stratification dictated to [facility] at [facility] in [facility]  Same cardiac medications. Follow-up office visit in 1 year.

## 2021-11-29 ENCOUNTER — TELEPHONE (OUTPATIENT)
Dept: ADMINISTRATIVE | Age: 71
End: 2021-11-29

## 2021-11-29 NOTE — TELEPHONE ENCOUNTER
Pt calling - she was going to schedule her toe amputation surgery and Dr. Sanford Gong said they have not received her clearance letter - can you please fax too: [phone]. Thank you.

## 2021-12-27 ENCOUNTER — APPOINTMENT (OUTPATIENT)
Dept: GENERAL RADIOLOGY | Age: 71
DRG: 177 | End: 2021-12-27
Payer: COMMERCIAL

## 2021-12-27 ENCOUNTER — HOSPITAL ENCOUNTER (INPATIENT)
Age: 71
LOS: 4 days | Discharge: NURSING, REHAB OR LONG-TERM CARE | DRG: 177 | End: 2021-12-31
Attending: STUDENT IN AN ORGANIZED HEALTH CARE EDUCATION/TRAINING PROGRAM | Admitting: INTERNAL MEDICINE
Payer: COMMERCIAL

## 2021-12-27 DIAGNOSIS — J12.82 PNEUMONIA DUE TO COVID-19 VIRUS: ICD-10-CM

## 2021-12-27 DIAGNOSIS — U07.1 PNEUMONIA DUE TO COVID-19 VIRUS: ICD-10-CM

## 2021-12-27 DIAGNOSIS — J96.01 ACUTE RESPIRATORY FAILURE WITH HYPOXIA (HCC): Primary | ICD-10-CM

## 2021-12-27 LAB
ALBUMIN SERPL-MCNC: 3.5 G/DL (ref 3.5–5.2)
ALP BLD-CCNC: 95 U/L (ref 35–104)
ALT SERPL-CCNC: 11 U/L (ref 0–32)
ANION GAP SERPL CALCULATED.3IONS-SCNC: 11 MMOL/L (ref 7–16)
AST SERPL-CCNC: 21 U/L (ref 0–31)
BASOPHILS ABSOLUTE: 0.01 E9/L (ref 0–0.2)
BASOPHILS RELATIVE PERCENT: 0.1 % (ref 0–2)
BILIRUB SERPL-MCNC: 0.4 MG/DL (ref 0–1.2)
BUN BLDV-MCNC: 15 MG/DL (ref 6–23)
CALCIUM SERPL-MCNC: 9.4 MG/DL (ref 8.6–10.2)
CHLORIDE BLD-SCNC: 103 MMOL/L (ref 98–107)
CO2: 25 MMOL/L (ref 22–29)
CREAT SERPL-MCNC: 1.1 MG/DL (ref 0.5–1)
D DIMER: 330 NG/ML DDU
EKG ATRIAL RATE: 88 BPM
EKG Q-T INTERVAL: 314 MS
EKG QRS DURATION: 78 MS
EKG QTC CALCULATION (BAZETT): 415 MS
EKG R AXIS: 26 DEGREES
EKG T AXIS: 95 DEGREES
EKG VENTRICULAR RATE: 105 BPM
EOSINOPHILS ABSOLUTE: 0 E9/L (ref 0.05–0.5)
EOSINOPHILS RELATIVE PERCENT: 0 % (ref 0–6)
GFR AFRICAN AMERICAN: 59
GFR NON-AFRICAN AMERICAN: 49 ML/MIN/1.73
GLUCOSE BLD-MCNC: 96 MG/DL (ref 74–99)
HCT VFR BLD CALC: 48.1 % (ref 34–48)
HEMOGLOBIN: 15.8 G/DL (ref 11.5–15.5)
IMMATURE GRANULOCYTES #: 0.03 E9/L
IMMATURE GRANULOCYTES %: 0.4 % (ref 0–5)
INFLUENZA A BY PCR: NOT DETECTED
INFLUENZA B BY PCR: NOT DETECTED
LYMPHOCYTES ABSOLUTE: 1.51 E9/L (ref 1.5–4)
LYMPHOCYTES RELATIVE PERCENT: 22.6 % (ref 20–42)
MCH RBC QN AUTO: 30.2 PG (ref 26–35)
MCHC RBC AUTO-ENTMCNC: 32.8 % (ref 32–34.5)
MCV RBC AUTO: 92 FL (ref 80–99.9)
MONOCYTES ABSOLUTE: 0.37 E9/L (ref 0.1–0.95)
MONOCYTES RELATIVE PERCENT: 5.5 % (ref 2–12)
NEUTROPHILS ABSOLUTE: 4.75 E9/L (ref 1.8–7.3)
NEUTROPHILS RELATIVE PERCENT: 71.4 % (ref 43–80)
PDW BLD-RTO: 13 FL (ref 11.5–15)
PLATELET # BLD: 163 E9/L (ref 130–450)
PMV BLD AUTO: 10.5 FL (ref 7–12)
POTASSIUM REFLEX MAGNESIUM: 4.1 MMOL/L (ref 3.5–5)
PRO-BNP: 642 PG/ML (ref 0–125)
RBC # BLD: 5.23 E12/L (ref 3.5–5.5)
SARS-COV-2, NAAT: DETECTED
SODIUM BLD-SCNC: 139 MMOL/L (ref 132–146)
TOTAL PROTEIN: 7.6 G/DL (ref 6.4–8.3)
TROPONIN, HIGH SENSITIVITY: 9 NG/L (ref 0–9)
WBC # BLD: 6.7 E9/L (ref 4.5–11.5)

## 2021-12-27 PROCEDURE — 93005 ELECTROCARDIOGRAM TRACING: CPT | Performed by: PHYSICIAN ASSISTANT

## 2021-12-27 PROCEDURE — 36415 COLL VENOUS BLD VENIPUNCTURE: CPT

## 2021-12-27 PROCEDURE — 99285 EMERGENCY DEPT VISIT HI MDM: CPT

## 2021-12-27 PROCEDURE — 93010 ELECTROCARDIOGRAM REPORT: CPT | Performed by: INTERNAL MEDICINE

## 2021-12-27 PROCEDURE — 87635 SARS-COV-2 COVID-19 AMP PRB: CPT

## 2021-12-27 PROCEDURE — 71045 X-RAY EXAM CHEST 1 VIEW: CPT

## 2021-12-27 PROCEDURE — 83880 ASSAY OF NATRIURETIC PEPTIDE: CPT

## 2021-12-27 PROCEDURE — 93005 ELECTROCARDIOGRAM TRACING: CPT | Performed by: STUDENT IN AN ORGANIZED HEALTH CARE EDUCATION/TRAINING PROGRAM

## 2021-12-27 PROCEDURE — 87502 INFLUENZA DNA AMP PROBE: CPT

## 2021-12-27 PROCEDURE — 6360000002 HC RX W HCPCS: Performed by: STUDENT IN AN ORGANIZED HEALTH CARE EDUCATION/TRAINING PROGRAM

## 2021-12-27 PROCEDURE — 80053 COMPREHEN METABOLIC PANEL: CPT

## 2021-12-27 PROCEDURE — 85025 COMPLETE CBC W/AUTO DIFF WBC: CPT

## 2021-12-27 PROCEDURE — 96374 THER/PROPH/DIAG INJ IV PUSH: CPT

## 2021-12-27 PROCEDURE — 85378 FIBRIN DEGRADE SEMIQUANT: CPT

## 2021-12-27 PROCEDURE — 84484 ASSAY OF TROPONIN QUANT: CPT

## 2021-12-27 PROCEDURE — 1200000000 HC SEMI PRIVATE

## 2021-12-27 RX ORDER — PANTOPRAZOLE SODIUM 40 MG/1
40 TABLET, DELAYED RELEASE ORAL
Status: DISCONTINUED | OUTPATIENT
Start: 2021-12-28 | End: 2021-12-31 | Stop reason: HOSPADM

## 2021-12-27 RX ORDER — SODIUM CHLORIDE 9 MG/ML
25 INJECTION, SOLUTION INTRAVENOUS PRN
Status: DISCONTINUED | OUTPATIENT
Start: 2021-12-27 | End: 2021-12-31 | Stop reason: HOSPADM

## 2021-12-27 RX ORDER — CYCLOBENZAPRINE HCL 10 MG
10 TABLET ORAL 3 TIMES DAILY PRN
Status: DISCONTINUED | OUTPATIENT
Start: 2021-12-27 | End: 2021-12-31 | Stop reason: HOSPADM

## 2021-12-27 RX ORDER — BUDESONIDE AND FORMOTEROL FUMARATE DIHYDRATE 80; 4.5 UG/1; UG/1
2 AEROSOL RESPIRATORY (INHALATION) 2 TIMES DAILY
Status: DISCONTINUED | OUTPATIENT
Start: 2021-12-27 | End: 2021-12-31 | Stop reason: HOSPADM

## 2021-12-27 RX ORDER — ALBUTEROL SULFATE 2.5 MG/3ML
2.5 SOLUTION RESPIRATORY (INHALATION) EVERY 6 HOURS PRN
Status: DISCONTINUED | OUTPATIENT
Start: 2021-12-27 | End: 2021-12-31 | Stop reason: HOSPADM

## 2021-12-27 RX ORDER — DEXAMETHASONE 4 MG/1
6 TABLET ORAL DAILY
Status: DISCONTINUED | OUTPATIENT
Start: 2021-12-28 | End: 2021-12-31 | Stop reason: HOSPADM

## 2021-12-27 RX ORDER — ASCORBIC ACID 500 MG
500 TABLET ORAL 3 TIMES DAILY
Status: DISCONTINUED | OUTPATIENT
Start: 2021-12-27 | End: 2021-12-31 | Stop reason: HOSPADM

## 2021-12-27 RX ORDER — POLYETHYLENE GLYCOL 3350 17 G/17G
17 POWDER, FOR SOLUTION ORAL DAILY PRN
Status: DISCONTINUED | OUTPATIENT
Start: 2021-12-27 | End: 2021-12-31 | Stop reason: HOSPADM

## 2021-12-27 RX ORDER — SENNA AND DOCUSATE SODIUM 50; 8.6 MG/1; MG/1
1 TABLET, FILM COATED ORAL 2 TIMES DAILY
Status: DISCONTINUED | OUTPATIENT
Start: 2021-12-27 | End: 2021-12-31 | Stop reason: HOSPADM

## 2021-12-27 RX ORDER — VITAMIN B COMPLEX
6000 TABLET ORAL DAILY
Status: DISCONTINUED | OUTPATIENT
Start: 2021-12-27 | End: 2021-12-31 | Stop reason: HOSPADM

## 2021-12-27 RX ORDER — LEVOTHYROXINE SODIUM 0.1 MG/1
200 TABLET ORAL DAILY
Status: DISCONTINUED | OUTPATIENT
Start: 2021-12-28 | End: 2021-12-31 | Stop reason: HOSPADM

## 2021-12-27 RX ORDER — AMLODIPINE BESYLATE 5 MG/1
5 TABLET ORAL DAILY
Status: DISCONTINUED | OUTPATIENT
Start: 2021-12-28 | End: 2021-12-31 | Stop reason: HOSPADM

## 2021-12-27 RX ORDER — ATORVASTATIN CALCIUM 40 MG/1
40 TABLET, FILM COATED ORAL DAILY
Status: DISCONTINUED | OUTPATIENT
Start: 2021-12-27 | End: 2021-12-31 | Stop reason: HOSPADM

## 2021-12-27 RX ORDER — SODIUM CHLORIDE 0.9 % (FLUSH) 0.9 %
5-40 SYRINGE (ML) INJECTION PRN
Status: DISCONTINUED | OUTPATIENT
Start: 2021-12-27 | End: 2021-12-31 | Stop reason: HOSPADM

## 2021-12-27 RX ORDER — ONDANSETRON 2 MG/ML
4 INJECTION INTRAMUSCULAR; INTRAVENOUS EVERY 6 HOURS PRN
Status: DISCONTINUED | OUTPATIENT
Start: 2021-12-27 | End: 2021-12-31 | Stop reason: HOSPADM

## 2021-12-27 RX ORDER — ONDANSETRON 4 MG/1
4 TABLET, ORALLY DISINTEGRATING ORAL EVERY 8 HOURS PRN
Status: DISCONTINUED | OUTPATIENT
Start: 2021-12-27 | End: 2021-12-31 | Stop reason: HOSPADM

## 2021-12-27 RX ORDER — GUAIFENESIN/DEXTROMETHORPHAN 100-10MG/5
5 SYRUP ORAL EVERY 4 HOURS PRN
Status: DISCONTINUED | OUTPATIENT
Start: 2021-12-27 | End: 2021-12-31 | Stop reason: HOSPADM

## 2021-12-27 RX ORDER — FLUTICASONE PROPIONATE 50 MCG
1 SPRAY, SUSPENSION (ML) NASAL DAILY PRN
Status: DISCONTINUED | OUTPATIENT
Start: 2021-12-27 | End: 2021-12-31 | Stop reason: HOSPADM

## 2021-12-27 RX ORDER — ASPIRIN 81 MG/1
81 TABLET, CHEWABLE ORAL DAILY
Status: DISCONTINUED | OUTPATIENT
Start: 2021-12-27 | End: 2021-12-31 | Stop reason: HOSPADM

## 2021-12-27 RX ORDER — VITAMIN B COMPLEX
2000 TABLET ORAL DAILY
Status: DISCONTINUED | OUTPATIENT
Start: 2022-01-03 | End: 2021-12-31 | Stop reason: HOSPADM

## 2021-12-27 RX ORDER — SODIUM CHLORIDE 0.9 % (FLUSH) 0.9 %
5-40 SYRINGE (ML) INJECTION EVERY 12 HOURS SCHEDULED
Status: DISCONTINUED | OUTPATIENT
Start: 2021-12-27 | End: 2021-12-31 | Stop reason: HOSPADM

## 2021-12-27 RX ORDER — DOCUSATE SODIUM 100 MG/1
100 CAPSULE, LIQUID FILLED ORAL 2 TIMES DAILY
Status: DISCONTINUED | OUTPATIENT
Start: 2021-12-27 | End: 2021-12-31 | Stop reason: HOSPADM

## 2021-12-27 RX ORDER — DEXAMETHASONE SODIUM PHOSPHATE 10 MG/ML
10 INJECTION INTRAMUSCULAR; INTRAVENOUS ONCE
Status: COMPLETED | OUTPATIENT
Start: 2021-12-27 | End: 2021-12-27

## 2021-12-27 RX ADMIN — DEXAMETHASONE SODIUM PHOSPHATE 10 MG: 10 INJECTION INTRAMUSCULAR; INTRAVENOUS at 16:26

## 2021-12-27 ASSESSMENT — ENCOUNTER SYMPTOMS
SHORTNESS OF BREATH: 1
COUGH: 1
GASTROINTESTINAL NEGATIVE: 1
EYES NEGATIVE: 1
ALLERGIC/IMMUNOLOGIC NEGATIVE: 1

## 2021-12-27 NOTE — ED PROVIDER NOTES
FIRST PROVIDER CONTACT ASSESSMENT NOTE           Department of Emergency Medicine                 First Provider Note            21  1:41 PM EST    Date of Encounter: No admission date for patient encounter. Patient Name: Carlo Arthur  : 1950  MRN: [id number]    Chief Complaint: Shortness of Breath (x2 wks becoming worse today) and Cough      History of Present Illness:   Carlo Arthur is a 70 y.o. female who presents to the ED for SOB, cough. Patient has a history of obstructive sleep apnea and COPD. She is a type II diabetic. Has not been vaccinated for COVID-19. No known exposure. Reports being very short of breath. Oxygen on room air was 88% on arrival        Focused Physical Exam:  VS:    ED Triage Vitals [21 1339]   BP Temp Temp Source Pulse Resp SpO2 Height Weight   127/67 97 °F (36.1 °C) Temporal 92 24 (!) 88 % 5' 5\" (1.651 m) (!) 368 lb (166.9 kg)        Physical Ex: Constitutional: Alert and non-toxic. Medical History:  has a past medical history of Arthritis, Cerebral artery occlusion with cerebral infarction ([address] Cigarette nicotine dependence without complication, COPD (chronic obstructive pulmonary disease) ([address] Depression, Diabetes mellitus ([address] GERD (gastroesophageal reflux disease), Hyperlipidemia, Hypertension, Hypothyroidism, Obese, SHALA (obstructive sleep apnea), and Palate mass. Surgical History:  has a past surgical history that includes Hysterectomy; Foot surgery (Right, ); Appendectomy; Tonsillectomy; Colonoscopy; Endoscopy, colon, diagnostic; Carpal tunnel release (Bilateral); other surgical history (Left, 2014); Foot surgery (); Hammer toe surgery (Left, 2017); and joint replacement (bilat knee). Social History:  reports that she has quit smoking. Her smoking use included cigarettes and cigars. She has never used smokeless tobacco. She reports that she does not drink alcohol and does not use drugs.   Family History: family history includes Cancer in her brother, father, sister, and son; Heart Disease in her mother.     Allergies: Food, Iodine, Darvocet [propoxyphene n-acetaminophen], Influenza vaccines, Lamictal [lamotrigine], Other, Pentazocine lactate, Pneumococcal vaccines, Propoxyphene, Cephalexin, and Talwin [pentazocine]     Initial Plan of Care: Initiate Treatment-Testing, Proceed toTreatment Area When Bed Available for ED Attending/MLP to Continue Care      ---END OF FIRST PROVIDER CONTACT ASSESSMENT NOTE---  Electronically signed by Calixto Marie PA-C   DD: 12/27/21       Calixto Marie PA-C  12/27/21 [id number]

## 2021-12-27 NOTE — ED NOTES
Pt was taken off oxygen and went from 96% to 90%, Dr. Vinicio Rosa notified of this.      Ramesh Stapleton RN  12/27/21 [id number]

## 2021-12-27 NOTE — ED PROVIDER NOTES
Department of Emergency Medicine   ED  Provider Note  Admit Date/RoomTime: 12/27/2021  3:07 PM  ED Room: [place] [address]          History of Present Illness:  12/27/21, Time: 3:16 PM EST  Chief Complaint   Patient presents with    Shortness of Breath     x2 wks becoming worse today    Cough Romana Pay is a 70 y.o. female presenting to the ED for shortness of breath for 3 weeks. The patient states she has been coughing. Is a productive cough. Nothing is better or worse. The patient denies any chills but has had subjective fevers at home. She is short of breath with exertion. Denies any nausea or emesis. Denies any leg swelling. Denies any history of DVT or PE. Nothing makes better or worse it is constant duration. Her symptoms are moderate to severe in intensity. Denies any paroxysmal nocturnal dyspnea and orthopnea. She is not vaccinated for Covid or the flu as she is \"allergic to the flu shot and does not want to take the chance of the Covid shot. \"    Review of Systems:  Review of systems obtained and negative unless stated otherwise above in the HPI.    --------------------------------------------- PAST HISTORY ---------------------------------------------  Past Medical History:  has a past medical history of Arthritis, Cerebral artery occlusion with cerebral infarction ([facility] [address] Cigarette nicotine dependence without complication, COPD (chronic obstructive pulmonary disease) ([facility] [address] Depression, Diabetes mellitus ([facility] [address] GERD (gastroesophageal reflux disease), Hyperlipidemia, Hypertension, Hypothyroidism, Obese, SHALA (obstructive sleep apnea), and Palate mass. Past Surgical History:  has a past surgical history that includes Hysterectomy; Foot surgery (Right, 2009); Appendectomy; Tonsillectomy; Colonoscopy; Endoscopy, colon, diagnostic; Carpal tunnel release (Bilateral); other surgical history (Left, 11/5/2014); Foot surgery (2014);  Hammer toe surgery (Left, 03/06/2017); and joint replacement (bilat knee). Social History:  reports that she has quit smoking. Her smoking use included cigarettes and cigars. She has never used smokeless tobacco. She reports that she does not drink alcohol and does not use drugs. Family History: family history includes Cancer in her brother, father, sister, and son; Heart Disease in her mother. . Unless otherwise noted, family history is non contributory    The patients home medications have been reviewed. Allergies: Food, Iodine, Darvocet [propoxyphene n-acetaminophen], Influenza vaccines, Lamictal [lamotrigine], Other, Pentazocine lactate, Pneumococcal vaccines, Propoxyphene, Cephalexin, and Talwin [pentazocine]    I have reviewed the past medical history, past surgical history, social history, and family history    ---------------------------------------------------PHYSICAL EXAM--------------------------------------    Constitutional: Appears short of breath, patient is obese  Head: Normocephalic, atraumatic  Eyes: Non-icteric slcera, no conjunctival injection  ENT: Moist mucous membranes,  Neck: Trachea midline, no JVD  Respiratory: Labored respirations, bibasilar crackles, no wheeze or rhonchi  Cardiovascular: Regular rate. Regular rhythm. No murmurs, no gallops, no rubs. Gastrointestinal: Abdomen Soft, Non tender, Non distended. No rebound tenderness, guarding, or rigidity. Extremities: No lower extremity edema  Genitourinary: No CVA tenderness, no suprapubic tenderness  Musculoskeletal: Moves all extremities, no deformity  Skin: Pink, warm, dry without rash. Neurologic: Alert, symmetric facies, no aphasia    -------------------------------------------------- RESULTS -------------------------------------------------  I have personally reviewed all laboratory and imaging results for this patient. Results are listed below.      LABS: (Lab results interpreted by me)  Results for orders placed or performed during the hospital encounter of 12/27/21   COVID-19, Rapid    Specimen: Nasopharyngeal Swab   Result Value Ref Range    SARS-CoV-2, NAAT DETECTED (A) Not Detected   Rapid influenza A/B antigens    Specimen: Nares   Result Value Ref Range    Influenza A by PCR Not Detected Not Detected    Influenza B by PCR Not Detected Not Detected   CBC Auto Differential   Result Value Ref Range    WBC 6.7 4.5 - 11.5 E9/L    RBC 5.23 3.50 - 5.50 E12/L    Hemoglobin 15.8 (H) 11.5 - 15.5 g/dL    Hematocrit 48.1 (H) 34.0 - 48.0 %    MCV 92.0 80.0 - 99.9 fL    MCH 30.2 26.0 - 35.0 pg    MCHC 32.8 32.0 - 34.5 %    RDW 13.0 11.5 - 15.0 fL    Platelets 177 844 - 842 E9/L    MPV 10.5 7.0 - 12.0 fL    Neutrophils % 71.4 43.0 - 80.0 %    Immature Granulocytes % 0.4 0.0 - 5.0 %    Lymphocytes % 22.6 20.0 - 42.0 %    Monocytes % 5.5 2.0 - 12.0 %    Eosinophils % 0.0 0.0 - 6.0 %    Basophils % 0.1 0.0 - 2.0 %    Neutrophils Absolute 4.75 1.80 - 7.30 E9/L    Immature Granulocytes # 0.03 E9/L    Lymphocytes Absolute 1.51 1.50 - 4.00 E9/L    Monocytes Absolute 0.37 0.10 - 0.95 E9/L    Eosinophils Absolute 0.00 (L) 0.05 - 0.50 E9/L    Basophils Absolute 0.01 0.00 - 0.20 E9/L   Comprehensive Metabolic Panel w/ Reflex to MG   Result Value Ref Range    Sodium 139 132 - 146 mmol/L    Potassium reflex Magnesium 4.1 3.5 - 5.0 mmol/L    Chloride 103 98 - 107 mmol/L    CO2 25 22 - 29 mmol/L    Anion Gap 11 7 - 16 mmol/L    Glucose 96 74 - 99 mg/dL    BUN 15 6 - 23 mg/dL    CREATININE 1.1 (H) 0.5 - 1.0 mg/dL    GFR Non-African American 49 >=60 mL/min/1.73    GFR African American 59     Calcium 9.4 8.6 - 10.2 mg/dL    Total Protein 7.6 6.4 - 8.3 g/dL    Albumin 3.5 3.5 - 5.2 g/dL    Total Bilirubin 0.4 0.0 - 1.2 mg/dL    Alkaline Phosphatase 95 35 - 104 U/L    ALT 11 0 - 32 U/L    AST 21 0 - 31 U/L   Troponin   Result Value Ref Range    Troponin, High Sensitivity 9 0 - 9 ng/L   Brain Natriuretic Peptide   Result Value Ref Range    Pro- (H) 0 - 125 pg/mL   D-dimer, quantitative   Result Value Ref Range D-Dimer, Quant 330 ng/mL DDU   EKG 12 Lead   Result Value Ref Range    Ventricular Rate 105 BPM    Atrial Rate 88 BPM    QRS Duration 78 ms    Q-T Interval 314 ms    QTc Calculation (Bazett) 415 ms    R Axis 26 degrees    T Axis 95 degrees   ,       RADIOLOGY:  Interpreted by Radiologist unless otherwise specified  XR CHEST PORTABLE   Final Result   Developing pneumonia suggested in the left lower lung.               ------------------------- NURSING NOTES AND VITALS REVIEWED ---------------------------   The nursing notes within the ED encounter and vital signs as below have been reviewed by myself  /67   Pulse 103   Temp 97 °F (36.1 °C) (Temporal)   Resp 18   Ht 5' 5\" (1.651 m)   Wt (!) 368 lb (166.9 kg)   SpO2 90% Comment: drop from 96 to 90 just turning off oxygen  BMI 61.24 kg/m²     Oxygen Saturation Interpretation: [Abnormal but improved after treatment    The patients available past medical records and past encounters were reviewed. ------------------------------ ED COURSE/MEDICAL DECISION MAKING----------------------  Medications   dexamethasone (DECADRON) injection 10 mg (10 mg IntraVENous Given 12/27/21 1626)        Re-Evaluations: This patient's ED course included:a personal history and physicial examination    This patient has remained hemodynamically stable during their ED course. Consultations:  Internal medicine    Medical Decision Making:   Patient presents emerge department shortness of breath    Vital signs interpreted myself as tachypneic and hypoxic. History and physical examination findings consistent with Covid pneumonia. The patient's Covid test is positive. We will perform lab studies to evaluate for complicating factors as well as pulmonary embolism. The patient is is placed on supplemental oxygen. Labs/imaging: D-dimer is elevated. Labs otherwise unremarkable for any acute significant abnormality.     EKG:  EKG is interpreted myself as a ventricular rate of 105 bpm there is an irregularly irregular rhythm QRS durations within normal is QT/QTc is normal.  No ST segment elevation or depression menstruation. There is no T wave inversions noted. This EKG is interpreted by myself as atrial fibrillation with mild rapid ventricular rate at 105. Otherwise no evidence of ischemia or infarct. Critical Care:  Upon my evaluation, this patient had a high probability of imminent or life-threatening deterioration due to hypoxic respiratory failure secondary to Covid pneumonia requiring supplemental oxygenation, which required my direct attention, intervention, and personal management. I have personally provided 35 minutes of critical care time excluding time spent on separately billable procedures. Time includes review of laboratory data, radiology results, discussion with consultants, and monitoring for potential decompensation. Interventions were performed as documented above. Counseling: The emergency provider has spoken with the patient and discussed todays results, in addition to providing specific details for the plan of care and counseling regarding the diagnosis and prognosis. Questions are answered at this time and they are agreeable with the plan.       --------------------------------- IMPRESSION AND DISPOSITION ---------------------------------    IMPRESSION  1. Acute respiratory failure with hypoxia ([address]    2. Pneumonia due to COVID-19 virus        DISPOSITION  Disposition: Admit to med/surg floor  Patient condition is stable    Dr. Derek LARSEN, am the primary provider of record    Derek Vivas DO  Emergency Medicine    NOTE: This report was transcribed using voice recognition software.  Every effort was made to ensure accuracy; however, inadvertent computerized transcription errors may be present         Casey Honeycutt DO  12/27/21 [id number]

## 2021-12-27 NOTE — ED NOTES
Pt ambulated on 2L NC at 95%, pt was SOB while walking.  Notified Dr. Delaney Sanderson of this     [facility] SANTO  12/27/21 1651

## 2021-12-28 LAB
ALBUMIN SERPL-MCNC: 3 G/DL (ref 3.5–5.2)
ALP BLD-CCNC: 77 U/L (ref 35–104)
ALT SERPL-CCNC: 8 U/L (ref 0–32)
ANION GAP SERPL CALCULATED.3IONS-SCNC: 8 MMOL/L (ref 7–16)
AST SERPL-CCNC: 14 U/L (ref 0–31)
BASOPHILS ABSOLUTE: 0 E9/L (ref 0–0.2)
BASOPHILS RELATIVE PERCENT: 0 % (ref 0–2)
BILIRUB SERPL-MCNC: 0.3 MG/DL (ref 0–1.2)
BUN BLDV-MCNC: 17 MG/DL (ref 6–23)
C-REACTIVE PROTEIN: 9.3 MG/DL (ref 0–0.4)
CALCIUM SERPL-MCNC: 9.1 MG/DL (ref 8.6–10.2)
CHLORIDE BLD-SCNC: 105 MMOL/L (ref 98–107)
CO2: 24 MMOL/L (ref 22–29)
CREAT SERPL-MCNC: 0.9 MG/DL (ref 0.5–1)
D DIMER: 335 NG/ML DDU
EOSINOPHILS ABSOLUTE: 0 E9/L (ref 0.05–0.5)
EOSINOPHILS RELATIVE PERCENT: 0 % (ref 0–6)
FERRITIN: 805 NG/ML
FIBRINOGEN: >700 MG/DL (ref 225–540)
GFR AFRICAN AMERICAN: >60
GFR NON-AFRICAN AMERICAN: >60 ML/MIN/1.73
GLUCOSE BLD-MCNC: 140 MG/DL (ref 74–99)
HBA1C MFR BLD: 5.7 % (ref 4–5.6)
HCT VFR BLD CALC: 44.3 % (ref 34–48)
HEMOGLOBIN: 14.6 G/DL (ref 11.5–15.5)
IMMATURE GRANULOCYTES #: 0.02 E9/L
IMMATURE GRANULOCYTES %: 0.7 % (ref 0–5)
LACTATE DEHYDROGENASE: 214 U/L (ref 135–214)
LYMPHOCYTES ABSOLUTE: 0.75 E9/L (ref 1.5–4)
LYMPHOCYTES RELATIVE PERCENT: 28.1 % (ref 20–42)
MCH RBC QN AUTO: 30.3 PG (ref 26–35)
MCHC RBC AUTO-ENTMCNC: 33 % (ref 32–34.5)
MCV RBC AUTO: 91.9 FL (ref 80–99.9)
METER GLUCOSE: 161 MG/DL (ref 74–99)
MONOCYTES ABSOLUTE: 0.14 E9/L (ref 0.1–0.95)
MONOCYTES RELATIVE PERCENT: 5.2 % (ref 2–12)
NEUTROPHILS ABSOLUTE: 1.76 E9/L (ref 1.8–7.3)
NEUTROPHILS RELATIVE PERCENT: 66 % (ref 43–80)
PDW BLD-RTO: 12.7 FL (ref 11.5–15)
PLATELET # BLD: 159 E9/L (ref 130–450)
PMV BLD AUTO: 10.4 FL (ref 7–12)
POTASSIUM REFLEX MAGNESIUM: 4.2 MMOL/L (ref 3.5–5)
PROCALCITONIN: 0.14 NG/ML (ref 0–0.08)
RBC # BLD: 4.82 E12/L (ref 3.5–5.5)
SODIUM BLD-SCNC: 137 MMOL/L (ref 132–146)
TOTAL PROTEIN: 6.7 G/DL (ref 6.4–8.3)
VITAMIN D 25-HYDROXY: 28 NG/ML (ref 30–100)
WBC # BLD: 2.7 E9/L (ref 4.5–11.5)

## 2021-12-28 PROCEDURE — 94660 CPAP INITIATION&MGMT: CPT

## 2021-12-28 PROCEDURE — 99232 SBSQ HOSP IP/OBS MODERATE 35: CPT | Performed by: INTERNAL MEDICINE

## 2021-12-28 PROCEDURE — 6360000002 HC RX W HCPCS: Performed by: NURSE PRACTITIONER

## 2021-12-28 PROCEDURE — 80053 COMPREHEN METABOLIC PANEL: CPT

## 2021-12-28 PROCEDURE — 82962 GLUCOSE BLOOD TEST: CPT

## 2021-12-28 PROCEDURE — 85378 FIBRIN DEGRADE SEMIQUANT: CPT

## 2021-12-28 PROCEDURE — 6370000000 HC RX 637 (ALT 250 FOR IP): Performed by: INTERNAL MEDICINE

## 2021-12-28 PROCEDURE — 85025 COMPLETE CBC W/AUTO DIFF WBC: CPT

## 2021-12-28 PROCEDURE — 83036 HEMOGLOBIN GLYCOSYLATED A1C: CPT

## 2021-12-28 PROCEDURE — 99222 1ST HOSP IP/OBS MODERATE 55: CPT | Performed by: FAMILY MEDICINE

## 2021-12-28 PROCEDURE — 82306 VITAMIN D 25 HYDROXY: CPT

## 2021-12-28 PROCEDURE — XW0DXM6 INTRODUCTION OF BARICITINIB INTO MOUTH AND PHARYNX, EXTERNAL APPROACH, NEW TECHNOLOGY GROUP 6: ICD-10-PCS | Performed by: FAMILY MEDICINE

## 2021-12-28 PROCEDURE — 6370000000 HC RX 637 (ALT 250 FOR IP): Performed by: NURSE PRACTITIONER

## 2021-12-28 PROCEDURE — 82728 ASSAY OF FERRITIN: CPT

## 2021-12-28 PROCEDURE — 86140 C-REACTIVE PROTEIN: CPT

## 2021-12-28 PROCEDURE — 2580000003 HC RX 258: Performed by: NURSE PRACTITIONER

## 2021-12-28 PROCEDURE — 83615 LACTATE (LD) (LDH) ENZYME: CPT

## 2021-12-28 PROCEDURE — 1200000000 HC SEMI PRIVATE

## 2021-12-28 PROCEDURE — 84145 PROCALCITONIN (PCT): CPT

## 2021-12-28 PROCEDURE — 85384 FIBRINOGEN ACTIVITY: CPT

## 2021-12-28 RX ADMIN — OXYCODONE HYDROCHLORIDE AND ACETAMINOPHEN 500 MG: 500 TABLET ORAL at 13:53

## 2021-12-28 RX ADMIN — OXYCODONE HYDROCHLORIDE AND ACETAMINOPHEN 500 MG: 500 TABLET ORAL at 10:56

## 2021-12-28 RX ADMIN — AMLODIPINE BESYLATE 5 MG: 5 TABLET ORAL at 10:57

## 2021-12-28 RX ADMIN — PANTOPRAZOLE SODIUM 40 MG: 40 TABLET, DELAYED RELEASE ORAL at 10:58

## 2021-12-28 RX ADMIN — BARICITINIB 4 MG: 2 TABLET, FILM COATED ORAL at 10:59

## 2021-12-28 RX ADMIN — ASPIRIN 81 MG CHEWABLE TABLET 81 MG: 81 TABLET CHEWABLE at 10:57

## 2021-12-28 RX ADMIN — Medication 10 ML: at 12:25

## 2021-12-28 RX ADMIN — APIXABAN 5 MG: 5 TABLET, FILM COATED ORAL at 20:21

## 2021-12-28 RX ADMIN — OXYCODONE HYDROCHLORIDE AND ACETAMINOPHEN 500 MG: 500 TABLET ORAL at 20:21

## 2021-12-28 RX ADMIN — Medication 10 ML: at 20:23

## 2021-12-28 RX ADMIN — DEXAMETHASONE 6 MG: 4 TABLET ORAL at 10:58

## 2021-12-28 RX ADMIN — BUDESONIDE AND FORMOTEROL FUMARATE DIHYDRATE 2 PUFF: 80; 4.5 AEROSOL RESPIRATORY (INHALATION) at 12:20

## 2021-12-28 RX ADMIN — METOPROLOL TARTRATE 25 MG: 25 TABLET, FILM COATED ORAL at 10:57

## 2021-12-28 RX ADMIN — METOPROLOL TARTRATE 25 MG: 25 TABLET, FILM COATED ORAL at 20:21

## 2021-12-28 RX ADMIN — ENOXAPARIN SODIUM 160 MG: 100 INJECTION SUBCUTANEOUS at 11:00

## 2021-12-28 RX ADMIN — BUDESONIDE AND FORMOTEROL FUMARATE DIHYDRATE 2 PUFF: 80; 4.5 AEROSOL RESPIRATORY (INHALATION) at 20:25

## 2021-12-28 RX ADMIN — ATORVASTATIN CALCIUM 40 MG: 40 TABLET, FILM COATED ORAL at 10:57

## 2021-12-28 RX ADMIN — Medication 6000 UNITS: at 12:20

## 2021-12-28 ASSESSMENT — PAIN DESCRIPTION - PROGRESSION: CLINICAL_PROGRESSION: NOT CHANGED

## 2021-12-28 ASSESSMENT — PAIN DESCRIPTION - ONSET: ONSET: ON-GOING

## 2021-12-28 ASSESSMENT — PAIN SCALES - GENERAL
PAINLEVEL_OUTOF10: 0
PAINLEVEL_OUTOF10: 3

## 2021-12-28 ASSESSMENT — PAIN DESCRIPTION - ORIENTATION: ORIENTATION: LOWER

## 2021-12-28 ASSESSMENT — PAIN DESCRIPTION - DESCRIPTORS: DESCRIPTORS: ACHING

## 2021-12-28 ASSESSMENT — PAIN - FUNCTIONAL ASSESSMENT: PAIN_FUNCTIONAL_ASSESSMENT: ACTIVITIES ARE NOT PREVENTED

## 2021-12-28 ASSESSMENT — PAIN DESCRIPTION - LOCATION: LOCATION: BACK

## 2021-12-28 ASSESSMENT — PAIN DESCRIPTION - FREQUENCY: FREQUENCY: INTERMITTENT

## 2021-12-28 ASSESSMENT — PAIN DESCRIPTION - PAIN TYPE: TYPE: CHRONIC PAIN

## 2021-12-28 NOTE — PROGRESS NOTES
Admission database completed to best of this RN's ability. Care plan and education initiated. Pt uses [facility] and [address] [place] [address] for aid service. Uses a motorized WC, walker and canes at home. CPAP at night.

## 2021-12-28 NOTE — H&P
[facility] History and Physical      CHIEF COMPLAINT:      Dyspnea    History of Present Illness:      Ms. Jessica Fuentes presented to the ED with dyspnea and productive cough that has been ongoing for the past three weeks. She recently discharged from [facility] three weeks ago for amputation of a hammer toe from her left foot. After discharge she found out her daughter, who drove her to and from the hospital, tested positive for COVID. Her symptoms of cough and dyspnea started about three weeks ago, and one week ago lost her appetite and has not eaten since. Around that time she started with cold sweats as well. Today she was driven to the hospital by her grandson when her dyspnea was unbearable and tested positive in the ED. She notified her grandson of positive test.  Her SPO2 in the ED was 88% and she was placed on 2L. She will be admitted for further management of COVID respiratory failure. Informant(s) for H&P: Patient        REVIEW OF SYSTEMS:  Review of Systems   Constitutional: Positive for activity change, appetite change and chills. HENT: Negative. Eyes: Negative. Respiratory: Positive for cough and shortness of breath. Cardiovascular: Negative. Gastrointestinal: Negative. Endocrine: Negative. Genitourinary: Negative. Musculoskeletal: Negative. Skin: Negative. Allergic/Immunologic: Negative. Neurological: Negative. Hematological: Negative. PMH:  Past Medical History:   Diagnosis Date    Arthritis     Cerebral artery occlusion with cerebral infarction ([address] 1998?    tia     Cigarette nicotine dependence without complication 4/09/4230    COPD (chronic obstructive pulmonary disease) ([address]     Depression 10/2014    \"doing ok\". anxiety    Diabetes mellitus ([address]     prediabetic    GERD (gastroesophageal reflux disease)     Hyperlipidemia     Hypertension     Hypothyroidism     Obese 10/2014    states weight aprox. 400 lbs.  SHALA (obstructive sleep apnea)     no treatment    Palate mass        Surgical History:  Past Surgical History:   Procedure Laterality Date    APPENDECTOMY      CARPAL TUNNEL RELEASE Bilateral     17 years ago    COLONOSCOPY      ENDOSCOPY, COLON, DIAGNOSTIC      FOOT SURGERY Right 2009    FOOT SURGERY  2014    reconstructive for hammer toes all four, not big toe left foot    HAMMER TOE SURGERY Left 03/06/2017    HYSTERECTOMY      JOINT REPLACEMENT  bilat knee    OTHER SURGICAL HISTORY Left 11/5/2014    hammer toe correction 2-4left foot and hardware;left calcaneous x2,flexor digital longis repair    TONSILLECTOMY         Medications Prior to Admission:    Prior to Admission medications    Medication Sig Start Date End Date Taking?  Authorizing Provider   amLODIPine (NORVASC) 5 MG tablet  11/3/21   Historical Provider, MD   cyclobenzaprine (FLEXERIL) 10 MG tablet Take 10 mg by mouth 3 times daily as needed    Historical Provider, MD   acetaminophen (TYLENOL) 500 MG tablet Take 500 mg by mouth every 6 hours as needed for Pain    Historical Provider, MD   ELIQUIS 5 MG TABS tablet Take 1 tablet by mouth 2 times daily 11/23/21   Kathy Coppola MD   famotidine (PEPCID) 20 MG tablet Take 20 mg by mouth 2 times daily    Historical Provider, MD   ferrous sulfate (IRON 325) 325 (65 Fe) MG tablet Take 325 mg by mouth daily (with breakfast)    Historical Provider, MD   levothyroxine (SYNTHROID) 200 MCG tablet Take 1 tablet by mouth daily 9/22/20   BRENDA Garcia - CNP   albuterol (PROVENTIL) (2.5 MG/3ML) 0.083% nebulizer solution Take 2.5 mg by nebulization daily as needed for Wheezing    Historical Provider, MD   fluticasone-vilanterol (BREO ELLIPTA) 100-25 MCG/INH AEPB inhaler Inhale 1 puff into the lungs daily    Historical Provider, MD   fluticasone (FLONASE) 50 MCG/ACT nasal spray 1 spray by Each Nostril route daily as needed for Rhinitis    Historical Provider, MD   vitamin D (CHOLECALCIFEROL) 1000 UNIT TABS tablet Take 1,000 Units by mouth daily    Historical Provider, MD   metoprolol tartrate (LOPRESSOR) 25 MG tablet Take 1 tablet by mouth 2 times daily Take morning of surgery with a sip of water 11/26/18   ELIJAH Mrea   atorvastatin (LIPITOR) 40 MG tablet Take 40 mg by mouth daily    Historical Provider, MD   cetirizine (ZYRTEC) 5 MG tablet Take 10 mg by mouth daily     Historical Provider, MD   docusate sodium (COLACE) 100 MG capsule Take 100 mg by mouth 2 times daily     Historical Provider, MD   aspirin 81 MG tablet Take 81 mg by mouth daily LD 3/7/18 per surgeon    Historical Provider, MD       Allergies:    Food, Iodine, Darvocet [propoxyphene n-acetaminophen], Influenza vaccines, Lamictal [lamotrigine], Other, Pentazocine lactate, Pneumococcal vaccines, Propoxyphene, Cephalexin, and Talwin [pentazocine]    Social History:    reports that she has quit smoking. Her smoking use included cigarettes and cigars. She has never used smokeless tobacco. She reports that she does not drink alcohol and does not use drugs. Family History:   family history includes Cancer in her brother, father, sister, and son; Heart Disease in her mother. PHYSICAL EXAM:  Vitals:  /68   Pulse 98   Temp 97 °F (36.1 °C) (Temporal)   Resp 16   Ht 5' 5\" (1.651 m)   Wt (!) 368 lb (166.9 kg)   SpO2 94%   BMI 61.24 kg/m²     General Appearance: Alert and oriented to person, place and time. Dyspneic with conversation  Skin: Warm and dry  Head: Normocephalic and atraumatic  Eyes: Pupils equal, round, and reactive to light, extraocular eye movements intact, conjunctivae normal  Neck: Supple and non tender   Pulmonary/Chest: Clear to auscultation bilaterally. Normal air movement, dyspneic with conversation. On 2L NC  Cardiovascular: Irregular rhythm  Abdomen: Soft, non-tender, non-distended, normal bowel sounds. No peritoneal signs.  No rigidity, rebound, or guarding  Extremities: Amputations of toe from left foot. Neurologic: Clear speech, no facial asymmetry. LABS:  Recent Labs     12/27/21  1350      K 4.1      CO2 25   BUN 15   CREATININE 1.1*   GLUCOSE 96   CALCIUM 9.4       Recent Labs     12/27/21  1350   WBC 6.7   RBC 5.23   HGB 15.8*   HCT 48.1*   MCV 92.0   MCH 30.2   MCHC 32.8   RDW 13.0      MPV 10.5       Radiology:   XR CHEST PORTABLE   Final Result   Developing pneumonia suggested in the left lower lung. EKG:     Atrial fibrillation with rapid ventricular response  Low voltage QRS  Cannot rule out Anterior infarct , age undetermined  Abnormal ECG  When compared with ECG of 17-SEP-2020 23:57,  Minimal criteria for Anterior infarct are now present      ASSESSMENT:      Active Problems:   COVID  Resolved Problems:    * No resolved hospital problems. *      PLAN:    1. COVID  - Start decadron daily  - Start vitamin C, D  - PRN robitussin  - Therapeutic lovenox - hx afib  - Encourage proning  - Consult Pharmacy to guide therapeutics- CRP ordered  - Send sputum, procalcitonin, strep legionella ag    2. Hx Afib  - Therapeutic lovenox  - Continue home lopressor    3. Hx COPD  - Resume home flonase, breo  - PRN albuterol    4. Hx TIA  - Resume home ASA    5. Hx HTN  - Resume home norvasc, lopressor    6. Hx HLP  - Resume home statin    7. Hx SHALA  - CPAP as at home    8. Hx Hypothyroid  - Resume home synthroid    Code Status: Full  DVT prophylaxis: Therapeutic Lovenox  GI Prophylaxis: Pantoprazole  Nutrition: Carb controlled, cardiac  Bowel Regimen: Colace, senna, glycolax  Activity: Up as tolerated, PT OT      NOTE: This report was transcribed using voice recognition software. Every effort was made to ensure accuracy; however, inadvertent computerized transcription errors may be present.   Electronically signed by BRENDA Sanders CNP on 12/27/2021 at 7:27 PM

## 2021-12-28 NOTE — PROGRESS NOTES
>Allen Pandey consulted pharmacy to guide COVID therapeutic course. Pt at 2L O2 and testing COVID+ 12-27 but symptoms for a couple of weeks. Inflammatory markers ordered but not yet reported. Pharmacy clinical staff will wait until markers back and decide on course at that point.   Thank you, Nika Ardon

## 2021-12-28 NOTE — PROGRESS NOTES
[facility] Hospitalist   Progress Note    Admitting Date and Time: 12/27/2021  3:07 PM  Admit Dx: Acute respiratory failure with hypoxia ([address] [J96.01]  Pneumonia due to COVID-19 virus [U07.1, J12.82]  COVID-19 [U07.1]     Seen for follow on multiple problems as listed below. Subjective:  Sob has improved, denies CP ,abd pain. No fever or cough. D/w nursing. Uneventful night. ROS: denies fever, chills, cp,  n/v, HA unless stated above.      baricitinib  4 mg Oral Daily    sodium chloride flush  5-40 mL IntraVENous 2 times per day    sennosides-docusate sodium  1 tablet Oral BID    pantoprazole  40 mg Oral QAM AC    enoxaparin  1 mg/kg SubCUTAneous BID    dexamethasone  6 mg Oral Daily    Vitamin D  6,000 Units Oral Daily    Followed by   Derrick Webb ON 1/3/2022] Vitamin D  2,000 Units Oral Daily    ascorbic acid  500 mg Oral TID    amLODIPine  5 mg Oral Daily    aspirin  81 mg Oral Daily    atorvastatin  40 mg Oral Daily    docusate sodium  100 mg Oral BID    budesonide-formoterol  2 puff Inhalation BID    levothyroxine  200 mcg Oral Daily    metoprolol tartrate  25 mg Oral BID     sodium chloride flush, 5-40 mL, PRN  sodium chloride, 25 mL, PRN  ondansetron, 4 mg, Q8H PRN   Or  ondansetron, 4 mg, Q6H PRN  polyethylene glycol, 17 g, Daily PRN  guaiFENesin-dextromethorphan, 5 mL, Q4H PRN  albuterol, 2.5 mg, Q6H PRN  cyclobenzaprine, 10 mg, TID PRN  fluticasone, 1 spray, Daily PRN         Objective:    /74   Pulse 79   Temp 97.8 °F (36.6 °C) (Oral)   Resp 20   Ht 5' 5\" (1.651 m)   Wt (!) 368 lb (166.9 kg)   SpO2 95%   BMI 61.24 kg/m²   General Appearance: alert and oriented to person, place and time, in no overt  distress  Skin: warm and dry  Head: normocephalic and atraumatic  Eyes: extraocular eye movements intact, conjunctivae normal  Neck: supple and non-tender without mass  Pulmonary/Chest: clear to auscultation bilaterally, On O2 2 L   Cardiovascular: irreg , rate 79   Abdomen: soft, non-tender, non-distended, normal bowel sounds, no masses or organomegaly  Extremities: no cyanosis, clubbing   Neurologic: no cranial nerve deficit,  speech normal      Recent Labs     12/27/21  1350 12/28/21  0703    137   K 4.1 4.2    105   CO2 25 24   BUN 15 17   CREATININE 1.1* 0.9   GLUCOSE 96 140*   CALCIUM 9.4 9.1       Recent Labs     12/27/21  1350 12/28/21  0703   WBC 6.7 2.7*   RBC 5.23 4.82   HGB 15.8* 14.6   HCT 48.1* 44.3   MCV 92.0 91.9   MCH 30.2 30.3   MCHC 32.8 33.0   RDW 13.0 12.7    159   MPV 10.5 10.4       Labs and images reviewed     Radiology:   XR CHEST PORTABLE   Final Result   Developing pneumonia suggested in the left lower lung. Assessment:    Active Problems:    Obesity hypoventilation syndrome (HCC)    SHALA on CPAP    Morbid obesity (HCC)    COPD (chronic obstructive pulmonary disease) (HCC)    Hyperlipidemia    Acquired hypothyroidism    DM (diabetes mellitus), type 2 (HCC)    PAF (paroxysmal atrial fibrillation) ([facility]    COVID-19  Resolved Problems:    * No resolved hospital problems. *      Plan:  Acute hypoxic respiratory failure secondary to COVID-19 pneumonia-on O2 as needed, continue to titrate as possible. Continue steroids, Eliquis as per home[ok for dvt prophy], vitamins. Follow inflammatory markers. Pharmacy consulted for therapeutics. Follow inflammatory markers. Pro-Bj 0.14. We will continue to monitor pro-Bj and inflammatory markers. Noted on Baricitinib. A. fib by history-on Lopressor/Eliquis    COPD by history-on Flonase / Breo/as needed albuterol. History of TIA -on ASA and statin. Hypertension-on Norvasc, Lopressor    Hyperlipidemia-on statin    History of SHALA-on CPAP at home    Hypothyroidism-on Synthroid    On Eliquis okay for DVT prophylaxis  Full code.         Electronically signed by Woo Denney MD on 12/28/2021 at 3:09 PM

## 2021-12-28 NOTE — ACP (ADVANCE CARE PLANNING)
Advance Care Planning     Advance Care Planning Activator (Inpatient)  Conversation Note      Date of ACP Conversation: 12/28/2021     Conversation Conducted with: Patient with Decision Making Capacity    ACP Activator: Lori Vega RN    Health Care Decision Maker:     Current Designated Health Care Decision Maker:     Primary Decision Maker: Val[facility] - [phone]    Care Preferences    Ventilation: \"If you were in your present state of health and suddenly became very ill and were unable to breathe on your own, what would your preference be about the use of a ventilator (breathing machine) if it were available to you? \"      Would the patient desire the use of ventilator (breathing machine)?: yes    \"If your health worsens and it becomes clear that your chance of recovery is unlikely, what would your preference be about the use of a ventilator (breathing machine) if it were available to you? \"     Would the patient desire the use of ventilator (breathing machine)?: Yes      Resuscitation  \"CPR works best to restart the heart when there is a sudden event, like a heart attack, in someone who is otherwise healthy. Unfortunately, CPR does not typically restart the heart for people who have serious health conditions or who are very sick. \"    \"In the event your heart stopped as a result of an underlying serious health condition, would you want attempts to be made to restart your heart (answer \"yes\" for attempt to resuscitate) or would you prefer a natural death (answer \"no\" for do not attempt to resuscitate)? \" yes       [] Yes   [] No   Educated Patient / Jackie Alvarado regarding differences between Advance Directives and portable DNR orders.     Length of ACP Conversation in minutes:      Conversation Outcomes:  [x] ACP discussion completed  [] Existing advance directive reviewed with patient; no changes to patient's previously recorded wishes  [] New Advance Directive completed  [] Portable Do Not Rescitate prepared for Provider review and signature  [] POLST/POST/MOLST/MOST prepared for Provider review and signature      Follow-up plan:    [] Schedule follow-up conversation to continue planning  [x] Referred individual to Provider for additional questions/concerns   [] Advised patient/agent/surrogate to review completed ACP document and update if needed with changes in condition, patient preferences or care setting    [] This note routed to one or more involved healthcare providers

## 2021-12-29 LAB
ALBUMIN SERPL-MCNC: 3.2 G/DL (ref 3.5–5.2)
ALP BLD-CCNC: 74 U/L (ref 35–104)
ALT SERPL-CCNC: 10 U/L (ref 0–32)
ANION GAP SERPL CALCULATED.3IONS-SCNC: 12 MMOL/L (ref 7–16)
AST SERPL-CCNC: 17 U/L (ref 0–31)
BASOPHILS ABSOLUTE: 0 E9/L (ref 0–0.2)
BASOPHILS RELATIVE PERCENT: 0 % (ref 0–2)
BILIRUB SERPL-MCNC: 0.2 MG/DL (ref 0–1.2)
BUN BLDV-MCNC: 26 MG/DL (ref 6–23)
C-REACTIVE PROTEIN: 2.6 MG/DL (ref 0–0.4)
CALCIUM SERPL-MCNC: 9.3 MG/DL (ref 8.6–10.2)
CHLORIDE BLD-SCNC: 104 MMOL/L (ref 98–107)
CO2: 22 MMOL/L (ref 22–29)
CREAT SERPL-MCNC: 1 MG/DL (ref 0.5–1)
D DIMER: 216 NG/ML DDU
EOSINOPHILS ABSOLUTE: 0 E9/L (ref 0.05–0.5)
EOSINOPHILS RELATIVE PERCENT: 0 % (ref 0–6)
GFR AFRICAN AMERICAN: >60
GFR NON-AFRICAN AMERICAN: 55 ML/MIN/1.73
GLUCOSE BLD-MCNC: 139 MG/DL (ref 74–99)
HCT VFR BLD CALC: 44.5 % (ref 34–48)
HEMOGLOBIN: 14.3 G/DL (ref 11.5–15.5)
IMMATURE GRANULOCYTES #: 0.04 E9/L
IMMATURE GRANULOCYTES %: 0.7 % (ref 0–5)
LYMPHOCYTES ABSOLUTE: 1.17 E9/L (ref 1.5–4)
LYMPHOCYTES RELATIVE PERCENT: 20.1 % (ref 20–42)
MCH RBC QN AUTO: 29.3 PG (ref 26–35)
MCHC RBC AUTO-ENTMCNC: 32.1 % (ref 32–34.5)
MCV RBC AUTO: 91.2 FL (ref 80–99.9)
METER GLUCOSE: 129 MG/DL (ref 74–99)
METER GLUCOSE: 173 MG/DL (ref 74–99)
METER GLUCOSE: 214 MG/DL (ref 74–99)
MONOCYTES ABSOLUTE: 0.41 E9/L (ref 0.1–0.95)
MONOCYTES RELATIVE PERCENT: 7.1 % (ref 2–12)
NEUTROPHILS ABSOLUTE: 4.19 E9/L (ref 1.8–7.3)
NEUTROPHILS RELATIVE PERCENT: 72.1 % (ref 43–80)
PDW BLD-RTO: 12.5 FL (ref 11.5–15)
PLATELET # BLD: 200 E9/L (ref 130–450)
PMV BLD AUTO: 10.3 FL (ref 7–12)
POTASSIUM SERPL-SCNC: 4.3 MMOL/L (ref 3.5–5)
RBC # BLD: 4.88 E12/L (ref 3.5–5.5)
SODIUM BLD-SCNC: 138 MMOL/L (ref 132–146)
TOTAL PROTEIN: 6.8 G/DL (ref 6.4–8.3)
WBC # BLD: 5.8 E9/L (ref 4.5–11.5)

## 2021-12-29 PROCEDURE — 2700000000 HC OXYGEN THERAPY PER DAY

## 2021-12-29 PROCEDURE — 36415 COLL VENOUS BLD VENIPUNCTURE: CPT

## 2021-12-29 PROCEDURE — 82962 GLUCOSE BLOOD TEST: CPT

## 2021-12-29 PROCEDURE — 6370000000 HC RX 637 (ALT 250 FOR IP): Performed by: NURSE PRACTITIONER

## 2021-12-29 PROCEDURE — 1200000000 HC SEMI PRIVATE

## 2021-12-29 PROCEDURE — 2580000003 HC RX 258: Performed by: NURSE PRACTITIONER

## 2021-12-29 PROCEDURE — 6370000000 HC RX 637 (ALT 250 FOR IP): Performed by: INTERNAL MEDICINE

## 2021-12-29 PROCEDURE — 85378 FIBRIN DEGRADE SEMIQUANT: CPT

## 2021-12-29 PROCEDURE — 80053 COMPREHEN METABOLIC PANEL: CPT

## 2021-12-29 PROCEDURE — 97530 THERAPEUTIC ACTIVITIES: CPT

## 2021-12-29 PROCEDURE — 6360000002 HC RX W HCPCS: Performed by: NURSE PRACTITIONER

## 2021-12-29 PROCEDURE — 97161 PT EVAL LOW COMPLEX 20 MIN: CPT

## 2021-12-29 PROCEDURE — 99232 SBSQ HOSP IP/OBS MODERATE 35: CPT | Performed by: INTERNAL MEDICINE

## 2021-12-29 PROCEDURE — 85025 COMPLETE CBC W/AUTO DIFF WBC: CPT

## 2021-12-29 PROCEDURE — 94660 CPAP INITIATION&MGMT: CPT

## 2021-12-29 PROCEDURE — 86140 C-REACTIVE PROTEIN: CPT

## 2021-12-29 RX ADMIN — DOCUSATE SODIUM AND SENNOSIDES 1 TABLET: 8.6; 5 TABLET, FILM COATED ORAL at 20:11

## 2021-12-29 RX ADMIN — BUDESONIDE AND FORMOTEROL FUMARATE DIHYDRATE 2 PUFF: 80; 4.5 AEROSOL RESPIRATORY (INHALATION) at 09:40

## 2021-12-29 RX ADMIN — APIXABAN 5 MG: 5 TABLET, FILM COATED ORAL at 20:10

## 2021-12-29 RX ADMIN — LEVOTHYROXINE SODIUM 200 MCG: 0.1 TABLET ORAL at 06:22

## 2021-12-29 RX ADMIN — OXYCODONE HYDROCHLORIDE AND ACETAMINOPHEN 500 MG: 500 TABLET ORAL at 09:38

## 2021-12-29 RX ADMIN — Medication 6000 UNITS: at 09:39

## 2021-12-29 RX ADMIN — BARICITINIB 4 MG: 2 TABLET, FILM COATED ORAL at 09:39

## 2021-12-29 RX ADMIN — DEXAMETHASONE 6 MG: 4 TABLET ORAL at 09:39

## 2021-12-29 RX ADMIN — PANTOPRAZOLE SODIUM 40 MG: 40 TABLET, DELAYED RELEASE ORAL at 06:22

## 2021-12-29 RX ADMIN — BUDESONIDE AND FORMOTEROL FUMARATE DIHYDRATE 2 PUFF: 80; 4.5 AEROSOL RESPIRATORY (INHALATION) at 20:10

## 2021-12-29 RX ADMIN — METOPROLOL TARTRATE 25 MG: 25 TABLET, FILM COATED ORAL at 09:38

## 2021-12-29 RX ADMIN — DOCUSATE SODIUM 100 MG: 100 CAPSULE ORAL at 20:11

## 2021-12-29 RX ADMIN — ASPIRIN 81 MG CHEWABLE TABLET 81 MG: 81 TABLET CHEWABLE at 09:38

## 2021-12-29 RX ADMIN — Medication 10 ML: at 09:41

## 2021-12-29 RX ADMIN — ATORVASTATIN CALCIUM 40 MG: 40 TABLET, FILM COATED ORAL at 09:38

## 2021-12-29 RX ADMIN — APIXABAN 5 MG: 5 TABLET, FILM COATED ORAL at 09:38

## 2021-12-29 RX ADMIN — Medication 10 ML: at 20:12

## 2021-12-29 RX ADMIN — AMLODIPINE BESYLATE 5 MG: 5 TABLET ORAL at 09:39

## 2021-12-29 RX ADMIN — OXYCODONE HYDROCHLORIDE AND ACETAMINOPHEN 500 MG: 500 TABLET ORAL at 15:12

## 2021-12-29 RX ADMIN — OXYCODONE HYDROCHLORIDE AND ACETAMINOPHEN 500 MG: 500 TABLET ORAL at 20:11

## 2021-12-29 RX ADMIN — METOPROLOL TARTRATE 25 MG: 25 TABLET, FILM COATED ORAL at 20:11

## 2021-12-29 ASSESSMENT — PAIN DESCRIPTION - ORIENTATION: ORIENTATION: LOWER

## 2021-12-29 ASSESSMENT — PAIN SCALES - GENERAL
PAINLEVEL_OUTOF10: 0
PAINLEVEL_OUTOF10: 0

## 2021-12-29 ASSESSMENT — PAIN DESCRIPTION - PROGRESSION: CLINICAL_PROGRESSION: NOT CHANGED

## 2021-12-29 ASSESSMENT — PAIN DESCRIPTION - LOCATION: LOCATION: BACK

## 2021-12-29 ASSESSMENT — PAIN DESCRIPTION - PAIN TYPE: TYPE: CHRONIC PAIN

## 2021-12-29 NOTE — DISCHARGE INSTR - COC
Continuity of Care Form    Patient Name: Pepper Reed   :    MRN:  [id number]    Admit date:  2021  Discharge date:  ***    Code Status Order: Full Code   Advance Directives:      Admitting Physician:  Alejandra Hollis MD  PCP: Tayla Prado MD    Discharging Nurse: [facility] Unit/Room#: 0139/0442-W  Discharging Unit Phone Number: [phone]    Emergency Contact:   Extended Emergency Contact Information  Primary Emergency Contact: Lawrence Paerl  Address: /Mal Campos [facility], [address] Phone: [phone]  Work Phone: [phone]  Mobile Phone: [phone]  Relation: Child  Secondary Emergency Contact: Giorgio Shearer  Address: C/[place] Campos [facility], [address] Phone: [phone]  Mobile Phone: [phone]  Relation: Grandchild    Past Surgical History:  Past Surgical History:   Procedure Laterality Date    APPENDECTOMY      CARPAL TUNNEL RELEASE Bilateral     17 years ago    COLONOSCOPY      ENDOSCOPY, COLON, DIAGNOSTIC      FOOT SURGERY Right 2009    FOOT SURGERY  2014    reconstructive for hammer toes all four, not big toe left foot    HAMMER TOE SURGERY Left 2017    HYSTERECTOMY      JOINT REPLACEMENT  bilat knee    OTHER SURGICAL HISTORY Left 2014    hammer toe correction 2-4left foot and hardware;left calcaneous x2,flexor digital longis repair    TONSILLECTOMY         Immunization History: There is no immunization history on file for this patient.     Active Problems:  Patient Active Problem List   Diagnosis Code    SAHLA on CPAP G47.33, Z99.89    Obesity hypoventilation syndrome ([facility]) E66.2    Morbid obesity ([facility]) E66.01    COPD (chronic obstructive pulmonary disease) ([facility]) J44.9    Hyperlipidemia E78.5    Acquired hypothyroidism E03.9    DM (diabetes mellitus), type 2 ([facility]) E11.9    PAF (paroxysmal atrial fibrillation) ([facility]) I48.0    COVID-19 U07.1    Acute respiratory failure with hypoxia ([facility]) J96.01       Isolation/Infection:   Isolation            Droplet Plus          Patient Infection Status       Infection Onset Added Last Indicated Last Indicated By Review Planned Expiration Resolved Resolved By    COVID-19 12/27/21 12/27/21 12/27/21 COVID-19, Rapid 01/03/22 01/10/22      Resolved    COVID-19 (Rule Out) 12/27/21 12/27/21 12/27/21 COVID-19, Rapid (Ordered)   12/27/21 Rule-Out Test Resulted    C-diff Rule Out 09/19/20 09/19/20 09/19/20 Clostridium difficile EIA (Ordered)   09/20/20 Rule-Out Test Resulted    COVID-19 (Rule Out) 09/18/20 09/18/20 09/18/20 Covid-19 Ambulatory (Ordered)   09/20/20 Rule-Out Test Resulted    MRSA 08/12/19 08/17/19 08/12/19 Wound Culture   09/22/20 Lisette Inman RN            Nurse Assessment:  Last Vital Signs: /73   Pulse 74   Temp 97.4 °F (36.3 °C) (Oral)   Resp 20   Ht 5' 5\" (1.651 m)   Wt (!) 357 lb (161.9 kg)   SpO2 95%   BMI 59.41 kg/m²     Last documented pain score (0-10 scale): Pain Level: 0  Last Weight:   Wt Readings from Last 1 Encounters:   12/29/21 (!) 357 lb (161.9 kg)     Mental Status:  {IP PT MENTAL STATUS:18115}    IV Access:  - None    Nursing Mobility/ADLs:  Walking   Independent  Transfer  Independent  Bathing  Assisted  Dressing  Assisted  Toileting  Assisted  Feeding  Independent  Med Admin  Independent  Med Delivery   whole    Wound Care Documentation and Therapy:        Elimination:  Continence: Bowel: Yes  Bladder: Yes  Urinary Catheter: None   Colostomy/Ileostomy/Ileal Conduit: No       Date of Last BM:     No intake or output data in the 24 hours ending 12/29/21 1128  No intake/output data recorded. Safety Concerns:      At Risk for Falls    Impairments/Disabilities:      Amputation - Left toe    Nutrition Therapy:  Current Nutrition Therapy:   - Oral Diet:  General    Routes of Feeding: Oral  Liquids: No Restrictions  Daily Fluid Restriction: no  Last Modified Barium Swallow with Video (Video Swallowing Test): not done    Treatments at the Time of Hospital Discharge:   Respiratory Treatments:inhailers     Oxygen Therapy:    Ventilator:    - No ventilator support    Rehab Therapies: PT OT eval and treat  Weight Bearing Status/Restrictions: No weight bearing restirctions  Other Medical Equipment (for information only, NOT a DME order):  {EQUIPMENT:411272077}  Other Treatments: ***    Patient's personal belongings (please select all that are sent with patient):  {CHP DME Belongings:157708508}    RN SIGNATURE:  {Esignature:839462360}    CASE MANAGEMENT/SOCIAL WORK SECTION    Inpatient Status Date: ***    Readmission Risk Assessment Score:  Readmission Risk              Risk of Unplanned Readmission:  14           Discharging to Facility/ Agency   Name: [facility] [id number]  Phone: [phone]  Fax:[phone]    / signature: Electronically signed by NADIA Alcala on 12/29/2021 at 11:29 AM      PHYSICIAN SECTION    Prognosis: Fair    Condition at Discharge: [facility] Patient Condition:267564057}    Rehab Potential (if transferring to Rehab): {Prognosis:5370363595}    Recommended Labs or Other Treatments After Discharge: cmp, cbc with diff on 1/4 and notify physician    Physician Certification: I certify the above information and transfer of Jazmine Bustillos  is necessary for the continuing treatment of the diagnosis listed and that she requires skilled snf for less than 30 days.      Update Admission H&P: {[facility] DME Changes in BLECK:770877984}    PHYSICIAN SIGNATURE:  Electronically signed by Jennifer Shen DO on 12/30/21 at 6:58 PM EST

## 2021-12-29 NOTE — PROGRESS NOTES
Physical Therapy    Facility/Department: [address] MED SURG/TELE  Initial Assessment    NAME: Vlad Edwards  : 1950  MRN: [id number]    Date of Service: 2021      Attending Provider:  Theodora Yuan MD    Evaluating PT:  Tana Ayala. Roberto Pillai, P.T. Room #:  6595/8321-N  Diagnosis:  Acute respiratory failure with hypoxia (HCC) [J96.01]  Pneumonia due to COVID-19 virus [U07.1, J12.82]  COVID-19 [U07.1]  Pertinent PMHx/PSHx:  Recent L 2nd toe amp 3 weeks ago  Precautions:  Falls    SUBJECTIVE:    Pt lives alone in a 1 story home with a lift to enter. Pt ambulated with ww short distances, but primarily uses a motorized WC for mobility. She has home aides for 2 different companies that assist her at home. OBJECTIVE:   Initial Evaluation  Date: 21 Treatment Short Term/ Long Term   Goals   Was pt agreeable to Eval/treatment? yes     Does pt have pain? No c/o pain     Bed Mobility  Rolling: SBA  Supine to sit: SBA  Sit to supine: NA  Scooting: SBA  Independent    Transfers Sit to stand: MIN A  Stand to sit: MIN A  Stand pivot: MIN A with ww  Independent    Ambulation   35 feet with ww MIN A  50 feet with ww supervision   Stair negotiation: ascended and descended NA  NA   AM-PAC 6 Clicks 39/15       BLE ROM is WFL. BLE strength is grossly 4-/5 to 4/5. Sensation:  Pt denies numbness and tingling to extremities  Edema:  BLE  Balance: sitting is Independent and standing with ww is SBA/MIN A  Endurance: fair-    Vitals:  After amb pt had SOB and coughing while on 3L O2 her pulse ox was 89%. Patient education  Pt educated on hand placement during transfers.      Patient response to education:   Pt verbalized understanding Pt demonstrated skill Pt requires further education in this area   yes yes yes     ASSESSMENT:    Conditions Requiring Skilled Therapeutic Intervention:    [x]Decreased strength     []Decreased ROM  [x]Decreased functional mobility  [x]Decreased balance   [x]Decreased endurance   []Decreased posture  []Decreased sensation  []Decreased coordination   []Decreased vision  []Decreased safety awareness   []Increased pain   Comments:  Pt was in bed and agreeable to PT. After getting to EOB and after amb she had a coughing spell. Pt was able to walk with ww and slow gait speed and at times was a little unsteady and required MIN A for balance and safety. After amb she sat on EOB and wanted to stay sitting up. I offered her to sit in chair and she was agreeable. Chair was placed next to pt's bed and she stood with ww and walked 3 feet to chair. Treatment:  Patient practiced and was instructed in the following treatment:     Bed mobility, transfers, and gait with ww to improve functional strength, balance, respiratory function, and endurance. Pt was left sitting up in chair with call light left by patient. Pt's/ family goals   1. To go home. Patient and or family understand(s) diagnosis, prognosis, and plan of care.     PHYSICAL THERAPY PLAN OF CARE:    PT POC is established based on physician order and patient diagnosis     Referring provider/PT Order:  PT eval and treat  Diagnosis:  Acute respiratory failure with hypoxia ([facility] [J96.01]  Pneumonia due to COVID-19 virus [U07.1, J12.82]  COVID-19 [U07.1]  Specific instructions for next treatment:  Increase amb as able    Current Treatment Recommendations:     [x] Strengthening to improve independence with functional mobility   [] ROM to improve ROM and decrease spasm and pain which will help promote independence with functional mobility   [x] Balance Training to improve static/dynamic balance and to reduce fall risk  [x] Endurance Training to improve activity tolerance during functional mobility   [x] Transfer Training to improve safety and independence with all functional transfers   [x] Gait Training to improve gait mechanics, endurance and assess need for appropriate assistive device  [x] Stair Training in preparation for safe discharge home and/or into the community   [] Positioning to prevent skin breakdown and contractures  [] Safety and Education Training   [x] Patient/Caregiver Education   [] HEP  [] Other     PT long term treatment goals are located in above grid    Frequency of treatments: 2-5x/week x 1-2 weeks. Time in  12:30  Time out  12:55    Total Treatment Time 10 minutes     Evaluation Time includes thorough review of current medical information, gathering information on past medical history/social history and prior level of function, completion of standardized testing/informal observation of tasks, assessment of data and education on plan of care and goals. CPT codes:  [x] Low Complexity PT evaluation 49177  [] Moderate Complexity PT evaluation 94341  [] High Complexity PT evaluation 15589  [] PT Re-evaluation 22750  [] Gait training 52322 ** minutes  [] Manual therapy 85388 ** minutes  [x] Therapeutic activities 13260 10 minutes  [] Therapeutic exercises 03792 ** minutes  [] Neuromuscular reeducation 65843 ** minutes     Elbert Nichols., P.T.   License Number: [id number]

## 2021-12-29 NOTE — CARE COORDINATION
12/29/2021  Social Work Discharge Planning:Pt will go to [facility]. N-17 generated, hens completed and transport form is in chart.  Electronically signed by NADIA Galindo on 12/29/2021 at 11:28 AM

## 2021-12-29 NOTE — PROGRESS NOTES
[facility] Hospitalist   Progress Note    Admitting Date and Time: 12/27/2021  3:07 PM  Admit Dx: Acute respiratory failure with hypoxia ([facility] [J96.01]  Pneumonia due to COVID-19 virus [U07.1, J12.82]  COVID-19 [U07.1]     Seen for follow on multiple problems as listed below. Subjective:  Uneventful night , d/w nursing, denies CP or abd pain. SOB has significantly improved. On O2 3 l NC.      ROS: denies fever, chills, cp,  n/v, HA unless stated above.      baricitinib  4 mg Oral Daily    apixaban  5 mg Oral BID    sodium chloride flush  5-40 mL IntraVENous 2 times per day    sennosides-docusate sodium  1 tablet Oral BID    pantoprazole  40 mg Oral QAM AC    dexamethasone  6 mg Oral Daily    Vitamin D  6,000 Units Oral Daily    Followed by   Mary Block ON 1/3/2022] Vitamin D  2,000 Units Oral Daily    ascorbic acid  500 mg Oral TID    amLODIPine  5 mg Oral Daily    aspirin  81 mg Oral Daily    atorvastatin  40 mg Oral Daily    docusate sodium  100 mg Oral BID    budesonide-formoterol  2 puff Inhalation BID    levothyroxine  200 mcg Oral Daily    metoprolol tartrate  25 mg Oral BID     sodium chloride flush, 5-40 mL, PRN  sodium chloride, 25 mL, PRN  ondansetron, 4 mg, Q8H PRN   Or  ondansetron, 4 mg, Q6H PRN  polyethylene glycol, 17 g, Daily PRN  guaiFENesin-dextromethorphan, 5 mL, Q4H PRN  albuterol, 2.5 mg, Q6H PRN  cyclobenzaprine, 10 mg, TID PRN  fluticasone, 1 spray, Daily PRN         Objective:    /73   Pulse 74   Temp 97.4 °F (36.3 °C) (Oral)   Resp 20   Ht 5' 5\" (1.651 m)   Wt (!) 357 lb (161.9 kg)   SpO2 94%   BMI 59.41 kg/m²   General Appearance: alert and oriented to person, place and time, in no overt  distress  Skin: warm and dry  Head: normocephalic and atraumatic  Eyes: extraocular eye movements intact, conjunctivae normal  Neck: supple and non-tender without mass  Pulmonary/Chest: clear to auscultation bilaterally, On O2 2 L   Cardiovascular: irreg , rate 79   Abdomen: soft, non-tender, non-distended, normal bowel sounds, no masses or organomegaly  Extremities: no cyanosis, clubbing   Neurologic: no cranial nerve deficit,  speech normal      Recent Labs     12/27/21  1350 12/28/21  0703 12/29/21  0540    137 138   K 4.1 4.2 4.3    105 104   CO2 25 24 22   BUN 15 17 26*   CREATININE 1.1* 0.9 1.0   GLUCOSE 96 140* 139*   CALCIUM 9.4 9.1 9.3       Recent Labs     12/27/21  1350 12/28/21  0703 12/29/21  0540   WBC 6.7 2.7* 5.8   RBC 5.23 4.82 4.88   HGB 15.8* 14.6 14.3   HCT 48.1* 44.3 44.5   MCV 92.0 91.9 91.2   MCH 30.2 30.3 29.3   MCHC 32.8 33.0 32.1   RDW 13.0 12.7 12.5    159 200   MPV 10.5 10.4 10.3       Labs and images reviewed     Radiology:   XR CHEST PORTABLE   Final Result   Developing pneumonia suggested in the left lower lung. Assessment:    Active Problems:    Obesity hypoventilation syndrome (HCC)    SHALA on CPAP    Morbid obesity (HCC)    COPD (chronic obstructive pulmonary disease) (HCC)    Hyperlipidemia    Acquired hypothyroidism    DM (diabetes mellitus), type 2 (HCC)    PAF (paroxysmal atrial fibrillation) ([facility]    COVID-19  Resolved Problems:    * No resolved hospital problems. *      Plan:  Acute hypoxic respiratory failure secondary to COVID-19 pneumonia-on O2 as needed, continue to titrate as possible. Continue steroids, Eliquis as per home[ok for dvt prophy], vitamins. Follow inflammatory markers. Pharmacy consulted for therapeutics. Follow inflammatory markers. Pro-Bj 0.14. We will continue to monitor pro-Bj and inflammatory markers. Noted on Baricitinib. A. fib by history-on Lopressor/Eliquis    COPD by history-on Flonase / Breo/as needed albuterol. History of TIA -on ASA and statin. Hypertension-on Norvasc, Lopressor    Hyperlipidemia-on statin    History of SHALA-on CPAP at home    Hypothyroidism-on Synthroid    On Eliquis okay for DVT prophylaxis  Full code.         Electronically signed by Woo Denney MD on 12/29/2021 at 10:52 AM

## 2021-12-29 NOTE — ACP (ADVANCE CARE PLANNING)
Advance Care Planning     Advance Care Planning Activator (Inpatient)  Conversation Note      Date of ACP Conversation: 12/29/2021     Conversation Conducted with: patient    ACP Activator: Yomaira Chavez RN        Health Care Decision Maker:     Current Designated Health Care Decision Maker:     Primary Decision Maker: Suri Mendoza Child - [phone]      Care Preferences    Ventilation: \"If you were in your present state of health and suddenly became very ill and were unable to breathe on your own, what would your preference be about the use of a ventilator (breathing machine) if it were available to you? \"      Would the patient desire the use of ventilator (breathing machine)?: yes    \"If your health worsens and it becomes clear that your chance of recovery is unlikely, what would your preference be about the use of a ventilator (breathing machine) if it were available to you? \"     Would the patient desire the use of ventilator (breathing machine)?: Yes      Resuscitation  \"CPR works best to restart the heart when there is a sudden event, like a heart attack, in someone who is otherwise healthy. Unfortunately, CPR does not typically restart the heart for people who have serious health conditions or who are very sick. \"    \"In the event your heart stopped as a result of an underlying serious health condition, would you want attempts to be made to restart your heart (answer \"yes\" for attempt to resuscitate) or would you prefer a natural death (answer \"no\" for do not attempt to resuscitate)? \" yes       [] Yes   [] No   Educated Patient / Estefani Walsh regarding differences between Advance Directives and portable DNR orders.     Length of ACP Conversation in minutes:  10 mins    Conversation Outcomes:  [x] ACP discussion completed  [] Existing advance directive reviewed with patient; no changes to patient's previously recorded wishes  [] New Advance Directive completed  [] Portable Do Not Rescitate prepared for Provider review and signature  [] POLST/POST/MOLST/MOST prepared for Provider review and signature      Follow-up plan:    [] Schedule follow-up conversation to continue planning  [x] Referred individual to Provider for additional questions/concerns   [] Advised patient/agent/surrogate to review completed ACP document and update if needed with changes in condition, patient preferences or care setting    [] This note routed to one or more involved healthcare providers

## 2021-12-29 NOTE — CARE COORDINATION
+ covid no vax; ( didn't want it). Lives alone ; has motorized W/C; cane/ ww.receives personal care services through [facility] and [facility]. Spoke to [address] at [facility]; states personal care help will not enter a covid + home  X 2weeks. Left VM message at [facility] re; same. Spoke with pt by phone; explained to her that personal care aides will not service a covid + home. She has concerns re; her help at home. PT/OT ordered; await evals. states she would be agreeable to rehab; has been to [address] [place] [address] in past; would like referral there. Referral made to [facility] [address]. she will accept. Currently on 5lnc( none at home). No DME preference will have [employer] follow. Also states has CPAP at night through [facility], but was recalled and hasn't had it since. currently on Brian. Will follow. Transport forms on chart. pt aware and agreeable. Bertha Rodriguez.

## 2021-12-29 NOTE — PLAN OF CARE
Problem: Falls - Risk of:  Goal: Will remain free from falls  Description: Will remain free from falls  Outcome: Met This Shift     Problem: Falls - Risk of:  Goal: Will remain free from falls  Description: Will remain free from falls  Outcome: Met This Shift  Goal: Absence of physical injury  Description: Absence of physical injury  Outcome: Met This Shift

## 2021-12-30 LAB
ALBUMIN SERPL-MCNC: 3.7 G/DL (ref 3.5–5.2)
ALP BLD-CCNC: 78 U/L (ref 35–104)
ALT SERPL-CCNC: 10 U/L (ref 0–32)
ANION GAP SERPL CALCULATED.3IONS-SCNC: 15 MMOL/L (ref 7–16)
AST SERPL-CCNC: 12 U/L (ref 0–31)
BASOPHILS ABSOLUTE: 0.01 E9/L (ref 0–0.2)
BASOPHILS RELATIVE PERCENT: 0.1 % (ref 0–2)
BILIRUB SERPL-MCNC: 0.3 MG/DL (ref 0–1.2)
BUN BLDV-MCNC: 36 MG/DL (ref 6–23)
CALCIUM SERPL-MCNC: 9.3 MG/DL (ref 8.6–10.2)
CHLORIDE BLD-SCNC: 103 MMOL/L (ref 98–107)
CO2: 20 MMOL/L (ref 22–29)
CREAT SERPL-MCNC: 1.1 MG/DL (ref 0.5–1)
D DIMER: <200 NG/ML DDU
EKG ATRIAL RATE: 144 BPM
EKG Q-T INTERVAL: 302 MS
EKG QRS DURATION: 72 MS
EKG QTC CALCULATION (BAZETT): 449 MS
EKG R AXIS: 7 DEGREES
EKG T AXIS: 119 DEGREES
EKG VENTRICULAR RATE: 133 BPM
EOSINOPHILS ABSOLUTE: 0 E9/L (ref 0.05–0.5)
EOSINOPHILS RELATIVE PERCENT: 0 % (ref 0–6)
GFR AFRICAN AMERICAN: 59
GFR NON-AFRICAN AMERICAN: 49 ML/MIN/1.73
GLUCOSE BLD-MCNC: 112 MG/DL (ref 74–99)
HCT VFR BLD CALC: 45.4 % (ref 34–48)
HEMOGLOBIN: 15 G/DL (ref 11.5–15.5)
IMMATURE GRANULOCYTES #: 0.06 E9/L
IMMATURE GRANULOCYTES %: 0.8 % (ref 0–5)
LYMPHOCYTES ABSOLUTE: 1.2 E9/L (ref 1.5–4)
LYMPHOCYTES RELATIVE PERCENT: 15.1 % (ref 20–42)
MCH RBC QN AUTO: 29.6 PG (ref 26–35)
MCHC RBC AUTO-ENTMCNC: 33 % (ref 32–34.5)
MCV RBC AUTO: 89.7 FL (ref 80–99.9)
METER GLUCOSE: 129 MG/DL (ref 74–99)
METER GLUCOSE: 137 MG/DL (ref 74–99)
METER GLUCOSE: 158 MG/DL (ref 74–99)
METER GLUCOSE: 171 MG/DL (ref 74–99)
MONOCYTES ABSOLUTE: 0.6 E9/L (ref 0.1–0.95)
MONOCYTES RELATIVE PERCENT: 7.6 % (ref 2–12)
NEUTROPHILS ABSOLUTE: 6.06 E9/L (ref 1.8–7.3)
NEUTROPHILS RELATIVE PERCENT: 76.4 % (ref 43–80)
PDW BLD-RTO: 12.3 FL (ref 11.5–15)
PLATELET # BLD: 253 E9/L (ref 130–450)
PMV BLD AUTO: 10.3 FL (ref 7–12)
POTASSIUM SERPL-SCNC: 4.5 MMOL/L (ref 3.5–5)
PROCALCITONIN: 0.06 NG/ML (ref 0–0.08)
RBC # BLD: 5.06 E12/L (ref 3.5–5.5)
SODIUM BLD-SCNC: 138 MMOL/L (ref 132–146)
TOTAL PROTEIN: 6.6 G/DL (ref 6.4–8.3)
WBC # BLD: 7.9 E9/L (ref 4.5–11.5)

## 2021-12-30 PROCEDURE — 6360000002 HC RX W HCPCS: Performed by: NURSE PRACTITIONER

## 2021-12-30 PROCEDURE — 36415 COLL VENOUS BLD VENIPUNCTURE: CPT

## 2021-12-30 PROCEDURE — 6370000000 HC RX 637 (ALT 250 FOR IP): Performed by: INTERNAL MEDICINE

## 2021-12-30 PROCEDURE — 80053 COMPREHEN METABOLIC PANEL: CPT

## 2021-12-30 PROCEDURE — 1200000000 HC SEMI PRIVATE

## 2021-12-30 PROCEDURE — 2700000000 HC OXYGEN THERAPY PER DAY

## 2021-12-30 PROCEDURE — 2580000003 HC RX 258: Performed by: NURSE PRACTITIONER

## 2021-12-30 PROCEDURE — 99232 SBSQ HOSP IP/OBS MODERATE 35: CPT | Performed by: INTERNAL MEDICINE

## 2021-12-30 PROCEDURE — 6370000000 HC RX 637 (ALT 250 FOR IP): Performed by: NURSE PRACTITIONER

## 2021-12-30 PROCEDURE — 94660 CPAP INITIATION&MGMT: CPT

## 2021-12-30 PROCEDURE — 97165 OT EVAL LOW COMPLEX 30 MIN: CPT

## 2021-12-30 PROCEDURE — 85025 COMPLETE CBC W/AUTO DIFF WBC: CPT

## 2021-12-30 PROCEDURE — 82962 GLUCOSE BLOOD TEST: CPT

## 2021-12-30 PROCEDURE — 93010 ELECTROCARDIOGRAM REPORT: CPT | Performed by: INTERNAL MEDICINE

## 2021-12-30 PROCEDURE — 84145 PROCALCITONIN (PCT): CPT

## 2021-12-30 PROCEDURE — 85378 FIBRIN DEGRADE SEMIQUANT: CPT

## 2021-12-30 RX ORDER — DEXAMETHASONE 6 MG/1
6 TABLET ORAL DAILY
Qty: 6 TABLET | Refills: 0 | DISCHARGE
Start: 2021-12-31 | End: 2022-01-06

## 2021-12-30 RX ADMIN — PANTOPRAZOLE SODIUM 40 MG: 40 TABLET, DELAYED RELEASE ORAL at 06:08

## 2021-12-30 RX ADMIN — ATORVASTATIN CALCIUM 40 MG: 40 TABLET, FILM COATED ORAL at 09:36

## 2021-12-30 RX ADMIN — APIXABAN 5 MG: 5 TABLET, FILM COATED ORAL at 09:38

## 2021-12-30 RX ADMIN — APIXABAN 5 MG: 5 TABLET, FILM COATED ORAL at 22:19

## 2021-12-30 RX ADMIN — OXYCODONE HYDROCHLORIDE AND ACETAMINOPHEN 500 MG: 500 TABLET ORAL at 22:18

## 2021-12-30 RX ADMIN — ASPIRIN 81 MG CHEWABLE TABLET 81 MG: 81 TABLET CHEWABLE at 09:39

## 2021-12-30 RX ADMIN — Medication 10 ML: at 09:41

## 2021-12-30 RX ADMIN — Medication 10 ML: at 22:24

## 2021-12-30 RX ADMIN — Medication 6000 UNITS: at 09:38

## 2021-12-30 RX ADMIN — DEXAMETHASONE 6 MG: 4 TABLET ORAL at 09:37

## 2021-12-30 RX ADMIN — OXYCODONE HYDROCHLORIDE AND ACETAMINOPHEN 500 MG: 500 TABLET ORAL at 14:04

## 2021-12-30 RX ADMIN — OXYCODONE HYDROCHLORIDE AND ACETAMINOPHEN 500 MG: 500 TABLET ORAL at 09:38

## 2021-12-30 RX ADMIN — METOPROLOL TARTRATE 25 MG: 25 TABLET, FILM COATED ORAL at 09:38

## 2021-12-30 RX ADMIN — AMLODIPINE BESYLATE 5 MG: 5 TABLET ORAL at 09:38

## 2021-12-30 RX ADMIN — DOCUSATE SODIUM AND SENNOSIDES 1 TABLET: 8.6; 5 TABLET, FILM COATED ORAL at 22:24

## 2021-12-30 RX ADMIN — LEVOTHYROXINE SODIUM 200 MCG: 0.1 TABLET ORAL at 06:08

## 2021-12-30 RX ADMIN — METOPROLOL TARTRATE 25 MG: 25 TABLET, FILM COATED ORAL at 22:15

## 2021-12-30 RX ADMIN — BARICITINIB 4 MG: 2 TABLET, FILM COATED ORAL at 09:36

## 2021-12-30 RX ADMIN — BUDESONIDE AND FORMOTEROL FUMARATE DIHYDRATE 2 PUFF: 80; 4.5 AEROSOL RESPIRATORY (INHALATION) at 22:14

## 2021-12-30 RX ADMIN — BUDESONIDE AND FORMOTEROL FUMARATE DIHYDRATE 2 PUFF: 80; 4.5 AEROSOL RESPIRATORY (INHALATION) at 09:36

## 2021-12-30 ASSESSMENT — PAIN SCALES - GENERAL
PAINLEVEL_OUTOF10: 0
PAINLEVEL_OUTOF10: 0

## 2021-12-30 NOTE — PROGRESS NOTES
Occupational Therapy  OCCUPATIONAL THERAPY INITIAL EVALUATION      Date:2021  Patient Name: Nirali Williamson  MRN: [id number]  : 1950  Room: [address]         SPQQ:10/18/9248                                                  Patient Name: Nirali Williamson    MRN: [id number]    : 1950    Room: [address] [place], [place] [address]      Evaluating OT: Clay Conn OTR/L   [id number]      Referring Provider:BRENDA Hooper CNP    Specific Provider Orders/Date:OT eval and treat 2021      Diagnosis:  Acute respiratory failure with hypoxia ([facility] [J96.01]  Pneumonia due to COVID-19 virus [U07.1, J12.82]  COVID-19 [U07.1]     Pertinent Medical History: COPD,     Precautions:  Fall Risk, DROPLET PLUS, O2     Assessment of current deficits    [x] Functional mobility  [x]ADLs  [x] Strength               []Cognition    [x] Functional transfers   [x] IADLs         [x] Safety Awareness   [x]Endurance    [] Fine Coordination              [x] Balance      [] Vision/perception   []Sensation     []Gross Motor Coordination  [] ROM  [] Delirium                   [] Motor Control     OT PLAN OF CARE   OT POC based on physician orders, patient diagnosis and results of clinical assessment    Frequency/Duration  2-4days/wk for 2 weeks PRN   Specific OT Treatment Interventions to include:   ADL retraining/adapted techniques and AE recommendations to increase functional independence within precautions                    Energy conservation techniques to improve tolerance for selfcare routine   Functional transfer/mobility training/DME recommendations for increased independence, safety and fall prevention         Patient/family education to increase safety and functional independence             Environmental modifications for safe mobility and completion of ADLs Therapeutic activity to improve functional performance during ADLs. Therapeutic exercise to improve tolerance and functional strength for ADLs    Balance retraining/tolerance tasks for facilitation of postural control with dynamic challenges during ADLs .       Positioning to improve functional independence  []    Recommended Adaptive Equipment: TBD     Home Living: Pt lives alone,  1 story with lift to enter  Bathroom setup: walk in shower - aides assist with showering    Equipment owned: reacher (broken), toileting aides, cane, w/walker,motorized w/c     Prior Level of Function: assist  with ADLs , assist with IADLs; ambulated primarily with motorized w/c, walker for short distance       Pain Level: butt - patient reports she has wounds ; waffle cushion provided , and nurse informed   Cognition: A&O: 4/4;    Memory:  good   Sequencing:  Good    Problem solving:  Good    Judgement/safety:  Good      Functional Assessment:  AM-PAC Daily Activity Raw Score: 15/24   Initial Eval Status  Date: 12/30/21 Treatment Status  Date: STGs = LTGs  Time frame: 10-14 days   Feeding Independent      Grooming Set-up   Independent    UB Dressing SBA/set-up   Independent    LB Dressing Mod A    has AE at home and aides that assist   Min A   Bathing Mod A   Min A    Toileting Uses toileting aide at home   Mod I    Bed Mobility  SBA  Supine to sit   Mod I    Functional Transfers CGA  Sit-stand from bed   Mod I    Functional Mobility SBA, w/walker,O2   Steps next to bed   No SOB   O2 sats 90s on 3 L   Mod I  with good tolerance    Balance Sitting:     Static:  Independent     Dynamic:Min A  Standing: CGA   Mod I    Activity Tolerance Fair with light activity   Good  with ADL activity    Visual/  Perceptual Glasses: yes                 Hand Dominance right    AROM (PROM) Strength Additional Info:    RUE  WFL WFL good  and wfl FMC/dexterity noted during ADL tasks       [facility] WFl good  and wfl FMC/dexterity noted during ADL tasks       Hearing: [facility]   Sensation:  No c/o numbness or tingling   Tone: WFL   Edema: none observed     Comments: Upon arrival patient lying in bed . At end of session, patient sitting in chair  with call light and phone within reach, all lines and tubes intact. Overall patient demonstrated  decreased independence and safety during completion of ADL/functional transfer/mobility tasks. Pt would benefit from continued skilled OT to increase safety and independence with completion of ADL/IADL tasks for functional independence and quality of life. Rehab Potential: good for established goals     Patient / Family Goal: none stated       Patient and/or family were instructed on functional diagnosis, prognosis/goals and OT plan of care. Demonstrated good  understanding. Eval Complexity: Low    Time In: 2830  Time Out: 0811      Min Units   OT Eval Low 97165 x  1   OT Eval Medium 69605      OT Eval High 15392      OT Re-Eval [id number]       Therapeutic Ex [id number]       Therapeutic Activities 75117       ADL/Self Care 32217       Orthotic Management 10954       Manual [id number]     Neuro Re-Ed 02242       Non-Billable Time          Evaluation Time additionally includes thorough review of current medical information, gathering information on past medical history/social history and prior level of function, interpretation of standardized testing/informal observation of tasks, assessment of data and development of plan of care and goals.             Serge Max  OTR/L  OT [id number]

## 2021-12-30 NOTE — PROGRESS NOTES
Date: 12/29/2021    Time: 11:40 PM    Patient Placed On BIPAP/CPAP/ Non-Invasive Ventilation? Yes    If no must comment. Facial area red/color change? No           If YES are Blister/Lesion present? No   If yes must notify nursing staff  BIPAP/CPAP skin barrier?   Yes    Skin barrier type:mepilexlite       Comments:        Usha Cheng RCP

## 2021-12-30 NOTE — CARE COORDINATION
+ covid ; no vax currently on 3lnc bipap at night. [facility] 16/24; plan at discharge is [place]; accepted. precert initiated but do not need to wait for auth. Transport forms/ [facility] on chart. Thom Reddy.

## 2021-12-30 NOTE — PROGRESS NOTES
Date: 12/30/2021    Time: 3:30 AM    Patient Placed On BIPAP/CPAP/ Non-Invasive Ventilation? No    If no must comment. Facial area red/color change? No           If YES are Blister/Lesion present? No   If yes must notify nursing staff  BIPAP/CPAP skin barrier? Yes    Skin barrier type:mepilexlite       Comments: Pt remains on CPAP at this time.         Leah Mares RCP      12/30/21 0329   NIV Type   Mode CPAP   Mask Type Full face mask   Mask Size Medium   Settings/Measurements   CPAP/EPAP 12 cmH2O   Resp 16   FiO2  30 %   Vt Exhaled 382 mL   Minute Volume 6.8 Liters   Mask Leak (lpm) 41 lpm   Comfort Level Good   Using Accessory Muscles No

## 2021-12-31 VITALS
HEIGHT: 65 IN | DIASTOLIC BLOOD PRESSURE: 73 MMHG | HEART RATE: 64 BPM | RESPIRATION RATE: 20 BRPM | WEIGHT: 293 LBS | SYSTOLIC BLOOD PRESSURE: 124 MMHG | TEMPERATURE: 97.5 F | OXYGEN SATURATION: 96 % | BODY MASS INDEX: 48.82 KG/M2

## 2021-12-31 LAB — METER GLUCOSE: 102 MG/DL (ref 74–99)

## 2021-12-31 PROCEDURE — 6370000000 HC RX 637 (ALT 250 FOR IP): Performed by: NURSE PRACTITIONER

## 2021-12-31 PROCEDURE — 6360000002 HC RX W HCPCS: Performed by: NURSE PRACTITIONER

## 2021-12-31 PROCEDURE — 82962 GLUCOSE BLOOD TEST: CPT

## 2021-12-31 PROCEDURE — 99239 HOSP IP/OBS DSCHRG MGMT >30: CPT | Performed by: INTERNAL MEDICINE

## 2021-12-31 PROCEDURE — 6370000000 HC RX 637 (ALT 250 FOR IP): Performed by: INTERNAL MEDICINE

## 2021-12-31 PROCEDURE — 94660 CPAP INITIATION&MGMT: CPT

## 2021-12-31 PROCEDURE — 2700000000 HC OXYGEN THERAPY PER DAY

## 2021-12-31 RX ADMIN — ASPIRIN 81 MG CHEWABLE TABLET 81 MG: 81 TABLET CHEWABLE at 07:57

## 2021-12-31 RX ADMIN — DOCUSATE SODIUM 100 MG: 100 CAPSULE ORAL at 07:57

## 2021-12-31 RX ADMIN — BUDESONIDE AND FORMOTEROL FUMARATE DIHYDRATE 2 PUFF: 80; 4.5 AEROSOL RESPIRATORY (INHALATION) at 07:58

## 2021-12-31 RX ADMIN — OXYCODONE HYDROCHLORIDE AND ACETAMINOPHEN 500 MG: 500 TABLET ORAL at 07:57

## 2021-12-31 RX ADMIN — APIXABAN 5 MG: 5 TABLET, FILM COATED ORAL at 07:58

## 2021-12-31 RX ADMIN — DEXAMETHASONE 6 MG: 4 TABLET ORAL at 07:59

## 2021-12-31 RX ADMIN — BARICITINIB 4 MG: 2 TABLET, FILM COATED ORAL at 07:57

## 2021-12-31 RX ADMIN — DOCUSATE SODIUM AND SENNOSIDES 1 TABLET: 8.6; 5 TABLET, FILM COATED ORAL at 07:57

## 2021-12-31 RX ADMIN — ATORVASTATIN CALCIUM 40 MG: 40 TABLET, FILM COATED ORAL at 07:57

## 2021-12-31 RX ADMIN — AMLODIPINE BESYLATE 5 MG: 5 TABLET ORAL at 07:58

## 2021-12-31 RX ADMIN — METOPROLOL TARTRATE 25 MG: 25 TABLET, FILM COATED ORAL at 07:57

## 2021-12-31 RX ADMIN — LEVOTHYROXINE SODIUM 200 MCG: 0.1 TABLET ORAL at 06:41

## 2021-12-31 RX ADMIN — Medication 6000 UNITS: at 07:58

## 2021-12-31 RX ADMIN — PANTOPRAZOLE SODIUM 40 MG: 40 TABLET, DELAYED RELEASE ORAL at 06:41

## 2021-12-31 ASSESSMENT — PAIN DESCRIPTION - LOCATION: LOCATION: BACK

## 2021-12-31 ASSESSMENT — PAIN SCALES - GENERAL: PAINLEVEL_OUTOF10: 0

## 2021-12-31 ASSESSMENT — PAIN DESCRIPTION - PROGRESSION: CLINICAL_PROGRESSION: NOT CHANGED

## 2021-12-31 ASSESSMENT — PAIN DESCRIPTION - PAIN TYPE: TYPE: CHRONIC PAIN

## 2021-12-31 NOTE — DISCHARGE SUMMARY
[facility] Physician Discharge Summary       [facility]  [address]   [phone]          Activity level: as yaima     Diet: ADULT DIET; Regular; 3 carb choices (45 gm/meal); Low Fat/Low Chol/High Fiber/GERI     Dispo:snf     Condition at discharge: fair     Patient ID:  Luigi Amos  [id number]  17 y.o.  1950    Admit date: 12/27/2021    Discharge date and time:  12/31/2021  8:17 AM    Admission Diagnoses: Active Problems:    Obesity hypoventilation syndrome (HCC)    SHALA on CPAP    Morbid obesity (HCC)    COPD (chronic obstructive pulmonary disease) (HCC)    Hyperlipidemia    Acquired hypothyroidism    DM (diabetes mellitus), type 2 (HCC)    PAF (paroxysmal atrial fibrillation) ([facility]    COVID-19  Resolved Problems:    * No resolved hospital problems. *      Discharge Diagnoses: Active Problems:    Obesity hypoventilation syndrome (HCC)    SHALA on CPAP    Morbid obesity (HCC)    COPD (chronic obstructive pulmonary disease) (HCC)    Hyperlipidemia    Acquired hypothyroidism    DM (diabetes mellitus), type 2 (HCC)    PAF (paroxysmal atrial fibrillation) ([facility] [address]    COVID-19  Resolved Problems:    * No resolved hospital problems. *    Acute respiratory failure with hypoxia  Pneumonia due to covid 19  Obesity(bmi59.41)  Acidosis  htn  Hypothyroidism  hyperlipidemia      Consults:  IP CONSULT TO PHARMACY    Procedures: none    Hospital Course: Patient was admitted with Acute respiratory failure with hypoxia ([facility] [address] [J96.01]  Pneumonia due to COVID-19 virus [U07.1, J12.82]  COVID-19 [U07.1]. Patient presented to the ED with dyspnea and productive cough that has been ongoing for the past three weeks. She recently discharged from [facility] in [address] [place] [address] three weeks ago for amputation of a hammer toe from her left foot.   After discharge she found out her daughter, who drove her to and from the hospital, tested positive for COVID. Her symptoms of cough and dyspnea started about three weeks ago, and one week ago lost her appetite and has not eaten since. Around that time she started with cold sweats as well. Today she was driven to the hospital by her grandson when her dyspnea was unbearable and tested positive in the ED. She notified her grandson of positive test.  Her SPO2 in the ED was 88% and she was placed on 2L. She will be admitted for further management of COVID respiratory failure. Pt seen and examined. Pt improved on baricitinib and steroids. Pt seen on day of discharge and pt denied fevers, chills,n/v. Discharge planning d/w pt. Discharge planning d/w pt. Time given for questions and all questions answered. Discharge Exam:  Vitals:    12/30/21 2023 12/30/21 2200 12/31/21 0128 12/31/21 0745   BP:  123/74  124/73   Pulse:  79  64   Resp: 23 20 22 20   Temp:  97.5 °F (36.4 °C)     TempSrc:  Oral     SpO2: 93% 94% 96% 96%   Weight:       Height:           Skin: warm and dry, no rash or erythema  Pulmonary/Chest: clear to auscultation bilaterally- no wheezes, rales or rhonchi, normal air movement, no respiratory distress  Cardiovascular: rhythm reg at rate of 80  Abdomen: soft, non-tender, non-distended, normal bowel sounds, no masses or organomegaly  Extremities: no cyanosis, no clubbing and no edema  No intake/output data recorded. No intake/output data recorded. LABS:  Recent Labs     12/29/21  0540 12/30/21  1128    138   K 4.3 4.5    103   CO2 22 20*   BUN 26* 36*   CREATININE 1.0 1.1*   GLUCOSE 139* 112*   CALCIUM 9.3 9.3       Recent Labs     12/29/21  0540 12/30/21  1128   WBC 5.8 7.9   RBC 4.88 5.06   HGB 14.3 15.0   HCT 44.5 45.4   MCV 91.2 89.7   MCH 29.3 29.6   MCHC 32.1 33.0   RDW 12.5 12.3    253   MPV 10.3 10.3       No results for input(s): POCGLU in the last 72 hours.     CBC with Differential:    Lab Results   Component Value Date    WBC 7.9 12/30/2021    RBC 5.06 12/30/2021 HGB 15.0 12/30/2021    HCT 45.4 12/30/2021     12/30/2021    MCV 89.7 12/30/2021    MCH 29.6 12/30/2021    MCHC 33.0 12/30/2021    RDW 12.3 12/30/2021    LYMPHOPCT 15.1 12/30/2021    MONOPCT 7.6 12/30/2021    BASOPCT 0.1 12/30/2021    MONOSABS 0.60 12/30/2021    LYMPHSABS 1.20 12/30/2021    EOSABS 0.00 12/30/2021    BASOSABS 0.01 12/30/2021     CMP:    Lab Results   Component Value Date     12/30/2021    K 4.5 12/30/2021    K 4.2 12/28/2021     12/30/2021    CO2 20 12/30/2021    BUN 36 12/30/2021    CREATININE 1.1 12/30/2021    GFRAA 59 12/30/2021    LABGLOM 49 12/30/2021    GLUCOSE 112 12/30/2021    PROT 6.6 12/30/2021    LABALBU 3.7 12/30/2021    CALCIUM 9.3 12/30/2021    BILITOT 0.3 12/30/2021    ALKPHOS 78 12/30/2021    AST 12 12/30/2021    ALT 10 12/30/2021       Imaging:   XR CHEST PORTABLE   Final Result   Developing pneumonia suggested in the left lower lung.              Patient Instructions:      Medication List      START taking these medications    dexamethasone 6 MG tablet  Commonly known as: DECADRON  Take 1 tablet by mouth daily for 6 doses        CHANGE how you take these medications    metoprolol tartrate 25 MG tablet  Commonly known as: LOPRESSOR  Take 1 tablet by mouth 2 times daily Take morning of surgery with a sip of water  What changed: additional instructions        CONTINUE taking these medications    acetaminophen 500 MG tablet  Commonly known as: TYLENOL     albuterol (2.5 MG/3ML) 0.083% nebulizer solution  Commonly known as: PROVENTIL     amLODIPine 5 MG tablet  Commonly known as: NORVASC     aspirin 81 MG tablet     atorvastatin 40 MG tablet  Commonly known as: LIPITOR     Breo Ellipta 100-25 MCG/INH Aepb inhaler  Generic drug: fluticasone-vilanterol     cetirizine 5 MG tablet  Commonly known as: ZYRTEC     cyclobenzaprine 10 MG tablet  Commonly known as: FLEXERIL     docusate sodium 100 MG capsule  Commonly known as: COLACE     Eliquis 5 MG Tabs tablet  Generic drug: apixaban  Take 1 tablet by mouth 2 times daily     famotidine 20 MG tablet  Commonly known as: PEPCID     ferrous sulfate 325 (65 Fe) MG tablet  Commonly known as: IRON 325     fluticasone 50 MCG/ACT nasal spray  Commonly known as: FLONASE     levothyroxine 200 MCG tablet  Commonly known as: SYNTHROID  Take 1 tablet by mouth daily     vitamin D 1000 UNIT Tabs tablet  Commonly known as: CHOLECALCIFEROL           Where to Get Your Medications      Information about where to get these medications is not yet available    Ask your nurse or doctor about these medications  · dexamethasone 6 MG tablet           Total time for discharge is 37 min    Signed:  Electronically signed by Adrien Jose DO on 12/31/2021 at 8:17 AM

## 2021-12-31 NOTE — PROGRESS NOTES
Called and [facility]      Also called and gave nurse to nurse report to [facility]

## 2021-12-31 NOTE — PROGRESS NOTES
[facility] Hospitalist   Progress Note    Admitting Date and Time: 12/27/2021  3:07 PM  Admit Dx: Acute respiratory failure with hypoxia ([facility] [J96.01]  Pneumonia due to COVID-19 virus [U07.1, J12.82]  COVID-19 [U07.1]    Subjective:    Patient was admitted with Acute respiratory failure with hypoxia ([facility] [address] [J96.01]  Pneumonia due to COVID-19 virus [U07.1, J12.82]  COVID-19 [U07.1].  Patient denies fever, chills, cp, sob, n/v.     baricitinib  4 mg Oral Daily    apixaban  5 mg Oral BID    sodium chloride flush  5-40 mL IntraVENous 2 times per day    sennosides-docusate sodium  1 tablet Oral BID    pantoprazole  40 mg Oral QAM AC    dexamethasone  6 mg Oral Daily    Vitamin D  6,000 Units Oral Daily    Followed by   Johnna Benavides ON 1/3/2022] Vitamin D  2,000 Units Oral Daily    ascorbic acid  500 mg Oral TID    amLODIPine  5 mg Oral Daily    aspirin  81 mg Oral Daily    atorvastatin  40 mg Oral Daily    docusate sodium  100 mg Oral BID    budesonide-formoterol  2 puff Inhalation BID    levothyroxine  200 mcg Oral Daily    metoprolol tartrate  25 mg Oral BID     sodium chloride flush, 5-40 mL, PRN  sodium chloride, 25 mL, PRN  ondansetron, 4 mg, Q8H PRN   Or  ondansetron, 4 mg, Q6H PRN  polyethylene glycol, 17 g, Daily PRN  guaiFENesin-dextromethorphan, 5 mL, Q4H PRN  albuterol, 2.5 mg, Q6H PRN  cyclobenzaprine, 10 mg, TID PRN  fluticasone, 1 spray, Daily PRN         Objective:    Vitals:    12/30/21 0329 12/30/21 0730 12/30/21 2023 12/30/21 2200   BP:  119/61  123/74   Pulse:  78  79   Resp: 16 18 23 20   Temp:  97.7 °F (36.5 °C)  97.5 °F (36.4 °C)   TempSrc:  Oral  Oral   SpO2:  96% 93% 94%   Weight:       Height:           Skin: warm and dry, no rash or erythema  Pulmonary/Chest: clear to auscultation bilaterally- no wheezes, rales or rhonchi, normal air movement, no respiratory distress  Cardiovascular: rhythm reg at rate of 80  Abdomen: soft, non-tender, non-distended, normal bowel sounds, no masses or organomegaly  Extremities: no cyanosis, no clubbing and no edema    Recent Labs     12/29/21  0540 12/30/21  1128    138   K 4.3 4.5    103   CO2 22 20*   BUN 26* 36*   CREATININE 1.0 1.1*   GLUCOSE 139* 112*   CALCIUM 9.3 9.3       Recent Labs     12/29/21  0540 12/30/21  1128   WBC 5.8 7.9   RBC 4.88 5.06   HGB 14.3 15.0   HCT 44.5 45.4   MCV 91.2 89.7   MCH 29.3 29.6   MCHC 32.1 33.0   RDW 12.5 12.3    253   MPV 10.3 10.3       CBC with Differential:    Lab Results   Component Value Date    WBC 7.9 12/30/2021    RBC 5.06 12/30/2021    HGB 15.0 12/30/2021    HCT 45.4 12/30/2021     12/30/2021    MCV 89.7 12/30/2021    MCH 29.6 12/30/2021    MCHC 33.0 12/30/2021    RDW 12.3 12/30/2021    LYMPHOPCT 15.1 12/30/2021    MONOPCT 7.6 12/30/2021    BASOPCT 0.1 12/30/2021    MONOSABS 0.60 12/30/2021    LYMPHSABS 1.20 12/30/2021    EOSABS 0.00 12/30/2021    BASOSABS 0.01 12/30/2021     CMP:    Lab Results   Component Value Date     12/30/2021    K 4.5 12/30/2021    K 4.2 12/28/2021     12/30/2021    CO2 20 12/30/2021    BUN 36 12/30/2021    CREATININE 1.1 12/30/2021    GFRAA 59 12/30/2021    LABGLOM 49 12/30/2021    GLUCOSE 112 12/30/2021    PROT 6.6 12/30/2021    LABALBU 3.7 12/30/2021    CALCIUM 9.3 12/30/2021    BILITOT 0.3 12/30/2021    ALKPHOS 78 12/30/2021    AST 12 12/30/2021    ALT 10 12/30/2021        Radiology:   XR CHEST PORTABLE   Final Result   Developing pneumonia suggested in the left lower lung. Assessment:    Active Problems:    Obesity hypoventilation syndrome (HCC)    SHALA on CPAP    Morbid obesity (HCC)    COPD (chronic obstructive pulmonary disease) (HCC)    Hyperlipidemia    Acquired hypothyroidism    DM (diabetes mellitus), type 2 (HCC)    PAF (paroxysmal atrial fibrillation) ([facility]    COVID-19  Resolved Problems:    * No resolved hospital problems. *      Plan:  1. Acute respiratory failure with hypoxia(88%o2sat)POA wean o2 as able.

## 2021-12-31 NOTE — PROGRESS NOTES
[facility] called and rescheduled  for 11 am 12/31. Dr. Amalia Colorado notified.      1130 Updated time of  is 1230 by physician ambulates

## 2021-12-31 NOTE — PLAN OF CARE
Problem: Airway Clearance - Ineffective  Goal: Achieve or maintain patent airway  Outcome: Completed     Problem: Gas Exchange - Impaired  Goal: Absence of hypoxia  Outcome: Completed  Goal: Promote optimal lung function  Outcome: Completed     Problem: Gas Exchange - Impaired  Goal: Promote optimal lung function  Outcome: Completed

## 2022-03-18 ENCOUNTER — HOSPITAL ENCOUNTER (OUTPATIENT)
Dept: GENERAL RADIOLOGY | Age: 72
Discharge: HOME | End: 2022-03-20
Payer: COMMERCIAL

## 2022-03-18 DIAGNOSIS — Z13.820 SCREENING FOR OSTEOPOROSIS: ICD-10-CM

## 2022-03-18 DIAGNOSIS — Z12.31 VISIT FOR SCREENING MAMMOGRAM: ICD-10-CM

## 2022-03-18 PROCEDURE — 77080 DXA BONE DENSITY AXIAL: CPT

## 2022-03-18 PROCEDURE — 77063 BREAST TOMOSYNTHESIS BI: CPT

## 2022-08-31 ENCOUNTER — HOSPITAL ENCOUNTER (OUTPATIENT)
Dept: SLEEP CENTER | Age: 72
Discharge: HOME | End: 2022-08-31

## 2022-08-31 DIAGNOSIS — G47.33 OSA ON CPAP: Primary | Chronic | ICD-10-CM

## 2022-08-31 DIAGNOSIS — Z99.89 OSA ON CPAP: Primary | Chronic | ICD-10-CM

## 2022-08-31 DIAGNOSIS — E66.01 CLASS 3 SEVERE OBESITY DUE TO EXCESS CALORIES WITH BODY MASS INDEX (BMI) OF 50.0 TO 59.9 IN ADULT, UNSPECIFIED WHETHER SERIOUS COMORBIDITY PRESENT (HCC): ICD-10-CM

## 2022-08-31 DIAGNOSIS — J96.01 ACUTE RESPIRATORY FAILURE WITH HYPOXIA (HCC): ICD-10-CM

## 2022-08-31 DIAGNOSIS — J44.9 CHRONIC OBSTRUCTIVE PULMONARY DISEASE, UNSPECIFIED COPD TYPE (HCC): Chronic | ICD-10-CM

## 2022-08-31 DIAGNOSIS — I48.0 PAF (PAROXYSMAL ATRIAL FIBRILLATION) (HCC): ICD-10-CM

## 2022-08-31 DIAGNOSIS — E66.01 MORBID OBESITY (HCC): Chronic | ICD-10-CM

## 2022-11-06 ENCOUNTER — HOSPITAL ENCOUNTER (EMERGENCY)
Age: 72
Discharge: HOME | End: 2022-11-06
Attending: EMERGENCY MEDICINE
Payer: COMMERCIAL

## 2022-11-06 ENCOUNTER — APPOINTMENT (OUTPATIENT)
Dept: CT IMAGING | Age: 72
End: 2022-11-06
Payer: COMMERCIAL

## 2022-11-06 VITALS
SYSTOLIC BLOOD PRESSURE: 125 MMHG | BODY MASS INDEX: 56.08 KG/M2 | WEIGHT: 293 LBS | HEART RATE: 90 BPM | RESPIRATION RATE: 17 BRPM | DIASTOLIC BLOOD PRESSURE: 68 MMHG | OXYGEN SATURATION: 97 % | TEMPERATURE: 97.4 F

## 2022-11-06 DIAGNOSIS — R11.2 NAUSEA AND VOMITING, UNSPECIFIED VOMITING TYPE: ICD-10-CM

## 2022-11-06 DIAGNOSIS — S09.90XA INJURY OF HEAD, INITIAL ENCOUNTER: ICD-10-CM
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
## 2022-11-06 LAB
ALBUMIN SERPL-MCNC: 3.9 G/DL (ref 3.5–5.2)
ALP BLD-CCNC: 101 U/L (ref 35–104)
ALT SERPL-CCNC: 6 U/L (ref 0–32)
ANION GAP SERPL CALCULATED.3IONS-SCNC: 9 MMOL/L (ref 7–16)
AST SERPL-CCNC: 10 U/L (ref 0–31)
BASOPHILS ABSOLUTE: 0.04 E9/L (ref 0–0.2)
BASOPHILS RELATIVE PERCENT: 0.3 % (ref 0–2)
BILIRUB SERPL-MCNC: 0.3 MG/DL (ref 0–1.2)
BUN BLDV-MCNC: 18 MG/DL (ref 6–23)
CALCIUM SERPL-MCNC: 10.4 MG/DL (ref 8.6–10.2)
CHLORIDE BLD-SCNC: 104 MMOL/L (ref 98–107)
CO2: 30 MMOL/L (ref 22–29)
CREAT SERPL-MCNC: 1.2 MG/DL (ref 0.5–1)
EOSINOPHILS ABSOLUTE: 0.11 E9/L (ref 0.05–0.5)
EOSINOPHILS RELATIVE PERCENT: 0.9 % (ref 0–6)
GFR SERPL CREATININE-BSD FRML MDRD: 48 ML/MIN/1.73
GLUCOSE BLD-MCNC: 91 MG/DL (ref 74–99)
HCT VFR BLD CALC: 47.9 % (ref 34–48)
HEMOGLOBIN: 15.8 G/DL (ref 11.5–15.5)
IMMATURE GRANULOCYTES #: 0.06 E9/L
IMMATURE GRANULOCYTES %: 0.5 % (ref 0–5)
LYMPHOCYTES ABSOLUTE: 1.31 E9/L (ref 1.5–4)
LYMPHOCYTES RELATIVE PERCENT: 11.1 % (ref 20–42)
MCH RBC QN AUTO: 32.8 PG (ref 26–35)
MCHC RBC AUTO-ENTMCNC: 33 % (ref 32–34.5)
MCV RBC AUTO: 99.6 FL (ref 80–99.9)
MONOCYTES ABSOLUTE: 0.74 E9/L (ref 0.1–0.95)
MONOCYTES RELATIVE PERCENT: 6.3 % (ref 2–12)
NEUTROPHILS ABSOLUTE: 9.54 E9/L (ref 1.8–7.3)
NEUTROPHILS RELATIVE PERCENT: 80.9 % (ref 43–80)
PDW BLD-RTO: 13.3 FL (ref 11.5–15)
PLATELET # BLD: 216 E9/L (ref 130–450)
PMV BLD AUTO: 11.1 FL (ref 7–12)
POTASSIUM SERPL-SCNC: 4 MMOL/L (ref 3.5–5)
RBC # BLD: 4.81 E12/L (ref 3.5–5.5)
SODIUM BLD-SCNC: 143 MMOL/L (ref 132–146)
TOTAL PROTEIN: 6.8 G/DL (ref 6.4–8.3)
TROPONIN, HIGH SENSITIVITY: 11 NG/L (ref 0–9)
TROPONIN, HIGH SENSITIVITY: 12 NG/L (ref 0–9)
WBC # BLD: 11.8 E9/L (ref 4.5–11.5)

## 2022-11-06 PROCEDURE — 84484 ASSAY OF TROPONIN QUANT: CPT

## 2022-11-06 PROCEDURE — 72125 CT NECK SPINE W/O DYE: CPT

## 2022-11-06 PROCEDURE — 80053 COMPREHEN METABOLIC PANEL: CPT

## 2022-11-06 PROCEDURE — 93005 ELECTROCARDIOGRAM TRACING: CPT | Performed by: EMERGENCY MEDICINE

## 2022-11-06 PROCEDURE — 85025 COMPLETE CBC W/AUTO DIFF WBC: CPT

## 2022-11-06 PROCEDURE — 70450 CT HEAD/BRAIN W/O DYE: CPT

## 2022-11-06 PROCEDURE — 99284 EMERGENCY DEPT VISIT MOD MDM: CPT

## 2022-11-06 ASSESSMENT — ENCOUNTER SYMPTOMS
SHORTNESS OF BREATH: 0
ABDOMINAL PAIN: 0
NAUSEA: 1
EYE REDNESS: 0
VOMITING: 1

## 2022-11-06 ASSESSMENT — PAIN - FUNCTIONAL ASSESSMENT
PAIN_FUNCTIONAL_ASSESSMENT: NONE - DENIES PAIN
PAIN_FUNCTIONAL_ASSESSMENT: NONE - DENIES PAIN

## 2022-11-06 NOTE — ED NOTES
Pt had mecahnical fall while in shower pta, pt unsure of LOC or if she hit head, abrasion noted right arm, pt is taking maurizio Yeh, RN  11/06/22 5443

## 2022-11-07 LAB
EKG ATRIAL RATE: 394 BPM
EKG Q-T INTERVAL: 354 MS
EKG QRS DURATION: 82 MS
EKG QTC CALCULATION (BAZETT): 425 MS
EKG R AXIS: 163 DEGREES
EKG T AXIS: 142 DEGREES
EKG VENTRICULAR RATE: 87 BPM

## 2022-11-07 PROCEDURE — 93010 ELECTROCARDIOGRAM REPORT: CPT | Performed by: INTERNAL MEDICINE

## 2022-11-07 NOTE — ED PROVIDER NOTES
Chief complaint: Fall and vomiting      HPI:  11/6/22, Time: 7:19 PM EST    HPI             Ardyce Bamberger is a 67 y.o. female presenting to the ED for fall and vomiting. The history is obtained from the patient as well as the patient's medical record. Patient's present emergency department the chief complaint of fall and vomiting. Patient reports that prior to arrival she was attempting to in the shower hang up her life alert button. She then fell. She did not have any injuries in the fall. She states that after she fell she had 2 episodes of emesis which prompted her visit to the emergency department. There is no hematemesis coffee-ground emesis. This mild in severity. It makes better. Nothing is worse. No treatment prior to arrival.    ROS:   Review of Systems   Constitutional:  Negative for chills and fatigue. HENT:  Negative for congestion. Eyes:  Negative for redness. Respiratory:  Negative for shortness of breath. Cardiovascular:  Negative for chest pain. Gastrointestinal:  Positive for nausea and vomiting. Negative for abdominal pain. Genitourinary:  Negative for dysuria. Musculoskeletal:  Negative for arthralgias. Skin:  Negative for rash. Neurological:  Negative for light-headedness. Psychiatric/Behavioral:  Negative for confusion. All other systems reviewed and are negative.    --------------------------------------------- PAST HISTORY ---------------------------------------------  Past Medical History:  has a past medical history of Arthritis, Cerebral artery occlusion with cerebral infarction ([facility] [address] Cigarette nicotine dependence without complication, COPD (chronic obstructive pulmonary disease) ([facility] [address] Depression, Diabetes mellitus ([facility] [address] GERD (gastroesophageal reflux disease), Hyperlipidemia, Hypertension, Hypothyroidism, Obese, SHALA (obstructive sleep apnea), and Palate mass. Past Surgical History:  has a past surgical history that includes Hysterectomy;  Foot surgery (Right, 2009); Appendectomy; Tonsillectomy; Colonoscopy; Endoscopy, colon, diagnostic; Carpal tunnel release (Bilateral); other surgical history (Left, 11/5/2014); Foot surgery (2014); Hammer toe surgery (Left, 03/06/2017); and joint replacement (bilat knee). Social History:  reports that she has quit smoking. Her smoking use included cigarettes and cigars. She has never used smokeless tobacco. She reports that she does not drink alcohol and does not use drugs. Family History: family history includes Breast Cancer in her sister; Cancer in her father; Cancer (age of onset: 10) in her son; Heart Disease in her mother; Cleotis Och in her brother, brother, and brother. The patients home medications have been reviewed. Allergies: Food, Iodine, Darvocet [propoxyphene n-acetaminophen], Influenza vaccines, Lamictal [lamotrigine], Other, Pentazocine lactate, Pneumococcal vaccines, Propoxyphene, Cephalexin, and Talwin [pentazocine]    ---------------------------------------------------PHYSICAL EXAM--------------------------------------  Constitutional/General: Alert and oriented x3, well appearing, non toxic in NAD  Head: Normocephalic and atraumatic  Mouth: Oropharynx clear, handling secretions, no trismus  Neck: Supple, full ROM,  Pulmonary: Lungs clear to auscultation bilaterally, no wheezes, rales, or rhonchi. Not in respiratory distress  Cardiovascular:  Regular rate. Regular rhythm. No murmurs  Chest: no chest wall tenderness  Abdomen: Soft. Non tender. Non distended. No rebound, guarding, or rigidity. No pulsatile masses appreciated. Musculoskeletal: Moves all extremities x 4. Warm and well perfused, no clubbing, cyanosis, or edema. Capillary refill <3 seconds  Skin: warm and dry. No rashes.    Neurologic: GCS 15, no gross focal neurologic deficits  Psych: Normal Affect    -------------------------------------------------- RESULTS -------------------------------------------------  I have personally reviewed all laboratory and imaging results for this patient. Results are listed below. LABS:  Results for orders placed or performed during the hospital encounter of 11/06/22   CBC with Auto Differential   Result Value Ref Range    WBC 11.8 (H) 4.5 - 11.5 E9/L    RBC 4.81 3.50 - 5.50 E12/L    Hemoglobin 15.8 (H) 11.5 - 15.5 g/dL    Hematocrit 47.9 34.0 - 48.0 %    MCV 99.6 80.0 - 99.9 fL    MCH 32.8 26.0 - 35.0 pg    MCHC 33.0 32.0 - 34.5 %    RDW 13.3 11.5 - 15.0 fL    Platelets 077 782 - 526 E9/L    MPV 11.1 7.0 - 12.0 fL    Neutrophils % 80.9 (H) 43.0 - 80.0 %    Immature Granulocytes % 0.5 0.0 - 5.0 %    Lymphocytes % 11.1 (L) 20.0 - 42.0 %    Monocytes % 6.3 2.0 - 12.0 %    Eosinophils % 0.9 0.0 - 6.0 %    Basophils % 0.3 0.0 - 2.0 %    Neutrophils Absolute 9.54 (H) 1.80 - 7.30 E9/L    Immature Granulocytes # 0.06 E9/L    Lymphocytes Absolute 1.31 (L) 1.50 - 4.00 E9/L    Monocytes Absolute 0.74 0.10 - 0.95 E9/L    Eosinophils Absolute 0.11 0.05 - 0.50 E9/L    Basophils Absolute 0.04 0.00 - 0.20 E9/L   CMP   Result Value Ref Range    Sodium 143 132 - 146 mmol/L    Potassium 4.0 3.5 - 5.0 mmol/L    Chloride 104 98 - 107 mmol/L    CO2 30 (H) 22 - 29 mmol/L    Anion Gap 9 7 - 16 mmol/L    Glucose 91 74 - 99 mg/dL    BUN 18 6 - 23 mg/dL    Creatinine 1.2 (H) 0.5 - 1.0 mg/dL    Est, Glom Filt Rate 48 >=60 mL/min/1.73    Calcium 10.4 (H) 8.6 - 10.2 mg/dL    Total Protein 6.8 6.4 - 8.3 g/dL    Albumin 3.9 3.5 - 5.2 g/dL    Total Bilirubin 0.3 0.0 - 1.2 mg/dL    Alkaline Phosphatase 101 35 - 104 U/L    ALT 6 0 - 32 U/L    AST 10 0 - 31 U/L   Troponin   Result Value Ref Range    Troponin, High Sensitivity 11 (H) 0 - 9 ng/L   Troponin   Result Value Ref Range    Troponin, High Sensitivity 12 (H) 0 - 9 ng/L       RADIOLOGY:  Interpreted by Radiologist.  CT CERVICAL SPINE WO CONTRAST   Final Result   No acute abnormality of the cervical spine.          CT HEAD WO CONTRAST   Final Result   No acute intracranial abnormality. EKG:  This EKG is signed and interpreted by me. Atrial fibrillation rate 87, no ST segment elevation, QRS duration 82 MS, QTc 425 MS  Interpreted by me      ------------------------- NURSING NOTES AND VITALS REVIEWED ---------------------------   The nursing notes within the ED encounter and vital signs as below have been reviewed by myself. /68   Pulse 90   Temp 97.4 °F (36.3 °C) (Oral)   Resp 17   Wt (!) 337 lb (152.9 kg)   SpO2 97%   BMI 56.08 kg/m²   Oxygen Saturation Interpretation: Normal    The patients available past medical records and past encounters were reviewed. ------------------------------ ED COURSE/MEDICAL DECISION MAKING----------------------  Medications - No data to display          Medical Decision Making:   I, Dr. Urbano Bishop am the primary physician of record. Parth Carmona is a 67 y.o. female who presents to the ED for fall head injury nausea and vomiting. differential diagnosis includes but is not limited to intracranial hemorrhage, electrolyte derangement, dehydration the patient does have a past medical history of   has a past medical history of Arthritis, Cerebral artery occlusion with cerebral infarction ([address] Cigarette nicotine dependence without complication, COPD (chronic obstructive pulmonary disease) ([address] Depression, Diabetes mellitus ([address] GERD (gastroesophageal reflux disease), Hyperlipidemia, Hypertension, Hypothyroidism, Obese, SHALA (obstructive sleep apnea), and Palate mass. . Labs and imaging obtained, reviewed. Patient treated symptomatically. Results discussed with patient. Patient did a CBC fairly unremarkable, CMP unremarkable, high-sensitivity troponin unremarkable delta, EKG with no ischemic changes. CT head and C-spine unremarkable. Patient is at her baseline. She is able to tolerate p.o. challenge. Patient be discharged follow-up outpatient.         Re-Evaluations/Consultations:          Patient is in the bed no acute distress. Results cussed. Patient will be discharged               This patient's ED course included: History, physical examination, reevaluation prior to disposition, labs, imaging, telemetry monitoring, EKG      This patient has remained hemodynamically stable during their ED course. Counseling: The emergency provider has spoken with the patient and discussed todays results, in addition to providing specific details for the plan of care and counseling regarding the diagnosis and prognosis. Questions are answered at this time and they are agreeable with the plan.       --------------------------------- IMPRESSION AND DISPOSITION ---------------------------------    IMPRESSION  1. Fall, initial encounter    2. Injury of head, initial encounter    3. Nausea and vomiting, unspecified vomiting type        DISPOSITION  Disposition: Discharge to home  Patient condition is stable        NOTE: This report was transcribed using voice recognition software.  Every effort was made to ensure accuracy; however, inadvertent computerized transcription errors may be present         Donna Kelley DO  11/06/22 [id number]

## 2022-11-07 NOTE — ED NOTES
Wound care to skin tear on left forearm. Orthostatic vitals signs taken and documented.        Sadaf Sylvester RN  11/06/22 [id number]

## 2023-01-12 ENCOUNTER — OFFICE VISIT (OUTPATIENT)
Dept: SURGERY | Age: 73
End: 2023-01-12
Payer: COMMERCIAL

## 2023-01-12 VITALS
OXYGEN SATURATION: 96 % | BODY MASS INDEX: 56.08 KG/M2 | DIASTOLIC BLOOD PRESSURE: 64 MMHG | SYSTOLIC BLOOD PRESSURE: 114 MMHG | HEIGHT: 65 IN | HEART RATE: 74 BPM | RESPIRATION RATE: 20 BRPM | TEMPERATURE: 96.6 F

## 2023-01-12 DIAGNOSIS — F17.200 SMOKING ADDICTION: Primary | ICD-10-CM

## 2023-01-12 DIAGNOSIS — L98.9 BENIGN SKIN LESION: ICD-10-CM

## 2023-01-12 PROCEDURE — 1090F PRES/ABSN URINE INCON ASSESS: CPT | Performed by: PHYSICIAN ASSISTANT

## 2023-01-12 PROCEDURE — G8419 CALC BMI OUT NRM PARAM NOF/U: HCPCS | Performed by: PHYSICIAN ASSISTANT

## 2023-01-12 PROCEDURE — 3017F COLORECTAL CA SCREEN DOC REV: CPT | Performed by: PHYSICIAN ASSISTANT

## 2023-01-12 PROCEDURE — 4004F PT TOBACCO SCREEN RCVD TLK: CPT | Performed by: PHYSICIAN ASSISTANT

## 2023-01-12 PROCEDURE — G8399 PT W/DXA RESULTS DOCUMENT: HCPCS | Performed by: PHYSICIAN ASSISTANT

## 2023-01-12 PROCEDURE — G8484 FLU IMMUNIZE NO ADMIN: HCPCS | Performed by: PHYSICIAN ASSISTANT

## 2023-01-12 PROCEDURE — 1123F ACP DISCUSS/DSCN MKR DOCD: CPT | Performed by: PHYSICIAN ASSISTANT

## 2023-01-12 PROCEDURE — 99202 OFFICE O/P NEW SF 15 MIN: CPT | Performed by: PHYSICIAN ASSISTANT

## 2023-01-12 PROCEDURE — G8427 DOCREV CUR MEDS BY ELIG CLIN: HCPCS | Performed by: PHYSICIAN ASSISTANT

## 2023-01-12 NOTE — PROGRESS NOTES
Department of Plastic Surgery - Adult  Attending Consult Note      CHIEF COMPLAINT:  Lesion of right leg     History Obtained From:  patient    HISTORY OF PRESENT ILLNESS:                The patient is a 67 y.o. female who presents with concerns of a right posterior leg lesion. She states that she cannot see the lesion but was told it is there by her daughter. She states that her daughter has informed her that the lesion has become larger and pigmented. She has never had the area biopsied in the past she denies any discharge or drainage from the area or getting the lesion caught her clothing. She is a diabetic and also an every day smoker. Past Medical History:    Past Medical History:   Diagnosis Date    Arthritis     Cerebral artery occlusion with cerebral infarction ([facility] [address] 1998?    tia     Cigarette nicotine dependence without complication 8/92/2215    COPD (chronic obstructive pulmonary disease) ([facility] [address]     Depression 10/2014    \"doing ok\". anxiety    Diabetes mellitus ([facility] [address]     prediabetic    GERD (gastroesophageal reflux disease)     Hyperlipidemia     Hypertension     Hypothyroidism     Obese 10/2014    states weight aprox. 400 lbs.     SHALA (obstructive sleep apnea)     no treatment    Palate mass      Past Surgical History:    Past Surgical History:   Procedure Laterality Date    APPENDECTOMY      CARPAL TUNNEL RELEASE Bilateral     17 years ago    COLONOSCOPY      ENDOSCOPY, COLON, DIAGNOSTIC      FOOT SURGERY Right 2009    FOOT SURGERY  2014    reconstructive for hammer toes all four, not big toe left foot    HAMMER TOE SURGERY Left 03/06/2017    HYSTERECTOMY (CERVIX STATUS UNKNOWN)      JOINT REPLACEMENT  bilat knee    OTHER SURGICAL HISTORY Left 11/5/2014    hammer toe correction 2-4left foot and hardware;left calcaneous x2,flexor digital longis repair    TONSILLECTOMY       Current Medications:      Current Outpatient Medications   Medication Sig Dispense Refill    amLODIPine (NORVASC) 5 MG tablet Take 5 mg by mouth daily       cyclobenzaprine (FLEXERIL) 10 MG tablet Take 10 mg by mouth 3 times daily as needed      acetaminophen (TYLENOL) 500 MG tablet Take 500 mg by mouth every 6 hours as needed for Pain      ELIQUIS 5 MG TABS tablet Take 1 tablet by mouth 2 times daily 180 tablet 3    famotidine (PEPCID) 20 MG tablet Take 20 mg by mouth 2 times daily      ferrous sulfate (IRON 325) 325 (65 Fe) MG tablet Take 325 mg by mouth daily (with breakfast)      levothyroxine (SYNTHROID) 200 MCG tablet Take 1 tablet by mouth daily 30 tablet 0    albuterol (PROVENTIL) (2.5 MG/3ML) 0.083% nebulizer solution Take 2.5 mg by nebulization daily as needed for Wheezing      fluticasone-vilanterol (BREO ELLIPTA) 100-25 MCG/INH AEPB inhaler Inhale 1 puff into the lungs daily      fluticasone (FLONASE) 50 MCG/ACT nasal spray 1 spray by Each Nostril route daily as needed for Rhinitis      vitamin D (CHOLECALCIFEROL) 1000 UNIT TABS tablet Take 1,000 Units by mouth daily      metoprolol tartrate (LOPRESSOR) 25 MG tablet Take 1 tablet by mouth 2 times daily Take morning of surgery with a sip of water (Patient taking differently: Take 25 mg by mouth 2 times daily) 180 tablet 3    atorvastatin (LIPITOR) 40 MG tablet Take 40 mg by mouth daily      cetirizine (ZYRTEC) 5 MG tablet Take 10 mg by mouth daily       docusate sodium (COLACE) 100 MG capsule Take 100 mg by mouth 2 times daily      aspirin 81 MG tablet Take 81 mg by mouth daily LD 3/7/18 per surgeon       No current facility-administered medications for this visit. Allergies:  Food, Iodine, Darvocet [propoxyphene n-acetaminophen], Influenza vaccines, Lamictal [lamotrigine], Other, Pentazocine lactate, Pneumococcal vaccines, Propoxyphene, Cephalexin, and Talwin [pentazocine]    Social History:   Social History     Socioeconomic History    Marital status:       Spouse name: Not on file    Number of children: Not on file    Years of education: Not on file    Highest education level: Not on file   Occupational History    Not on file   Tobacco Use    Smoking status: Every Day     Types: Cigarettes, Cigars    Smokeless tobacco: Never    Tobacco comments:     quit 2018   Vaping Use    Vaping Use: Never used   Substance and Sexual Activity    Alcohol use: No    Drug use: No    Sexual activity: Not on file   Other Topics Concern    Not on file   Social History Narrative    Not on file     Social Determinants of Health     Financial Resource Strain: Not on file   Food Insecurity: Not on file   Transportation Needs: Not on file   Physical Activity: Not on file   Stress: Not on file   Social Connections: Not on file   Intimate Partner Violence: Not on file   Housing Stability: Not on file     Family History:   Family History   Problem Relation Age of Onset    Heart Disease Mother     Cancer Father     Breast Cancer Sister     Lung Cancer Brother     Lung Cancer Brother     Cancer Son 6        Non Hodgkins Lymphoma    Lung Cancer Brother        REVIEW OF SYSTEMS:    CONSTITUTIONAL:  negative for  fevers, chills, sweats and fatigue  EYES: negative for dipolpia or acute vision loss. RESPIRATORY:  negative for  dry cough, cough with sputum, dyspnea, wheezing and chest pain  HENT:negative for pain, headache, difficulty swallowing or nose bleeds. CARDIOVASCULAR:  negative for  chest pain, dyspnea, palpitations, syncope  GASTROINTESTINAL:  negative for nausea, vomiting, change in bowel habits, diarrhea, constipation and abdominal pain  EXTREMITIES: negative for edema  MUSCULOSKELETAL: negative for muscle weakness  SKIN: positive for lesion,negative for itching or rashes.   HEME: negative for easy brusing or bleeding  BEHAVIOR/PSYCH:  negative for poor appetite, increased appetite, decreased sleep and poor concentration    PHYSICAL EXAM:        VITALS:  /64 (Site: Left Lower Arm, Position: Sitting, Cuff Size: Large Adult)   Pulse 74   Temp (!) 96.6 °F (35.9 °C) (Infrared)   Resp 20 Ht 5' 5\" (1.651 m)   SpO2 96%   BMI 56.08 kg/m²   CONSTITUTIONAL:  awake, alert, cooperative, no apparent distress, and appears stated age  EYES: PERRLA, EOMI, no signs of occular infection  LUNGS:  No increased work of breathing, good air exchange  CARDIOVASCULAR:  regular rate and rhythm   EXTREMITIES: no signs of clubbing or cyanosis. MUSCULOSKELETAL: negative for flaccid muscle tone or spastic movements. NEURO: Cranial nerves II-XII grossly intact. No signs of agitated mood. SKIN: Right popliteal fossa elevated pigmented lesion-  24mm x 5mm,  brown in color, irregular border, raised, no signs of bleeding,drainage or infection. Non tender to palpation.   Seborrheic keratosis        DATA:    Labs: CBC:   Lab Results   Component Value Date/Time    WBC 11.8 11/06/2022 06:30 PM    RBC 4.81 11/06/2022 06:30 PM    HGB 15.8 11/06/2022 06:30 PM    HCT 47.9 11/06/2022 06:30 PM    MCV 99.6 11/06/2022 06:30 PM    MCH 32.8 11/06/2022 06:30 PM    MCHC 33.0 11/06/2022 06:30 PM    RDW 13.3 11/06/2022 06:30 PM     11/06/2022 06:30 PM    MPV 11.1 11/06/2022 06:30 PM     BMP:    Lab Results   Component Value Date/Time     11/06/2022 06:30 PM    K 4.0 11/06/2022 06:30 PM    K 4.2 12/28/2021 07:03 AM     11/06/2022 06:30 PM    CO2 30 11/06/2022 06:30 PM    BUN 18 11/06/2022 06:30 PM    LABALBU 3.9 11/06/2022 06:30 PM    CREATININE 1.2 11/06/2022 06:30 PM    CALCIUM 10.4 11/06/2022 06:30 PM    GFRAA 59 12/30/2021 11:28 AM    LABGLOM 48 11/06/2022 06:30 PM    GLUCOSE 91 11/06/2022 06:30 PM       Radiology Review:  No radiology needed at this time  Pathology Review: No Pathology reviewed       IMPRESSION/RECOMMENDATIONS:        Diagnosis  -) Seborrheic keratosis right popliteal fossa  -) Smoking addiction    -I informed the patient that as the lesion is benign in nature and not causing in her symptomatology including pain tenderness pruritus or getting caught in her clothing it would not be advisable to biopsy or destroy the lesion second to her morbid obesity, diabetes and smoking. I informed her that she may develop a wound and may ultimately cause her infection. She voiced understanding appreciation for assessment of the lesion today. I did discuss with the patient smoking cessation for her overall health I will refer her to smoking cessation classes as she is seeing her primary care physician tomorrow. ZULEYKA- PRN      This document is generated, in part, by voice recognition software and thus  syntax and grammatical errors are possible.     Nicholas Cano, [address]  8:54 AM  1/12/2023

## 2023-04-24 ENCOUNTER — APPOINTMENT (OUTPATIENT)
Dept: ULTRASOUND IMAGING | Age: 73
End: 2023-04-24
Payer: MEDICARE

## 2023-04-24 ENCOUNTER — APPOINTMENT (OUTPATIENT)
Dept: GENERAL RADIOLOGY | Age: 73
End: 2023-04-24
Payer: MEDICARE

## 2023-04-24 ENCOUNTER — HOSPITAL ENCOUNTER (EMERGENCY)
Age: 73
Discharge: HOME | End: 2023-04-24
Attending: EMERGENCY MEDICINE
Payer: MEDICARE

## 2023-04-24 VITALS
SYSTOLIC BLOOD PRESSURE: 143 MMHG | TEMPERATURE: 97.9 F | HEART RATE: 98 BPM | DIASTOLIC BLOOD PRESSURE: 92 MMHG | RESPIRATION RATE: 17 BRPM | OXYGEN SATURATION: 97 %

## 2023-04-24 DIAGNOSIS — M25.531 RIGHT WRIST PAIN: ICD-10-CM

## 2023-04-24 DIAGNOSIS — M10.9 ARTHRITIS OF RIGHT WRIST DUE TO GOUT: Primary | ICD-10-CM

## 2023-04-24 LAB
ANION GAP SERPL CALCULATED.3IONS-SCNC: 9 MMOL/L (ref 7–16)
BASOPHILS # BLD: 0.03 E9/L (ref 0–0.2)
BASOPHILS NFR BLD: 0.3 % (ref 0–2)
BUN SERPL-MCNC: 22 MG/DL (ref 6–23)
CALCIUM SERPL-MCNC: 9.3 MG/DL (ref 8.6–10.2)
CHLORIDE SERPL-SCNC: 105 MMOL/L (ref 98–107)
CO2 SERPL-SCNC: 24 MMOL/L (ref 22–29)
CREAT SERPL-MCNC: 1 MG/DL (ref 0.5–1)
EOSINOPHIL # BLD: 0.16 E9/L (ref 0.05–0.5)
EOSINOPHIL NFR BLD: 1.5 % (ref 0–6)
ERYTHROCYTE [DISTWIDTH] IN BLOOD BY AUTOMATED COUNT: 12.6 FL (ref 11.5–15)
GLUCOSE SERPL-MCNC: 96 MG/DL (ref 74–99)
HCT VFR BLD AUTO: 45.3 % (ref 34–48)
HGB BLD-MCNC: 15.2 G/DL (ref 11.5–15.5)
IMM GRANULOCYTES # BLD: 0.02 E9/L
IMM GRANULOCYTES NFR BLD: 0.2 % (ref 0–5)
LACTATE BLDV-SCNC: 1.1 MMOL/L (ref 0.5–2.2)
LYMPHOCYTES # BLD: 2.05 E9/L (ref 1.5–4)
LYMPHOCYTES NFR BLD: 19.8 % (ref 20–42)
MCH RBC QN AUTO: 32.3 PG (ref 26–35)
MCHC RBC AUTO-ENTMCNC: 33.6 % (ref 32–34.5)
MCV RBC AUTO: 96.4 FL (ref 80–99.9)
MONOCYTES # BLD: 0.7 E9/L (ref 0.1–0.95)
MONOCYTES NFR BLD: 6.8 % (ref 2–12)
NEUTROPHILS # BLD: 7.39 E9/L (ref 1.8–7.3)
NEUTS SEG NFR BLD: 71.4 % (ref 43–80)
PLATELET # BLD AUTO: 223 E9/L (ref 130–450)
PMV BLD AUTO: 10.3 FL (ref 7–12)
POTASSIUM SERPL-SCNC: 4.4 MMOL/L (ref 3.5–5)
RBC # BLD AUTO: 4.7 E12/L (ref 3.5–5.5)
SODIUM SERPL-SCNC: 138 MMOL/L (ref 132–146)
URATE SERPL-MCNC: 8.2 MG/DL (ref 2.4–5.7)
WBC # BLD: 10.4 E9/L (ref 4.5–11.5)

## 2023-04-24 PROCEDURE — 6370000000 HC RX 637 (ALT 250 FOR IP): Performed by: NURSE PRACTITIONER

## 2023-04-24 PROCEDURE — 83605 ASSAY OF LACTIC ACID: CPT

## 2023-04-24 PROCEDURE — 93971 EXTREMITY STUDY: CPT

## 2023-04-24 PROCEDURE — 84550 ASSAY OF BLOOD/URIC ACID: CPT

## 2023-04-24 PROCEDURE — 80048 BASIC METABOLIC PNL TOTAL CA: CPT

## 2023-04-24 PROCEDURE — 6370000000 HC RX 637 (ALT 250 FOR IP): Performed by: EMERGENCY MEDICINE

## 2023-04-24 PROCEDURE — 99284 EMERGENCY DEPT VISIT MOD MDM: CPT

## 2023-04-24 PROCEDURE — 85025 COMPLETE CBC W/AUTO DIFF WBC: CPT

## 2023-04-24 PROCEDURE — 73130 X-RAY EXAM OF HAND: CPT

## 2023-04-24 PROCEDURE — 73110 X-RAY EXAM OF WRIST: CPT

## 2023-04-24 RX ORDER — COLCHICINE 0.6 MG/1
1.2 TABLET ORAL ONCE
Status: COMPLETED | OUTPATIENT
Start: 2023-04-24 | End: 2023-04-24

## 2023-04-24 RX ORDER — OXYCODONE HYDROCHLORIDE 5 MG/1
5 TABLET ORAL ONCE
Status: COMPLETED | OUTPATIENT
Start: 2023-04-24 | End: 2023-04-24

## 2023-04-24 RX ORDER — ACETAMINOPHEN 325 MG/1
650 TABLET ORAL ONCE
Status: COMPLETED | OUTPATIENT
Start: 2023-04-24 | End: 2023-04-24

## 2023-04-24 RX ORDER — TETANUS AND DIPHTHERIA TOXOIDS ADSORBED 2; 2 [LF]/.5ML; [LF]/.5ML
0.5 INJECTION INTRAMUSCULAR ONCE
Status: DISCONTINUED | OUTPATIENT
Start: 2023-04-24 | End: 2023-04-25 | Stop reason: HOSPADM

## 2023-04-24 RX ORDER — OXYCODONE HYDROCHLORIDE 5 MG/1
5 TABLET ORAL EVERY 4 HOURS PRN
Qty: 6 TABLET | Refills: 0 | Status: SHIPPED | OUTPATIENT
Start: 2023-04-24 | End: 2023-04-26

## 2023-04-24 RX ORDER — COLCHICINE 0.6 MG/1
0.6 CAPSULE ORAL DAILY
Qty: 14 CAPSULE | Refills: 0 | Status: SHIPPED | OUTPATIENT
Start: 2023-04-24 | End: 2023-05-08

## 2023-04-24 RX ADMIN — ACETAMINOPHEN 650 MG: 325 TABLET ORAL at 19:56

## 2023-04-24 RX ADMIN — OXYCODONE 5 MG: 5 TABLET ORAL at 22:25

## 2023-04-24 RX ADMIN — COLCHICINE 1.2 MG: 0.6 TABLET ORAL at 23:13

## 2023-04-24 ASSESSMENT — PAIN DESCRIPTION - LOCATION
LOCATION: HAND;ARM

## 2023-04-24 ASSESSMENT — PAIN DESCRIPTION - ORIENTATION
ORIENTATION: RIGHT

## 2023-04-24 ASSESSMENT — PAIN DESCRIPTION - PAIN TYPE: TYPE: ACUTE PAIN

## 2023-04-24 ASSESSMENT — PAIN DESCRIPTION - DESCRIPTORS
DESCRIPTORS: ACHING

## 2023-04-24 ASSESSMENT — PAIN SCALES - GENERAL
PAINLEVEL_OUTOF10: 10
PAINLEVEL_OUTOF10: 10
PAINLEVEL_OUTOF10: 8
PAINLEVEL_OUTOF10: 10
PAINLEVEL_OUTOF10: 7

## 2023-04-24 ASSESSMENT — LIFESTYLE VARIABLES
HOW MANY STANDARD DRINKS CONTAINING ALCOHOL DO YOU HAVE ON A TYPICAL DAY: PATIENT DOES NOT DRINK
HOW OFTEN DO YOU HAVE A DRINK CONTAINING ALCOHOL: NEVER

## 2023-04-24 ASSESSMENT — PAIN DESCRIPTION - FREQUENCY: FREQUENCY: CONTINUOUS

## 2023-04-24 NOTE — ED PROVIDER NOTES
Shared CHINO-ED Attending Visit. CC: No          [facility]  Department of Emergency Medicine   ED  Encounter Note  Admit Date/RoomTime: 2023  6:50 PM  ED Room:     NAME: Natalia Fish  : 1950  MRN: [id number]     Chief Complaint:  Hand Pain and Swelling    History of Present Illness       Natalia Fish is a 68 y.o. old female who presents to the emergency department by ambulance, for pain in right wrist and dorsal hand and radiates up her right arm with redness warmth to the right radial wrist and states she cannot move the wrist for the past 2 days. She reports a history of having carpal tunnel syndrome in the past.  She has a small superficial scabbed abrasion to the ulnar side of the wrist and states she hit it with a nail a week ago when she was using a hammer. She denies any fever, chills, chest pain, shortness of breath, palpitations, numbness or weakness. Patient  has a prior history of pain to mentioned area related to today's visit. She is right handed. The patients tetanus status is 2016. Since onset the symptoms have been persistent and gradually worsening. Her pain is aggraveated by any movement or pressure on or palpation of painful area and relieved by nothing. She denies any numbness, weakness, fever, or chills. Patient on Eliquis. Patient has 2 dogs at home denies any dog bites. **Informed Consent**    The patient has given verbal consent to have photos taken of right wrist and electronically inserted into their ED Provider Note as part of their permanent medical record for purposes of illustration, documentation, treatment management and/or medical review. All Images taken on 23 of patient name: Natalia Fish were taken by a [address] [facility] approved registered mobile device via [employer] mobile application and transmitted then stored on a secured [employer] Site located within [facility].         2

## 2023-04-24 NOTE — ED TRIAGE NOTES
Pt arrives to ED via EMS for c/o right hand swelling/pain for the last 2 days. Pt reports the pain goes from her hand all the way up her arm almost to her shoulder. Pt is A&Ox4, skin w/d, resp even/unlabored.

## 2023-04-25 NOTE — ED NOTES
Discharge instructions discussed with pt at bedside. Pt voices understanding of instructions including medications and follow-up care. Pt voices no questions or concerns at this time and leaves ED via wheelchair in no acute distress.       Ann Kee RN  04/24/23 [id number]

## 2023-06-13 LAB
CHOLESTEROL, TOTAL: NORMAL
CHOLESTEROL/HDL RATIO: NORMAL
HDLC SERPL-MCNC: NORMAL MG/DL
LDL CHOLESTEROL CALCULATED: NORMAL
NONHDLC SERPL-MCNC: NORMAL MG/DL
TRIGL SERPL-MCNC: NORMAL MG/DL
VLDLC SERPL CALC-MCNC: NORMAL MG/DL

## 2023-08-10 ENCOUNTER — TELEPHONE (OUTPATIENT)
Dept: CARDIOLOGY CLINIC | Age: 73
End: 2023-08-10

## 2023-08-10 NOTE — TELEPHONE ENCOUNTER
Per verbal from Dr. Bird Sanchez, I scheduled patient for F/U on 9/6/23 at 9:40 for a yearly F/U and to assess for colonoscopy. She was notified that she will need to reschedule her colonoscopy.

## 2023-08-16 ENCOUNTER — HOSPITAL ENCOUNTER (OUTPATIENT)
Dept: GENERAL RADIOLOGY | Age: 73
Discharge: HOME | End: 2023-08-18
Payer: MEDICAID

## 2023-08-16 VITALS — WEIGHT: 293 LBS | HEIGHT: 65 IN | BODY MASS INDEX: 48.82 KG/M2

## 2023-08-16 DIAGNOSIS — Z12.31 ENCOUNTER FOR SCREENING MAMMOGRAM FOR MALIGNANT NEOPLASM OF BREAST: ICD-10-CM

## 2023-08-16 PROCEDURE — 77063 BREAST TOMOSYNTHESIS BI: CPT

## 2023-08-24 ENCOUNTER — OFFICE VISIT (OUTPATIENT)
Dept: PAIN MANAGEMENT | Age: 73
End: 2023-08-24
Payer: MEDICAID

## 2023-08-24 VITALS
BODY MASS INDEX: 48.82 KG/M2 | TEMPERATURE: 97.5 F | WEIGHT: 293 LBS | RESPIRATION RATE: 18 BRPM | DIASTOLIC BLOOD PRESSURE: 69 MMHG | HEART RATE: 90 BPM | OXYGEN SATURATION: 95 % | SYSTOLIC BLOOD PRESSURE: 106 MMHG | HEIGHT: 65 IN

## 2023-08-24 DIAGNOSIS — M47.817 LUMBOSACRAL SPONDYLOSIS WITHOUT MYELOPATHY: ICD-10-CM

## 2023-08-24 DIAGNOSIS — M51.36 DDD (DEGENERATIVE DISC DISEASE), LUMBAR: ICD-10-CM

## 2023-08-24 DIAGNOSIS — M47.812 CERVICAL SPONDYLOSIS: ICD-10-CM

## 2023-08-24 DIAGNOSIS — E66.9 OBESITY, UNSPECIFIED CLASSIFICATION, UNSPECIFIED OBESITY TYPE, UNSPECIFIED WHETHER SERIOUS COMORBIDITY PRESENT: ICD-10-CM

## 2023-08-24 DIAGNOSIS — G89.4 CHRONIC PAIN SYNDROME: Primary | ICD-10-CM

## 2023-08-24 DIAGNOSIS — M50.30 DDD (DEGENERATIVE DISC DISEASE), CERVICAL: ICD-10-CM

## 2023-08-24 DIAGNOSIS — R52 DIFFUSE PAIN: ICD-10-CM

## 2023-08-24 PROCEDURE — G8399 PT W/DXA RESULTS DOCUMENT: HCPCS | Performed by: ANESTHESIOLOGY

## 2023-08-24 PROCEDURE — 99204 OFFICE O/P NEW MOD 45 MIN: CPT | Performed by: ANESTHESIOLOGY

## 2023-08-24 PROCEDURE — 1123F ACP DISCUSS/DSCN MKR DOCD: CPT | Performed by: ANESTHESIOLOGY

## 2023-08-24 PROCEDURE — G8427 DOCREV CUR MEDS BY ELIG CLIN: HCPCS | Performed by: ANESTHESIOLOGY

## 2023-08-24 PROCEDURE — 3017F COLORECTAL CA SCREEN DOC REV: CPT | Performed by: ANESTHESIOLOGY

## 2023-08-24 PROCEDURE — G8417 CALC BMI ABV UP PARAM F/U: HCPCS | Performed by: ANESTHESIOLOGY

## 2023-08-24 PROCEDURE — 4004F PT TOBACCO SCREEN RCVD TLK: CPT | Performed by: ANESTHESIOLOGY

## 2023-08-24 PROCEDURE — 1090F PRES/ABSN URINE INCON ASSESS: CPT | Performed by: ANESTHESIOLOGY

## 2023-08-24 NOTE — PROGRESS NOTES
[facility]        [address] [address]  Dept: [phone]          Consult Note      Patient:  Catarina Powers,  1950    Date of Service:  23     Requesting Physician:  Nat Ocasio MD    Reason for Consult:      Patient presents with complaints of chronic diffuse pain    HISTORY OF PRESENT ILLNESS:      Ms. Catarina Powers is a 68 y.o. female presented today to [address] [place] [address] for evaluation of  chronic diffuse pain. Neck pain. Low back pain, diffuse joint pain for > 25 yrs. H/o morbid obesity +    Prior treatment at different pain clinic- was on chronic opioids- Dc'd due to failed UDS ( apparently prescribed meds- did not show in the UDS). Most recently has been evaluated by pain center in [facility]. She did not like it there. Pain is constant and is described as aching and throbbing. Pain causes functional limitations/ limits Adl's : Yes    S/P bariatric surgery- has lost significant weight - but still has BMI > 50. Previous treatments:   Physical Therapy : yes,     Medications: - NSAID's : yes             - Membrane stabilizers : yes             - Opioids : yes,            - Adjuvants or Others : yes    She has been on anticoagulation medications yes,  and include ASA and Eliquis. H/O Smoking: yes  H/O alcohol abuse : no  H/O Illicit drug use : no    Imaging:   MRI of LS spein: 2021:        MRI C spien: 2021:    Past Medical History:   Diagnosis Date    Arthritis     Cerebral artery occlusion with cerebral infarction ([address]) ?    tia     Cigarette nicotine dependence without complication 6/15/5332    COPD (chronic obstructive pulmonary disease) ([address])     Depression 10/2014    \"doing ok\". anxiety    Diabetes mellitus ([address])     prediabetic    GERD (gastroesophageal reflux disease)     Hyperlipidemia     Hypertension     Hypothyroidism     Obese 10/2014    states weight aprox. 400 lbs.     SHALA
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
## 2023-08-26 ENCOUNTER — HOSPITAL ENCOUNTER (EMERGENCY)
Age: 73
Discharge: LEFT BEFORE DISCHARGE | End: 2023-08-26
Payer: MEDICAID

## 2023-08-26 ENCOUNTER — APPOINTMENT (OUTPATIENT)
Dept: GENERAL RADIOLOGY | Age: 73
End: 2023-08-26
Payer: MEDICAID

## 2023-08-26 VITALS
WEIGHT: 293 LBS | HEIGHT: 65 IN | HEART RATE: 106 BPM | TEMPERATURE: 98.2 F | OXYGEN SATURATION: 98 % | BODY MASS INDEX: 48.82 KG/M2 | SYSTOLIC BLOOD PRESSURE: 123 MMHG | DIASTOLIC BLOOD PRESSURE: 86 MMHG | RESPIRATION RATE: 18 BRPM

## 2023-08-26 DIAGNOSIS — S93.502A SPRAIN OF GREAT TOE OF LEFT FOOT, INITIAL ENCOUNTER: Primary | ICD-10-CM

## 2023-08-26 PROCEDURE — 73630 X-RAY EXAM OF FOOT: CPT

## 2023-08-26 PROCEDURE — 99283 EMERGENCY DEPT VISIT LOW MDM: CPT

## 2023-08-26 RX ORDER — HYDROCODONE BITARTRATE AND ACETAMINOPHEN 5; 325 MG/1; MG/1
1 TABLET ORAL ONCE
Status: DISCONTINUED | OUTPATIENT
Start: 2023-08-26 | End: 2023-08-27 | Stop reason: HOSPADM

## 2023-08-26 ASSESSMENT — PAIN SCALES - GENERAL: PAINLEVEL_OUTOF10: 10

## 2023-08-27 ENCOUNTER — HOSPITAL ENCOUNTER (EMERGENCY)
Age: 73
Discharge: HOME | End: 2023-08-27
Payer: MEDICAID

## 2023-08-27 VITALS
TEMPERATURE: 97.8 F | HEART RATE: 95 BPM | RESPIRATION RATE: 20 BRPM | SYSTOLIC BLOOD PRESSURE: 140 MMHG | DIASTOLIC BLOOD PRESSURE: 80 MMHG | OXYGEN SATURATION: 96 %

## 2023-08-27 DIAGNOSIS — S90.112A CONTUSION OF LEFT GREAT TOE WITHOUT DAMAGE TO NAIL, INITIAL ENCOUNTER: Primary | ICD-10-CM

## 2023-08-27 PROCEDURE — 6370000000 HC RX 637 (ALT 250 FOR IP): Performed by: NURSE PRACTITIONER

## 2023-08-27 PROCEDURE — 99283 EMERGENCY DEPT VISIT LOW MDM: CPT

## 2023-08-27 RX ORDER — OXYCODONE HYDROCHLORIDE AND ACETAMINOPHEN 5; 325 MG/1; MG/1
1 TABLET ORAL EVERY 6 HOURS PRN
Qty: 12 TABLET | Refills: 0 | Status: SHIPPED | OUTPATIENT
Start: 2023-08-27 | End: 2023-08-30

## 2023-08-27 RX ORDER — OXYCODONE HYDROCHLORIDE AND ACETAMINOPHEN 5; 325 MG/1; MG/1
1 TABLET ORAL ONCE
Status: COMPLETED | OUTPATIENT
Start: 2023-08-27 | End: 2023-08-27

## 2023-08-27 RX ADMIN — OXYCODONE AND ACETAMINOPHEN 1 TABLET: 325; 5 TABLET ORAL at 18:17

## 2023-08-27 ASSESSMENT — PAIN DESCRIPTION - FREQUENCY: FREQUENCY: CONTINUOUS

## 2023-08-27 ASSESSMENT — PAIN DESCRIPTION - ORIENTATION
ORIENTATION: LEFT

## 2023-08-27 ASSESSMENT — PAIN SCALES - GENERAL
PAINLEVEL_OUTOF10: 10

## 2023-08-27 ASSESSMENT — PAIN DESCRIPTION - DESCRIPTORS
DESCRIPTORS: ACHING;THROBBING;DISCOMFORT
DESCRIPTORS: ACHING;SHARP
DESCRIPTORS: THROBBING;DISCOMFORT;ACHING

## 2023-08-27 ASSESSMENT — PAIN DESCRIPTION - ONSET: ONSET: SUDDEN

## 2023-08-27 ASSESSMENT — PAIN DESCRIPTION - LOCATION
LOCATION: FOOT;TOE (COMMENT WHICH ONE)
LOCATION: FOOT
LOCATION: FOOT

## 2023-08-27 ASSESSMENT — PAIN DESCRIPTION - PAIN TYPE: TYPE: ACUTE PAIN

## 2023-08-27 ASSESSMENT — PAIN - FUNCTIONAL ASSESSMENT: PAIN_FUNCTIONAL_ASSESSMENT: 0-10

## 2023-08-27 NOTE — ED NOTES
This Rn is with another pt at the desk and the pt is wheeled by and screams at this  RN \"Im leaving\". This Rn was unable to assist this pt at this time as he was assisting another pt.  Pt was wheeled out of the [address] [place] [address], RN  08/26/23 0914

## 2023-08-27 NOTE — DISCHARGE INSTRUCTIONS
Please follow-up with your podiatrist in [facility] regarding the pain in your foot, I reviewed the x-rays from last night, there are no fractures, all of your hardware is in place. Please wear the orthopedic shoe whenever you are up, you may remove it at rest.  Please use care whenever using crutches, I am afraid that you might fall with these. You may be better off using your walker.

## 2023-08-27 NOTE — ED PROVIDER NOTES
care set in place by the physicians assistant that saw patient at [facility] prior to her elopement. There is no evidence of new fracture, patient has intact hardware along the first metatarsal overlying the hallux. There is no evidence of disruption of this hardware. I discussed these findings with patient, discussed with patient that she will be placed in orthopedic shoe, patient is requesting crutches, given patient's body habitus with a BMI of 53.42 and evidence of chronic debility I have advised patient that crutches may not be in her best interest.  Patient states that she can \"handle crutches just fine\". She does state that she has a walker at home I advised that she may consider using the walker instead of crutches as I feel they may place her at a greater risk of falls, she declines this and is insistent on getting crutches. PDMP was queried, patient has no evidence of potential for abuse, prescription for Percocet was sent to patient's pharmacy. Patient was placed in orthopedic shoe and was provided with crutches. Patient was advised that she will need follow-up with podiatry, she states that she does not want to see a podiatrist in the [facility] and has a podiatrist in [place] but does not remember his name. I cannot find any previous records from this podiatrist.  I have advised patient that she should call the podiatrist in the morning should symptoms not improve. Patient was given strict return to ER precautions. Patient was explicitly instructed on specific signs and symptoms on which to return to the emergency room for. Patient was instructed to return to the ER for any new or worsening symptoms. Additional discharge instructions were given verbally. All questions were answered. Patient is comfortable and agreeable with discharge plan. Patient in no acute distress and non-toxic in appearance.      .    Plan of Care/Counseling:  BRENDA Chance - CNP reviewed today's visit with

## 2023-08-27 NOTE — ED PROVIDER NOTES
Independent [facility] Visit. [facility]   ED  Encounter Note  Admit Date/RoomTime: 2023  9:49 PM  ED Room: JONATHAN/JONATHAN    NAME: Kaykay Montana  : 1950  MRN: [id number]     Chief Complaint:  Toe Pain (Pt electric scooter ran into a porch swing and twisted her toes in her boot through steel toe shoes)    History of Present Illness         Kaykay Montana is a 68 y.o. old female presenting to the emergency department by private vehicle, for traumatic Left toe pain which occured 2 hour(s) prior to arrival.  The complaint is due to hit toe on porch swing while in her PMV. Patient  has a prior history of pain to mentioned area related to today's visit, Patient has a prior history of surgery of the mentioned area related to today's visit, surgical reconstruction of foot after hammer toe surgeries. .  Since onset the symptoms have been persistent with barely able to bear weight due to pain. Her pain is aggraveated by any movement or pressure on or palpation of painful area and relieved by nothing, as no treatment has been provided prior to this visit. She denies any numbness, weakness, wounds, or rash. Tetanus Status: up to date. ROS   Pertinent positives and negatives are stated within HPI, all other systems reviewed and are negative. Past Medical History:  has a past medical history of Arthritis, Cerebral artery occlusion with cerebral infarction ([address]), Cigarette nicotine dependence without complication, COPD (chronic obstructive pulmonary disease) ([address]), Depression, Diabetes mellitus ([address]), GERD (gastroesophageal reflux disease), Hyperlipidemia, Hypertension, Hypothyroidism, Obese, SHALA (obstructive sleep apnea), and Palate mass. Surgical History:  has a past surgical history that includes Hysterectomy; Foot surgery (Right, 2009); Appendectomy;  Tonsillectomy; Colonoscopy; Endoscopy, colon, diagnostic; Carpal tunnel release (Bilateral); other

## 2023-09-21 ENCOUNTER — OFFICE VISIT (OUTPATIENT)
Dept: CARDIOLOGY CLINIC | Age: 73
End: 2023-09-21
Payer: MEDICARE

## 2023-09-21 VITALS
HEIGHT: 65 IN | BODY MASS INDEX: 48.82 KG/M2 | DIASTOLIC BLOOD PRESSURE: 78 MMHG | WEIGHT: 293 LBS | SYSTOLIC BLOOD PRESSURE: 102 MMHG | RESPIRATION RATE: 14 BRPM | HEART RATE: 74 BPM

## 2023-09-21 DIAGNOSIS — Z01.810 PREOP CARDIOVASCULAR EXAM: Primary | ICD-10-CM

## 2023-09-21 DIAGNOSIS — I10 ESSENTIAL HYPERTENSION: ICD-10-CM

## 2023-09-21 DIAGNOSIS — I48.0 PAF (PAROXYSMAL ATRIAL FIBRILLATION) (HCC): ICD-10-CM

## 2023-09-21 PROCEDURE — 1090F PRES/ABSN URINE INCON ASSESS: CPT | Performed by: INTERNAL MEDICINE

## 2023-09-21 PROCEDURE — 1123F ACP DISCUSS/DSCN MKR DOCD: CPT | Performed by: INTERNAL MEDICINE

## 2023-09-21 PROCEDURE — G8417 CALC BMI ABV UP PARAM F/U: HCPCS | Performed by: INTERNAL MEDICINE

## 2023-09-21 PROCEDURE — G8427 DOCREV CUR MEDS BY ELIG CLIN: HCPCS | Performed by: INTERNAL MEDICINE

## 2023-09-21 PROCEDURE — 3074F SYST BP LT 130 MM HG: CPT | Performed by: INTERNAL MEDICINE

## 2023-09-21 PROCEDURE — 93000 ELECTROCARDIOGRAM COMPLETE: CPT | Performed by: INTERNAL MEDICINE

## 2023-09-21 PROCEDURE — G8399 PT W/DXA RESULTS DOCUMENT: HCPCS | Performed by: INTERNAL MEDICINE

## 2023-09-21 PROCEDURE — 99214 OFFICE O/P EST MOD 30 MIN: CPT | Performed by: INTERNAL MEDICINE

## 2023-09-21 PROCEDURE — 3017F COLORECTAL CA SCREEN DOC REV: CPT | Performed by: INTERNAL MEDICINE

## 2023-09-21 PROCEDURE — 3078F DIAST BP <80 MM HG: CPT | Performed by: INTERNAL MEDICINE

## 2023-09-21 PROCEDURE — 4004F PT TOBACCO SCREEN RCVD TLK: CPT | Performed by: INTERNAL MEDICINE

## 2023-09-21 RX ORDER — OLOPATADINE HYDROCHLORIDE 1 MG/ML
SOLUTION/ DROPS OPHTHALMIC
COMMUNITY
Start: 2023-06-13

## 2023-09-21 RX ORDER — LEVOTHYROXINE SODIUM 0.15 MG/1
TABLET ORAL
COMMUNITY
Start: 2023-08-30

## 2023-09-21 RX ORDER — GABAPENTIN 100 MG/1
CAPSULE ORAL
COMMUNITY
Start: 2023-08-28

## 2023-09-21 RX ORDER — LORATADINE 10 MG/1
TABLET ORAL
COMMUNITY
Start: 2023-08-14

## 2023-09-21 RX ORDER — ALLOPURINOL 100 MG/1
TABLET ORAL
COMMUNITY
Start: 2023-08-19

## 2023-09-21 RX ORDER — NICOTINE 21 MG/24HR
PATCH, TRANSDERMAL 24 HOURS TRANSDERMAL
COMMUNITY
Start: 2023-07-28

## 2023-09-21 RX ORDER — LOSARTAN POTASSIUM 25 MG/1
TABLET ORAL
COMMUNITY
Start: 2023-07-27

## 2023-09-21 RX ORDER — FLUTICASONE FUROATE, UMECLIDINIUM BROMIDE AND VILANTEROL TRIFENATATE 100; 62.5; 25 UG/1; UG/1; UG/1
1 POWDER RESPIRATORY (INHALATION)
COMMUNITY

## 2023-09-21 RX ORDER — ASPIRIN 81 MG
TABLET, DELAYED RELEASE (ENTERIC COATED) ORAL
COMMUNITY
Start: 2023-08-14

## 2023-09-21 NOTE — PROGRESS NOTES
present  ABDOMEN: Soft without hepatic or splenic enlargement. No tenderness. MUSCULOSKELETAL: No kyphosis or scoliosis of the back. Good muscle strength and tone. No muscle atrophy. Slow gait and inability to undergo exercise stress testing. EXTREMITIES: No clubbing or cyanosis. SKIN: No Xanthomas or ulcerations. NEUROLOGIC: Oriented to time, place and person. Normal mood and affect. LYMPHATIC:  No palpable neck or supraclavicular nodes. No splenomegaly. EKG: the EKG tracing was reviewed and found to reveal: Atrial fibrillation. QTc 400 ms. No change compared to prior tracing. ASSESSMENT:                                                     ORDERS:       Diagnosis Orders   1. Preop cardiovascular exam  EKG 12 lead      2. PAF (paroxysmal atrial fibrillation) (HCC)  EKG 12 lead      3. Essential hypertension  EKG 12 lead        Above assessment cardiac issues stable/RCRI class II with 0.9% risk for major adverse cardiac event    PLAN:   See above orders. Medication reconciliation completed. Labs reviewed: Creatinine 1.0   Records reviewed: EF 64%  Discussed issues that would prompt earlier evaluation. Letter of preoperative risk stratification dictated. Same cardiac medications. Follow-up office visit in 1 year.

## 2023-10-24 NOTE — PROGRESS NOTES
Pt called to cancel for 11/1. Has cast on foot which will be changed every week. She will call us back to reschedule.  [facility]'s office notified via fax

## 2023-11-01 ENCOUNTER — HOSPITAL ENCOUNTER (OUTPATIENT)
Dept: SLEEP CENTER | Age: 73
Discharge: HOME | End: 2023-11-01

## 2024-04-15 ENCOUNTER — APPOINTMENT (OUTPATIENT)
Dept: GENERAL RADIOLOGY | Age: 74
End: 2024-04-15
Payer: MEDICARE

## 2024-04-15 ENCOUNTER — HOSPITAL ENCOUNTER (EMERGENCY)
Age: 74
Discharge: HOME | End: 2024-04-15
Attending: EMERGENCY MEDICINE
Payer: MEDICARE

## 2024-04-15 VITALS
BODY MASS INDEX: 48.15 KG/M2 | TEMPERATURE: 97.8 F | OXYGEN SATURATION: 98 % | WEIGHT: 289 LBS | DIASTOLIC BLOOD PRESSURE: 66 MMHG | HEART RATE: 88 BPM | SYSTOLIC BLOOD PRESSURE: 103 MMHG | HEIGHT: 65 IN | RESPIRATION RATE: 16 BRPM

## 2024-04-15 DIAGNOSIS — S80.12XA HEMATOMA OF LEFT LOWER EXTREMITY, INITIAL ENCOUNTER: ICD-10-CM

## 2024-04-15 DIAGNOSIS — S80.12XA CONTUSION OF LEFT LOWER EXTREMITY, INITIAL ENCOUNTER: Primary | ICD-10-CM

## 2024-04-15 PROCEDURE — 99283 EMERGENCY DEPT VISIT LOW MDM: CPT

## 2024-04-15 PROCEDURE — 6370000000 HC RX 637 (ALT 250 FOR IP): Performed by: EMERGENCY MEDICINE

## 2024-04-15 PROCEDURE — 6370000000 HC RX 637 (ALT 250 FOR IP)

## 2024-04-15 PROCEDURE — 73590 X-RAY EXAM OF LOWER LEG: CPT

## 2024-04-15 RX ORDER — OXYCODONE HYDROCHLORIDE 5 MG/1
5 TABLET ORAL ONCE
Status: COMPLETED | OUTPATIENT
Start: 2024-04-15 | End: 2024-04-15

## 2024-04-15 RX ORDER — TRAMADOL HYDROCHLORIDE 50 MG/1
50 TABLET ORAL ONCE
Status: COMPLETED | OUTPATIENT
Start: 2024-04-15 | End: 2024-04-15

## 2024-04-15 RX ADMIN — OXYCODONE 5 MG: 5 TABLET ORAL at 18:36

## 2024-04-15 RX ADMIN — TRAMADOL HYDROCHLORIDE 50 MG: 50 TABLET, COATED ORAL at 20:02

## 2024-04-15 ASSESSMENT — ENCOUNTER SYMPTOMS
BACK PAIN: 0
CHEST TIGHTNESS: 0
SHORTNESS OF BREATH: 0
NAUSEA: 0
DIARRHEA: 0
ABDOMINAL PAIN: 0
PHOTOPHOBIA: 0
COUGH: 0
VOMITING: 0
SORE THROAT: 0
RHINORRHEA: 0

## 2024-04-15 ASSESSMENT — PAIN DESCRIPTION - ORIENTATION: ORIENTATION: LEFT;LOWER

## 2024-04-15 ASSESSMENT — PAIN - FUNCTIONAL ASSESSMENT: PAIN_FUNCTIONAL_ASSESSMENT: 0-10

## 2024-04-15 ASSESSMENT — PAIN DESCRIPTION - LOCATION: LOCATION: LEG

## 2024-04-15 ASSESSMENT — PAIN SCALES - GENERAL: PAINLEVEL_OUTOF10: 10

## 2024-04-15 NOTE — ED PROVIDER NOTES
17 years ago    COLONOSCOPY      ENDOSCOPY, COLON, DIAGNOSTIC      FOOT SURGERY Right 2009    FOOT SURGERY  2014    reconstructive for hammer toes all four, not big toe left foot    HAMMER TOE SURGERY Left 03/06/2017    HYSTERECTOMY (CERVIX STATUS UNKNOWN)      JOINT REPLACEMENT  bilat knee    OTHER SURGICAL HISTORY Left 11/5/2014    hammer toe correction 2-4left foot and hardware;left calcaneous x2,flexor digital longis repair    TONSILLECTOMY         CURRENTMEDICATIONS       Discharge Medication List as of 4/15/2024 10:01 PM        CONTINUE these medications which have NOT CHANGED    Details   allopurinol (ZYLOPRIM) 100 MG tablet Historical Med      QC LO-DOSE ASPIRIN 81 MG EC tablet DAWHistorical Med      fluticasone-umeclidin-vilant (TRELEGY ELLIPTA) 100-62.5-25 MCG/ACT AEPB inhaler 1 puffHistorical Med      gabapentin (NEURONTIN) 100 MG capsule Historical Med      levothyroxine (SYNTHROID) 150 MCG tablet Historical Med      loratadine (CLARITIN) 10 MG tablet Historical Med      losartan (COZAAR) 25 MG tablet Historical Med      nicotine (NICODERM CQ) 14 MG/24HR Historical Med      olopatadine (PATANOL) 0.1 % ophthalmic solution INSTILL 1 DROP INTO AFFECTED EYE(S) BY OPHTHALMIC ROUTE 2 TIMES PER DAY AT AN INTERVAL OF 6 TO 8 HOURSHistorical Med      acetaminophen (TYLENOL) 500 MG CAPS capsule Take 1 capsule by mouth every 6 hours as needed for PainHistorical Med      cyclobenzaprine (FLEXERIL) 10 MG tablet Take 1 tablet by mouth 2 times daily as neededHistorical Med      ELIQUIS 5 MG TABS tablet Take 1 tablet by mouth 2 times daily, Disp-180 tablet, R-3, DAWNormal      famotidine (PEPCID) 20 MG tablet Take 1 tablet by mouth 2 times dailyHistorical Med      ferrous sulfate (IRON 325) 325 (65 Fe) MG tablet Take 1 tablet by mouth daily (with breakfast)Historical Med      albuterol (PROVENTIL) (2.5 MG/3ML) 0.083% nebulizer solution Take 3 mLs by nebulization daily as needed for WheezingHistorical Med      vitamin

## 2024-04-16 NOTE — DISCHARGE INSTR - COC
Continuity of Care Form    Patient Name: Olimpia Narayan   :  1950  MRN:  [id number]    Admit date:  4/15/2024  Discharge date:  ***    Code Status Order: Prior   Advance Directives:     Admitting Physician:  No admitting provider for patient encounter.  PCP: Vitaliy Thayer MD    Discharging Nurse: ***  Discharging Hospital Unit/Room#:   Discharging Unit Phone Number: ***    Emergency Contact:   Extended Emergency Contact Information  Primary Emergency Contact: yesi narayan  Home Phone: [phone]  Mobile Phone: [phone]  Relation: Grandchild  Secondary Emergency Contact: Clarence Lopez  Mobile Phone: [phone]  Relation: Grandchild    Past Surgical History:  Past Surgical History:   Procedure Laterality Date    APPENDECTOMY      CARPAL TUNNEL RELEASE Bilateral     17 years ago    COLONOSCOPY      ENDOSCOPY, COLON, DIAGNOSTIC      FOOT SURGERY Right     FOOT SURGERY      reconstructive for hammer toes all four, not big toe left foot    HAMMER TOE SURGERY Left 2017    HYSTERECTOMY (CERVIX STATUS UNKNOWN)      JOINT REPLACEMENT  bilat knee    OTHER SURGICAL HISTORY Left 2014    hammer toe correction 2-4left foot and hardware;left calcaneous x2,flexor digital longis repair    TONSILLECTOMY         Immunization History:   There is no immunization history for the selected administration types on file for this patient.    Active Problems:  Patient Active Problem List   Diagnosis Code    SHALA on CPAP G47.33    Obesity hypoventilation syndrome ([facility]) E66.2    Morbid obesity ([facility]) E66.01    COPD (chronic obstructive pulmonary disease) ([facility]) J44.9    Hyperlipidemia E78.5    Acquired hypothyroidism E03.9    DM (diabetes mellitus), type 2 ([facility]) E11.9    PAF (paroxysmal atrial fibrillation) ([facility]) I48.0    COVID-19 U07.1    Acute respiratory failure with hypoxia ([facility]) J96.01    Pneumonia due to COVID-19 virus U07.1, J12.82    Class 3 severe obesity with body mass index (BMI) of 50.0 to

## 2024-05-09 RX ORDER — MONTELUKAST SODIUM 10 MG/1
10 TABLET ORAL NIGHTLY
COMMUNITY

## 2024-05-09 RX ORDER — FERROUS SULFATE, DRIED 160(50) MG
1 TABLET, EXTENDED RELEASE ORAL DAILY
COMMUNITY

## 2024-05-09 RX ORDER — LEVOTHYROXINE SODIUM 150 UG/1
150 TABLET ORAL
COMMUNITY
Start: 2023-08-30

## 2024-05-09 RX ORDER — NAPROXEN SODIUM 220 MG/1
81 TABLET, FILM COATED ORAL DAILY
COMMUNITY

## 2024-05-09 RX ORDER — LEVOTHYROXINE SODIUM 100 UG/1
100 TABLET ORAL
COMMUNITY

## 2024-05-09 RX ORDER — CYCLOBENZAPRINE HCL 10 MG
10 TABLET ORAL 3 TIMES DAILY PRN
COMMUNITY

## 2024-05-09 RX ORDER — ATORVASTATIN CALCIUM 20 MG/1
20 TABLET, FILM COATED ORAL DAILY
COMMUNITY
Start: 2024-01-15

## 2024-05-09 RX ORDER — DOCUSATE SODIUM 100 MG/1
100 CAPSULE, LIQUID FILLED ORAL DAILY
COMMUNITY

## 2024-05-09 RX ORDER — GABAPENTIN 100 MG/1
100 CAPSULE ORAL 3 TIMES DAILY
COMMUNITY
Start: 2023-08-28

## 2024-05-09 RX ORDER — TRAMADOL HYDROCHLORIDE 50 MG/1
TABLET ORAL EVERY 8 HOURS PRN
COMMUNITY
Start: 2024-05-07 | End: 2024-05-12

## 2024-05-09 RX ORDER — CETIRIZINE HYDROCHLORIDE 10 MG/1
10 TABLET ORAL DAILY
COMMUNITY
Start: 2024-02-20

## 2024-05-09 RX ORDER — ACETAMINOPHEN 500 MG/1
1000 CAPSULE, LIQUID FILLED ORAL EVERY 6 HOURS PRN
COMMUNITY

## 2024-05-09 RX ORDER — ALLOPURINOL 100 MG/1
100 TABLET ORAL DAILY
COMMUNITY
Start: 2023-08-19

## 2024-05-09 RX ORDER — GUAIFENESIN 600 MG/1
600 TABLET, EXTENDED RELEASE ORAL 2 TIMES DAILY
COMMUNITY
Start: 2024-01-12

## 2024-05-09 RX ORDER — METOPROLOL TARTRATE 25 MG/1
25 TABLET, FILM COATED ORAL 2 TIMES DAILY
COMMUNITY

## 2024-05-09 NOTE — PREPROCEDURE INSTRUCTIONS
Current Medications   Medication Instructions    acetaminophen (Tylenol) 500 mg capsule Take morning of surgery with sip of water, no other fluids    allopurinol (Zyloprim) 100 mg tablet Continue until night before surgery    apixaban (Eliquis) 5 mg tablet Take morning of surgery with sip of water, no other fluids    aspirin 81 mg chewable tablet Take morning of surgery with sip of water, no other fluids    atorvastatin (Lipitor) 20 mg tablet Continue until night before surgery    calcium carbonate-vitamin D3 500 mg-5 mcg (200 unit) tablet Stop 1 day before surgery    cetirizine (ZyrTEC) 10 mg tablet Continue until night before surgery    cyclobenzaprine (Flexeril) 10 mg tablet Continue until night before surgery    docusate sodium (Colace) 100 mg capsule Continue until night before surgery    gabapentin (Neurontin) 100 mg capsule Take morning of surgery with sip of water, no other fluids    guaiFENesin (Mucinex) 600 mg 12 hr tablet Take morning of surgery with sip of water, no other fluids    levothyroxine (Synthroid, Levoxyl) 100 mcg tablet Take morning of surgery with sip of water, no other fluids    levothyroxine (Synthroid, Levoxyl) 150 mcg tablet Take morning of surgery with sip of water, no other fluids    metoprolol tartrate (Lopressor) 25 mg tablet Take morning of surgery with sip of water, no other fluids    traMADol (Ultram) 50 mg tablet Take morning of surgery with sip of water, no other fluids         The medication bridging plan has been discussed with the surgeon and the patient has been informed of the plan.        NPO Instructions: Nothing to eat after midnight. May have 13 ounces of clear liquids TWO HOURS PRIOR TO ARRIVAL, may take medications discussed with clear liquid in the am of surgery.  Additional Instructions: Enter through main entrance of main hospital building, located at [address] [facility]. Proceed to registration, located in the back right hand corner. You will need your ID and  insurance card for registration. Please ensure you have a responsible adult to drive you home.     Shower the morning of or night before your procedure. After you shower avoid lotions, powders, deodorants or anything applied to the skin. If you wear contacts or glasses, wear the glasses. If you do not have glasses, please bring a case for your contacts. You may wear hearing aids and dentures, bring a case for them or we will provide one. Make sure you wear something loose and comfortable. Keep in mind your surgery type and wear something that will accommodate incisions or bandages. Please remove all jewelry.     For further questions Ya HALL can be contacted at [phone] between 7AM-3PM.

## 2024-05-10 ENCOUNTER — ANESTHESIA (OUTPATIENT)
Dept: OPERATING ROOM | Facility: HOSPITAL | Age: 74
End: 2024-05-10
Payer: MEDICARE

## 2024-05-10 ENCOUNTER — HOSPITAL ENCOUNTER (OUTPATIENT)
Facility: HOSPITAL | Age: 74
Setting detail: OUTPATIENT SURGERY
Discharge: HOME | End: 2024-05-10
Attending: PODIATRIST | Admitting: PODIATRIST
Payer: MEDICARE

## 2024-05-10 ENCOUNTER — ANESTHESIA EVENT (OUTPATIENT)
Dept: OPERATING ROOM | Facility: HOSPITAL | Age: 74
End: 2024-05-10
Payer: MEDICARE

## 2024-05-10 VITALS
BODY MASS INDEX: 48.15 KG/M2 | HEART RATE: 77 BPM | OXYGEN SATURATION: 100 % | DIASTOLIC BLOOD PRESSURE: 61 MMHG | RESPIRATION RATE: 16 BRPM | TEMPERATURE: 97.9 F | WEIGHT: 289 LBS | SYSTOLIC BLOOD PRESSURE: 125 MMHG | HEIGHT: 65 IN

## 2024-05-10 DIAGNOSIS — Z98.890 H/O FOOT SURGERY: Primary | ICD-10-CM

## 2024-05-10 DIAGNOSIS — S80.12XA HEMATOMA OF LOWER LIMB, LEFT, INITIAL ENCOUNTER: ICD-10-CM

## 2024-05-10 PROCEDURE — A10140 PR DRAINAGE OF HEMATOMA/FLUID: Performed by: ANESTHESIOLOGIST ASSISTANT

## 2024-05-10 PROCEDURE — 87205 SMEAR GRAM STAIN: CPT | Mod: 91,PARLAB | Performed by: PODIATRIST

## 2024-05-10 PROCEDURE — 2500000005 HC RX 250 GENERAL PHARMACY W/O HCPCS: Performed by: ANESTHESIOLOGIST ASSISTANT

## 2024-05-10 PROCEDURE — 7100000010 HC PHASE TWO TIME - EACH INCREMENTAL 1 MINUTE: Performed by: PODIATRIST

## 2024-05-10 PROCEDURE — 3700000002 HC GENERAL ANESTHESIA TIME - EACH INCREMENTAL 1 MINUTE: Performed by: PODIATRIST

## 2024-05-10 PROCEDURE — 7100000009 HC PHASE TWO TIME - INITIAL BASE CHARGE: Performed by: PODIATRIST

## 2024-05-10 PROCEDURE — 99100 ANES PT EXTEME AGE<1 YR&>70: CPT | Performed by: ANESTHESIOLOGY

## 2024-05-10 PROCEDURE — 3700000001 HC GENERAL ANESTHESIA TIME - INITIAL BASE CHARGE: Performed by: PODIATRIST

## 2024-05-10 PROCEDURE — 7100000001 HC RECOVERY ROOM TIME - INITIAL BASE CHARGE: Performed by: PODIATRIST

## 2024-05-10 PROCEDURE — 2500000005 HC RX 250 GENERAL PHARMACY W/O HCPCS: Performed by: PODIATRIST

## 2024-05-10 PROCEDURE — 7100000002 HC RECOVERY ROOM TIME - EACH INCREMENTAL 1 MINUTE: Performed by: PODIATRIST

## 2024-05-10 PROCEDURE — A4217 STERILE WATER/SALINE, 500 ML: HCPCS | Performed by: PODIATRIST

## 2024-05-10 PROCEDURE — A10140 PR DRAINAGE OF HEMATOMA/FLUID: Performed by: ANESTHESIOLOGY

## 2024-05-10 PROCEDURE — 2720000007 HC OR 272 NO HCPCS: Performed by: PODIATRIST

## 2024-05-10 PROCEDURE — 3600000002 HC OR TIME - INITIAL BASE CHARGE - PROCEDURE LEVEL TWO: Performed by: PODIATRIST

## 2024-05-10 PROCEDURE — 2500000004 HC RX 250 GENERAL PHARMACY W/ HCPCS (ALT 636 FOR OP/ED): Performed by: ANESTHESIOLOGIST ASSISTANT

## 2024-05-10 PROCEDURE — 2500000004 HC RX 250 GENERAL PHARMACY W/ HCPCS (ALT 636 FOR OP/ED): Performed by: PODIATRIST

## 2024-05-10 PROCEDURE — 3600000007 HC OR TIME - EACH INCREMENTAL 1 MINUTE - PROCEDURE LEVEL TWO: Performed by: PODIATRIST

## 2024-05-10 RX ORDER — SODIUM CHLORIDE 0.9 G/100ML
IRRIGANT IRRIGATION AS NEEDED
Status: DISCONTINUED | OUTPATIENT
Start: 2024-05-10 | End: 2024-05-10 | Stop reason: HOSPADM

## 2024-05-10 RX ORDER — ONDANSETRON HYDROCHLORIDE 2 MG/ML
4 INJECTION, SOLUTION INTRAVENOUS ONCE AS NEEDED
Status: CANCELLED | OUTPATIENT
Start: 2024-05-10

## 2024-05-10 RX ORDER — BUPIVACAINE HYDROCHLORIDE 5 MG/ML
INJECTION, SOLUTION PERINEURAL AS NEEDED
Status: DISCONTINUED | OUTPATIENT
Start: 2024-05-10 | End: 2024-05-10 | Stop reason: HOSPADM

## 2024-05-10 RX ORDER — CEFAZOLIN 1 G/1
INJECTION, POWDER, FOR SOLUTION INTRAVENOUS AS NEEDED
Status: DISCONTINUED | OUTPATIENT
Start: 2024-05-10 | End: 2024-05-10

## 2024-05-10 RX ORDER — FENTANYL CITRATE 50 UG/ML
INJECTION, SOLUTION INTRAMUSCULAR; INTRAVENOUS AS NEEDED
Status: DISCONTINUED | OUTPATIENT
Start: 2024-05-10 | End: 2024-05-10

## 2024-05-10 RX ORDER — LIDOCAINE HCL/PF 100 MG/5ML
SYRINGE (ML) INTRAVENOUS AS NEEDED
Status: DISCONTINUED | OUTPATIENT
Start: 2024-05-10 | End: 2024-05-10

## 2024-05-10 RX ORDER — SUCCINYLCHOLINE CHLORIDE 20 MG/ML INJECTION SOLUTION
SOLUTION AS NEEDED
Status: DISCONTINUED | OUTPATIENT
Start: 2024-05-10 | End: 2024-05-10

## 2024-05-10 RX ORDER — LIDOCAINE HYDROCHLORIDE 10 MG/ML
0.1 INJECTION INFILTRATION; PERINEURAL ONCE
Status: CANCELLED | OUTPATIENT
Start: 2024-05-10 | End: 2024-05-10

## 2024-05-10 RX ORDER — IBUPROFEN 200 MG
400 TABLET ORAL EVERY 6 HOURS PRN
Status: CANCELLED | OUTPATIENT
Start: 2024-05-10

## 2024-05-10 RX ORDER — ALBUTEROL SULFATE 0.83 MG/ML
2.5 SOLUTION RESPIRATORY (INHALATION) ONCE AS NEEDED
Status: CANCELLED | OUTPATIENT
Start: 2024-05-10

## 2024-05-10 RX ORDER — SODIUM CHLORIDE, SODIUM LACTATE, POTASSIUM CHLORIDE, CALCIUM CHLORIDE 600; 310; 30; 20 MG/100ML; MG/100ML; MG/100ML; MG/100ML
INJECTION, SOLUTION INTRAVENOUS CONTINUOUS PRN
Status: DISCONTINUED | OUTPATIENT
Start: 2024-05-10 | End: 2024-05-10

## 2024-05-10 RX ORDER — TRAMADOL HYDROCHLORIDE 50 MG/1
50 TABLET ORAL EVERY 8 HOURS PRN
Qty: 28 TABLET | Refills: 0 | Status: CANCELLED | OUTPATIENT
Start: 2024-05-10 | End: 2024-05-19

## 2024-05-10 RX ORDER — PROPOFOL 10 MG/ML
INJECTION, EMULSION INTRAVENOUS AS NEEDED
Status: DISCONTINUED | OUTPATIENT
Start: 2024-05-10 | End: 2024-05-10

## 2024-05-10 RX ORDER — HYDRALAZINE HYDROCHLORIDE 20 MG/ML
5 INJECTION INTRAMUSCULAR; INTRAVENOUS EVERY 30 MIN PRN
Status: CANCELLED | OUTPATIENT
Start: 2024-05-10

## 2024-05-10 RX ORDER — LABETALOL HYDROCHLORIDE 5 MG/ML
5 INJECTION, SOLUTION INTRAVENOUS ONCE AS NEEDED
Status: CANCELLED | OUTPATIENT
Start: 2024-05-10

## 2024-05-10 RX ORDER — MIDAZOLAM HYDROCHLORIDE 1 MG/ML
1 INJECTION, SOLUTION INTRAMUSCULAR; INTRAVENOUS ONCE AS NEEDED
Status: CANCELLED | OUTPATIENT
Start: 2024-05-10

## 2024-05-10 RX ORDER — SODIUM CHLORIDE, SODIUM LACTATE, POTASSIUM CHLORIDE, CALCIUM CHLORIDE 600; 310; 30; 20 MG/100ML; MG/100ML; MG/100ML; MG/100ML
100 INJECTION, SOLUTION INTRAVENOUS CONTINUOUS
Status: CANCELLED | OUTPATIENT
Start: 2024-05-10

## 2024-05-10 RX ORDER — ONDANSETRON HYDROCHLORIDE 2 MG/ML
INJECTION, SOLUTION INTRAVENOUS AS NEEDED
Status: DISCONTINUED | OUTPATIENT
Start: 2024-05-10 | End: 2024-05-10

## 2024-05-10 RX ORDER — MEPERIDINE HYDROCHLORIDE 25 MG/ML
12.5 INJECTION INTRAMUSCULAR; INTRAVENOUS; SUBCUTANEOUS EVERY 10 MIN PRN
Status: CANCELLED | OUTPATIENT
Start: 2024-05-10

## 2024-05-10 RX ADMIN — ONDANSETRON 4 MG: 2 INJECTION INTRAMUSCULAR; INTRAVENOUS at 15:04

## 2024-05-10 RX ADMIN — LIDOCAINE HYDROCHLORIDE 100 MG: 20 INJECTION INTRAVENOUS at 14:47

## 2024-05-10 RX ADMIN — SODIUM CHLORIDE, SODIUM LACTATE, POTASSIUM CHLORIDE, AND CALCIUM CHLORIDE: 600; 310; 30; 20 INJECTION, SOLUTION INTRAVENOUS at 14:38

## 2024-05-10 RX ADMIN — CEFAZOLIN 3 G: 1 INJECTION, POWDER, FOR SOLUTION INTRAMUSCULAR; INTRAVENOUS at 14:53

## 2024-05-10 RX ADMIN — FENTANYL CITRATE 50 MCG: 50 INJECTION, SOLUTION INTRAMUSCULAR; INTRAVENOUS at 14:47

## 2024-05-10 RX ADMIN — Medication 120 MG: at 14:47

## 2024-05-10 RX ADMIN — PROPOFOL 140 MG: 10 INJECTION, EMULSION INTRAVENOUS at 14:47

## 2024-05-10 SDOH — HEALTH STABILITY: MENTAL HEALTH: CURRENT SMOKER: 0

## 2024-05-10 ASSESSMENT — COLUMBIA-SUICIDE SEVERITY RATING SCALE - C-SSRS
2. HAVE YOU ACTUALLY HAD ANY THOUGHTS OF KILLING YOURSELF?: NO
6. HAVE YOU EVER DONE ANYTHING, STARTED TO DO ANYTHING, OR PREPARED TO DO ANYTHING TO END YOUR LIFE?: NO
1. IN THE PAST MONTH, HAVE YOU WISHED YOU WERE DEAD OR WISHED YOU COULD GO TO SLEEP AND NOT WAKE UP?: NO

## 2024-05-10 ASSESSMENT — PAIN - FUNCTIONAL ASSESSMENT
PAIN_FUNCTIONAL_ASSESSMENT: 0-10

## 2024-05-10 ASSESSMENT — PAIN SCALES - GENERAL
PAINLEVEL_OUTOF10: 0 - NO PAIN
PAINLEVEL_OUTOF10: 8
PAIN_LEVEL: 0
PAINLEVEL_OUTOF10: 0 - NO PAIN

## 2024-05-10 ASSESSMENT — ENCOUNTER SYMPTOMS
CARDIOVASCULAR NEGATIVE: 1
CONSTITUTIONAL NEGATIVE: 1
RESPIRATORY NEGATIVE: 1
GASTROINTESTINAL NEGATIVE: 1

## 2024-05-10 NOTE — ANESTHESIA POSTPROCEDURE EVALUATION
Patient: Bertha Dejesus    Procedure Summary       Date: 05/10/24 Room / Location: [facility] OR 02 / Virtual [facility] OR    Anesthesia Start: 1438 Anesthesia Stop: 1521    Procedure: LEFT LOWER EXTREMITY HEMATOMA EXCISION (Left) Diagnosis:       (T79.A22D Left foot compartment syndrome)      (M80.12XA Left foot hematoma)      (M79.672 Left foot pain)      (M79.605 Left leg pain)    Surgeons: Ezekiel Davey DPM Responsible Provider: Uriel Ba MD    Anesthesia Type: general ASA Status: 4            Anesthesia Type: general    Vitals Value Taken Time   /73 05/10/24 1518   Temp 36 °C (96.8 °F) 05/10/24 1518   Pulse 85 05/10/24 1518   Resp 16 05/10/24 1518   SpO2 100 % 05/10/24 1518       Anesthesia Post Evaluation    Patient location during evaluation: PACU  Patient participation: complete - patient participated  Level of consciousness: awake  Pain score: 0  Pain management: adequate  Airway patency: patent  Cardiovascular status: acceptable  Respiratory status: acceptable  Hydration status: acceptable  Postoperative Nausea and Vomiting: none      There were no known notable events for this encounter.

## 2024-05-10 NOTE — ANESTHESIA PREPROCEDURE EVALUATION
Patient: Bertha Dejesus    Procedure Information       Date/Time: 05/10/24 1500    Procedure: LEFT LOWER EXTREMITY HEMATOMA EXCISION (Left)    Location: [facility] OR 02 / Virtual [facility] OR    Surgeons: Ezekiel Davey DPM            Relevant Problems   Anesthesia (within normal limits)       Clinical information reviewed:    Allergies  Meds   Med Hx  Surg Hx   Fam Hx          NPO Detail:  NPO/Void Status  Date of Last Liquid: 05/10/24  Time of Last Liquid: 0000  Date of Last Solid: 05/10/24  Time of Last Solid: 0000         Physical Exam    Airway  Mallampati: II  TM distance: >3 FB  Neck ROM: full     Cardiovascular   Rhythm: irregular  Rate: normal     Dental    Pulmonary    Abdominal        Anesthesia Plan    History of general anesthesia?: yes  History of complications of general anesthesia?: no    ASA 4     general     The patient is not a current smoker.  Patient was not previously instructed to abstain from smoking on day of procedure.  Patient did not smoke on day of procedure.    intravenous induction   Anesthetic plan and risks discussed with patient.  Use of blood products discussed with patient who.    Plan discussed with [facility]AA.

## 2024-05-10 NOTE — DISCHARGE INSTRUCTIONS
PODIATRIC SURGERY POST-OP INSTRUCTIONS     YOU HAD ANESTHESIA:  You must have a responsible adult drive you home and stay with you for the first 24 hours.    Do not drive a car or drink any alcohol for 24 hours after surgery.  Do not do any strenuous work or activity for the next 24 hours.  You may have mild nausea, a sore throat or raspy voice for a few days.    You received a local numbing block to the foot after your surgery. You should expect the surgical extremity to remain numb for the next 6-12 hours.     POST-OP INSTRUCTIONS:  Keep dressing clean, dry and intact until your first post-operative appointment.  Do not remove surgical dressing. You may need to loosen if necessary.   Do not shower unless using a cast protector to protect dressing.  If dressing gets wet, please call office immediately as this can lead to increased risk of infection or wound dehiscence.   Elevate surgical extremity as much as possible to help with pain and swelling.  Place ice pack behind knee of surgical extremity (20 minutes on/20 minutes off while awake). After 24 hours use ice behind the knee as needed for comfort.  Weight Bearing Status: Please remain non-weight bearing to the surgical extremity with soft cast in place utilizing crutches, knee scooter or walker.  Please resume all home medications.   Post-Operative Medications: You were prescribed Percocet for pain; please take as directed on the label. You may take Tylenol for pain; please take as directed on bottle. You were prescribed Zofran (Ondansetron) 8 mg for post-operative nausea; please take as needed and directed on the label.  For your pain medication, take as needed; wean off as soon as tolerated. In the event you have constipation, you may take over the counter stool softeners and laxatives as needed.  Please call office ([phone]) to schedule post-operative appointment if not already scheduled for 1 weeks after surgery.   Should any problems, questions, or  concerns arise, please call the clinic office.     WHEN TO CALL YOUR DOCTOR:  Fever (>100.4°F or 38.0°C) or chills.  Incision problems such as redness, warmth, swelling, or foul smelling drainage.  Severe nausea or persistent vomiting.  Pain and swelling in your legs, especially if it is only on one side and not the other.  Pain with urination, cloudy urine, or foul smelling urine.  Or if you have any other problems or questions.  CALL 911 or go to the ED if you have any shortness of breath, difficulty breathing, or chest pain.

## 2024-05-10 NOTE — ANESTHESIA PROCEDURE NOTES
Airway  Date/Time: 5/10/2024 2:48 PM  Urgency: elective    Airway not difficult    Staffing  Performed: RONNA   Authorized by: Uriel Ba MD    Performed by: RONNA Morgan  Patient location during procedure: OR    Indications and Patient Condition  Indications for airway management: anesthesia and airway protection  Spontaneous Ventilation: absent  Sedation level: deep  Preoxygenated: yes  Patient position: sniffing  MILS maintained throughout  Mask difficulty assessment: 1 - vent by mask  Planned trial extubation    Final Airway Details  Final airway type: endotracheal airway      Successful airway: ETT  Cuffed: yes   Successful intubation technique: video laryngoscopy  Facilitating devices/methods: intubating stylet  Endotracheal tube insertion site: oral  Blade: Des  Blade size: #3  ETT size (mm): 7.0  Cormack-Lehane Classification: grade I - full view of glottis  Placement verified by: capnometry   Measured from: lips  ETT to lips (cm): 20  Number of attempts at approach: 1  Number of other approaches attempted: 0           no difficulty in swallowing/no hoarseness

## 2024-05-10 NOTE — H&P
History Of Present Illness  Bertha Dejesus is a 74 y.o. female presenting with left leg hematoma.     Past Medical History  No past medical history on file.    Surgical History  No past surgical history on file.     Social History  She has no history on file for tobacco use, alcohol use, and drug use.    Family History  No family history on file.     Allergies  Cephalexin, Haemophilus influenzae type b, Iodinated contrast media, Lamotrigine, Pentazocine lactate, Pneumococcal vaccine, Talwin nx [pentazocine-naloxone], Influenza virus vaccines, Prednisone, Propoxycaine, Propoxyphene, and Darvocet a500 [propoxyphene n-acetaminophen]    Review of Systems   Constitutional: Negative.    HENT: Negative.     Respiratory: Negative.     Cardiovascular: Negative.    Gastrointestinal: Negative.           Physical Exam  Constitutional:       Appearance: Normal appearance. She is obese.   HENT:      Head: Normocephalic and atraumatic.   Eyes:      Pupils: Pupils are equal, round, and reactive to light.   Cardiovascular:      Rate and Rhythm: Normal rate and regular rhythm.   Pulmonary:      Effort: Pulmonary effort is normal.   Abdominal:      General: Bowel sounds are normal.      Palpations: Abdomen is soft.   Skin:     General: Skin is dry.      Comments: Ecchymosis to the left calf  Palpable hematoma    Neurological:      Mental Status: She is alert.          Last Recorded Vitals  There were no vitals taken for this visit.    Relevant Results             Assessment/Plan   Active Problems:  There are no active Hospital Problems.      Hematoma evacuation          Bruce Luis MS, DPM  Podiatry Surgery, PGY-3       Note is not final- pending attending signature

## 2024-05-11 RX ORDER — TRAMADOL HYDROCHLORIDE 50 MG/1
50 TABLET ORAL EVERY 6 HOURS PRN
Qty: 28 TABLET | Refills: 0 | Status: SHIPPED | OUTPATIENT
Start: 2024-05-11 | End: 2024-05-18

## 2024-05-12 NOTE — OP NOTE
LEFT LOWER EXTREMITY HEMATOMA EXCISION (L) Operative Note     Date: 5/10/2024  OR Location: [facility]    Name: Bertha Dejesus, : 1950, Age: 74 y.o., MRN: [id number], Sex: female    Diagnosis    Compartment syndrome T79.A22s  ?Traumatic compartment syndrome T79.A22D  ?Traumatic hematoma S80.12xa  ?Left foot pain M79.672  ?Left leg pain M79.605     Compartment syndrome T79.A22s  ?Traumatic compartment syndrome T79.A22D  ?Traumatic hematoma S80.12xa  ?Left foot pain M79.672  ?Left leg pain M79.605       Procedures  LEFT LOWER EXTREMITY HEMATOMA EXCISION  13317 - AK I&D HEMATOMA SEROMA/FLUID COLLECTION      Surgeons      * Ezekiel Davey - Primary    Resident/Fellow/Other Assistant:  Surgeons and Role:     * Bob Luis DPM - Resident - Assisting     * Mellissa Barba DPM - Resident - Assisting    Procedure Summary  Anesthesia: Monitor Anesthesia Care  ASA: IV  Anesthesia Staff: Anesthesiologist: Uriel Ba MD  C-AA: RONNA Morgan  Estimated Blood Loss: 10mL  Intra-op Medications:   Administrations occurring from 1500 to 1630 on 05/10/24:   Medication Name Total Dose   BUPivacaine HCl (Marcaine) 0.5 % (5 mg/mL) injection 10 mL              Anesthesia Record               Intraprocedure I/O Totals          Intake    LR infusion 400.00 mL    Total Intake 400 mL          Specimen:   ID Type Source Tests Collected by Time   A : LEFT LEG HEMATOMA WOUND CULTURE Swab ABSCESS FUNGAL CULTURE/SMEAR, TISSUE/WOUND CULTURE/SMEAR Ezekiel Davey DPM 5/10/2024 1449        Staff:   Circulator: Kenia Garcia RN; Isabel Mcintosh RN  Scrub Person: Paula Ng RN     Drains and/or Catheters: * None in log *    Tourniquet Times: none    Implants: none    Findings: per procedure details     Indications: Bertha Dejesus is an 74 y.o. female who is having surgery for T79.A22D Left foot compartment syndrome  M80.12XA Left foot hematoma  M79.672 Left foot pain  M79.605 Left leg pain.       Procedure  Details:     This is a 74 year old female who was admitted to [facility] for left leg hematoma evacuation.   Patient has been suffering with pain and continued enlargement of the hematoma. Patient failed conservative care and desired to have surgical correction at this time.    The nature of the deformity, problems, anticipated procedures, post-operative recovery/convalescence, risk/complications, including but not limited to: numbness, CRPS, problems healing soft tissue, post-op wound infection/dehiscence, need IV or oral antibiotic, DVT, PE, persistent pain, further surgeries, excess bills, disability have been explained to the patient in detail. All questions were answered to patient satisfaction. No promises or guarantees were given or implied, patient understands.    The left lower extremity was scrubbed, prepped, and draped in the usual aseptic manner.     Procedure:    Attention directed to the left lower extremity. Left leg hematoma mas marked circumferentially, measuring about 10cm in diameter.   A 3 cm linear incision was made at the medial lower 1/3 of the lower extremity which immediately led to a relief of what appears like blood products with darken gelatinous consistency.   The site was irrigated gently using hand flush multiple times. It appears that hematoma was extending closely to the knee joint, medial and lateral with couple centimeter extension distally.   Ensure complete removal of hematoma material. Base of hematoma was swabbed and sent for micro.   Incision was partially closed using 4-0 prolene to approximate skin edges and suture edges secured with steri strips without definitive closure for further drainage, if any.     Throughout the entire procedure, special attention was directed to critical anatomical structures as well as neurovascular bundled and were carefully protected, thru out the procedure.     The site was cleaned using wet then dry lap.       The incision covered with  bacitracin, adaptic, DSD. Applied modified Og compression dressing.       Patient was also given post-op injection of 10 cc of 0.5% marcaine in a V style at the incision site.       Patient was transferred to PACU with vital signs and vascular status intact to all digits, LLE, for further monitoring prior to discharge. The patient tolerated the procedure and anesthesia well in apparent satisfactory condition.The patient will be weight bearing to the left lower extremity and will follow up next week- refer to discharge instruction details.      Complications:  None; patient tolerated the procedure well.    Disposition: PACU - hemodynamically stable.  Condition: stable     Additional Details: none    Attending Attestation:     Ezekiel Davey  Phone Number: [phone]

## 2024-05-13 LAB
BACTERIA SPEC CULT: NORMAL
GRAM STN SPEC: NORMAL
GRAM STN SPEC: NORMAL

## 2024-05-13 ASSESSMENT — PAIN SCALES - GENERAL: PAINLEVEL_OUTOF10: 0 - NO PAIN

## 2024-06-11 ENCOUNTER — APPOINTMENT (OUTPATIENT)
Dept: GENERAL RADIOLOGY | Age: 74
End: 2024-06-11
Payer: MEDICARE

## 2024-06-11 ENCOUNTER — APPOINTMENT (OUTPATIENT)
Dept: CT IMAGING | Age: 74
End: 2024-06-11
Payer: MEDICARE

## 2024-06-11 ENCOUNTER — HOSPITAL ENCOUNTER (EMERGENCY)
Age: 74
Discharge: HOME | End: 2024-06-11
Payer: MEDICARE

## 2024-06-11 VITALS
TEMPERATURE: 97.8 F | RESPIRATION RATE: 16 BRPM | WEIGHT: 289 LBS | SYSTOLIC BLOOD PRESSURE: 126 MMHG | HEIGHT: 65 IN | DIASTOLIC BLOOD PRESSURE: 80 MMHG | BODY MASS INDEX: 48.15 KG/M2 | OXYGEN SATURATION: 98 % | HEART RATE: 77 BPM
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
## 2024-06-11 DIAGNOSIS — S09.90XA INJURY OF HEAD, INITIAL ENCOUNTER: Primary | ICD-10-CM

## 2024-06-11 DIAGNOSIS — T14.8XXA ABRASION: ICD-10-CM

## 2024-06-11 DIAGNOSIS — S60.222A CONTUSION OF LEFT HAND, INITIAL ENCOUNTER: ICD-10-CM

## 2024-06-11 DIAGNOSIS — S80.02XA CONTUSION OF LEFT KNEE, INITIAL ENCOUNTER: ICD-10-CM

## 2024-06-11 PROCEDURE — 72125 CT NECK SPINE W/O DYE: CPT

## 2024-06-11 PROCEDURE — 70486 CT MAXILLOFACIAL W/O DYE: CPT

## 2024-06-11 PROCEDURE — 73562 X-RAY EXAM OF KNEE 3: CPT

## 2024-06-11 PROCEDURE — 6370000000 HC RX 637 (ALT 250 FOR IP): Performed by: NURSE PRACTITIONER

## 2024-06-11 PROCEDURE — 99284 EMERGENCY DEPT VISIT MOD MDM: CPT

## 2024-06-11 PROCEDURE — 70450 CT HEAD/BRAIN W/O DYE: CPT

## 2024-06-11 PROCEDURE — 73130 X-RAY EXAM OF HAND: CPT

## 2024-06-11 RX ORDER — TRAMADOL HYDROCHLORIDE 50 MG/1
50 TABLET ORAL ONCE
Status: COMPLETED | OUTPATIENT
Start: 2024-06-11 | End: 2024-06-11

## 2024-06-11 RX ORDER — BACITRACIN ZINC 500 [USP'U]/G
OINTMENT TOPICAL ONCE
Status: COMPLETED | OUTPATIENT
Start: 2024-06-11 | End: 2024-06-11

## 2024-06-11 RX ADMIN — BACITRACIN ZINC: 500 OINTMENT TOPICAL at 12:44

## 2024-06-11 RX ADMIN — TRAMADOL HYDROCHLORIDE 50 MG: 50 TABLET ORAL at 12:43

## 2024-06-11 ASSESSMENT — PAIN DESCRIPTION - DESCRIPTORS: DESCRIPTORS: ACHING

## 2024-06-11 ASSESSMENT — LIFESTYLE VARIABLES
HOW OFTEN DO YOU HAVE A DRINK CONTAINING ALCOHOL: NEVER
HOW MANY STANDARD DRINKS CONTAINING ALCOHOL DO YOU HAVE ON A TYPICAL DAY: PATIENT DOES NOT DRINK

## 2024-06-11 ASSESSMENT — PAIN DESCRIPTION - LOCATION: LOCATION: HEAD

## 2024-06-11 ASSESSMENT — PAIN SCALES - GENERAL: PAINLEVEL_OUTOF10: 8

## 2024-06-11 NOTE — ED NOTES
Ace wrap applied to pt right knee. Pt wound on left hand cleaned and dressing applied. Pt wound on head cleaned

## 2024-06-12 NOTE — ED PROVIDER NOTES
[facility] EMERGENCY DEPARTMENT  ED  Encounter Note  Admit Date/RoomTime: 6/11/2024 12:07 PM  ED Room: CHAIR04/C4  Independent CHINO Visit.  HPI:  6/12/24,   Time: 7:48 AM EDT         Olimpia Fish is a 74 y.o. female presenting to the ED for fall from wheelchair with facial contusions, head injury and abrasions to the knee, beginning prior to arrival ago.  The complaint has been constant, mild in severity, and worsened by nothing.  Presents stating that she was in her motorized wheelchair and became on even ground and subsequently tipped.  She fell out of the wheelchair and struck her face and has an abrasion to her forehead and the bridge of the nose.  She is UTD tetanus status.  She denies any loss of conscious.  She is on no anticoagulants.    ROS:   Pertinent positives and negatives are stated within HPI, all other systems reviewed and are negative.  --------------------------------------------- PAST HISTORY ---------------------------------------------  Past Medical History:  has a past medical history of Arthritis, Cerebral artery occlusion with cerebral infarction (HCC), Cigarette nicotine dependence without complication, COPD (chronic obstructive pulmonary disease) (HCC), Depression, Diabetes mellitus (HCC), GERD (gastroesophageal reflux disease), Hyperlipidemia, Hypertension, Hypothyroidism, Obese, SHALA (obstructive sleep apnea), and Palate mass.    Past Surgical History:  has a past surgical history that includes Hysterectomy; Foot surgery (Right, 2009); Appendectomy; Tonsillectomy; Colonoscopy; Endoscopy, colon, diagnostic; Carpal tunnel release (Bilateral); other surgical history (Left, 11/5/2014); Foot surgery (2014); Hammer toe surgery (Left, 03/06/2017); and joint replacement (bilat knee).    Social History:  reports that she has been smoking cigarettes and cigars. She has never used smokeless tobacco. She reports that she does not drink alcohol and does not

## 2024-08-22 ENCOUNTER — HOSPITAL ENCOUNTER (OUTPATIENT)
Dept: GENERAL RADIOLOGY | Age: 74
Discharge: HOME | End: 2024-08-24
Payer: MEDICARE

## 2024-08-22 VITALS — WEIGHT: 289 LBS | BODY MASS INDEX: 48.15 KG/M2 | HEIGHT: 65 IN

## 2024-08-22 DIAGNOSIS — Z12.31 OTHER SCREENING MAMMOGRAM: ICD-10-CM

## 2024-08-22 PROCEDURE — 77063 BREAST TOMOSYNTHESIS BI: CPT

## 2024-11-24 ENCOUNTER — HOSPITAL ENCOUNTER (INPATIENT)
Age: 74
LOS: 5 days | Discharge: HOME | DRG: 309 | End: 2024-11-30
Attending: EMERGENCY MEDICINE | Admitting: STUDENT IN AN ORGANIZED HEALTH CARE EDUCATION/TRAINING PROGRAM
Payer: MEDICARE

## 2024-11-24 ENCOUNTER — APPOINTMENT (OUTPATIENT)
Dept: CT IMAGING | Age: 74
DRG: 309 | End: 2024-11-24
Payer: MEDICARE

## 2024-11-24 DIAGNOSIS — M25.512 ACUTE PAIN OF LEFT SHOULDER: ICD-10-CM

## 2024-11-24 DIAGNOSIS — R60.0 LOCALIZED EDEMA: ICD-10-CM

## 2024-11-24 DIAGNOSIS — I48.91 ATRIAL FIBRILLATION, UNSPECIFIED TYPE (HCC): Primary | ICD-10-CM

## 2024-11-24 DIAGNOSIS — M54.2 NECK PAIN: ICD-10-CM

## 2024-11-24 PROBLEM — K21.9 GASTROESOPHAGEAL REFLUX DISEASE: Status: ACTIVE | Noted: 2017-06-06

## 2024-11-24 PROBLEM — F17.210 TOBACCO DEPENDENCE DUE TO CIGARETTES: Status: ACTIVE | Noted: 2018-05-29

## 2024-11-24 PROBLEM — I25.10 ATHEROSCLEROTIC HEART DISEASE OF NATIVE CORONARY ARTERY WITHOUT ANGINA PECTORIS: Status: ACTIVE | Noted: 2020-09-22

## 2024-11-24 PROBLEM — Z98.84 HISTORY OF GASTRIC BYPASS: Status: ACTIVE | Noted: 2024-11-24

## 2024-11-24 PROBLEM — E03.9 ACQUIRED HYPOTHYROIDISM: Status: ACTIVE | Noted: 2018-10-31

## 2024-11-24 PROBLEM — E11.51 PERIPHERAL VASCULAR DISORDER DUE TO DIABETES MELLITUS (HCC): Status: ACTIVE | Noted: 2023-10-03

## 2024-11-24 PROBLEM — I95.1 ORTHOSTATIC HYPOTENSION: Status: ACTIVE | Noted: 2020-09-22

## 2024-11-24 PROBLEM — E65 ABDOMINAL PANNUS: Status: ACTIVE | Noted: 2017-06-06

## 2024-11-24 LAB
ALBUMIN SERPL-MCNC: 3.5 G/DL (ref 3.5–5.2)
ALP SERPL-CCNC: 80 U/L (ref 35–104)
ALT SERPL-CCNC: 6 U/L (ref 0–32)
ANION GAP SERPL CALCULATED.3IONS-SCNC: 12 MMOL/L (ref 7–16)
AST SERPL-CCNC: 11 U/L (ref 0–31)
BACTERIA URNS QL MICRO: ABNORMAL
BASOPHILS # BLD: 0.03 K/UL (ref 0–0.2)
BASOPHILS NFR BLD: 0 % (ref 0–2)
BILIRUB SERPL-MCNC: 0.5 MG/DL (ref 0–1.2)
BILIRUB UR QL STRIP: NEGATIVE
BUN SERPL-MCNC: 20 MG/DL (ref 6–23)
CALCIUM SERPL-MCNC: 9.4 MG/DL (ref 8.6–10.2)
CHLORIDE SERPL-SCNC: 98 MMOL/L (ref 98–107)
CLARITY UR: ABNORMAL
CO2 SERPL-SCNC: 22 MMOL/L (ref 22–29)
COLOR UR: YELLOW
CREAT SERPL-MCNC: 0.8 MG/DL (ref 0.5–1)
EOSINOPHIL # BLD: 0.01 K/UL (ref 0.05–0.5)
EOSINOPHILS RELATIVE PERCENT: 0 % (ref 0–6)
ERYTHROCYTE [DISTWIDTH] IN BLOOD BY AUTOMATED COUNT: 13.2 % (ref 11.5–15)
GFR, ESTIMATED: 82 ML/MIN/1.73M2
GLUCOSE SERPL-MCNC: 102 MG/DL (ref 74–99)
GLUCOSE UR STRIP-MCNC: NEGATIVE MG/DL
HCT VFR BLD AUTO: 38.8 % (ref 34–48)
HGB BLD-MCNC: 12.7 G/DL (ref 11.5–15.5)
HGB UR QL STRIP.AUTO: NEGATIVE
IMM GRANULOCYTES # BLD AUTO: 0.09 K/UL (ref 0–0.58)
IMM GRANULOCYTES NFR BLD: 1 % (ref 0–5)
KETONES UR STRIP-MCNC: 15 MG/DL
LEUKOCYTE ESTERASE UR QL STRIP: ABNORMAL
LYMPHOCYTES NFR BLD: 1.63 K/UL (ref 1.5–4)
LYMPHOCYTES RELATIVE PERCENT: 10 % (ref 20–42)
MCH RBC QN AUTO: 29.9 PG (ref 26–35)
MCHC RBC AUTO-ENTMCNC: 32.7 G/DL (ref 32–34.5)
MCV RBC AUTO: 91.3 FL (ref 80–99.9)
MONOCYTES NFR BLD: 0.8 K/UL (ref 0.1–0.95)
MONOCYTES NFR BLD: 5 % (ref 2–12)
NEUTROPHILS NFR BLD: 84 % (ref 43–80)
NEUTS SEG NFR BLD: 13.4 K/UL (ref 1.8–7.3)
NITRITE UR QL STRIP: NEGATIVE
PH UR STRIP: 6 [PH] (ref 5–9)
PLATELET # BLD AUTO: 258 K/UL (ref 130–450)
PMV BLD AUTO: 9.3 FL (ref 7–12)
POTASSIUM SERPL-SCNC: 4 MMOL/L (ref 3.5–5)
PROT SERPL-MCNC: 6.5 G/DL (ref 6.4–8.3)
PROT UR STRIP-MCNC: NEGATIVE MG/DL
RBC # BLD AUTO: 4.25 M/UL (ref 3.5–5.5)
RBC #/AREA URNS HPF: ABNORMAL /HPF
SODIUM SERPL-SCNC: 132 MMOL/L (ref 132–146)
SP GR UR STRIP: 1.02 (ref 1–1.03)
TROPONIN I SERPL HS-MCNC: 6 NG/L (ref 0–9)
UROBILINOGEN UR STRIP-ACNC: 0.2 EU/DL (ref 0–1)
WBC #/AREA URNS HPF: ABNORMAL /HPF
WBC OTHER # BLD: 16 K/UL (ref 4.5–11.5)

## 2024-11-24 PROCEDURE — 6370000000 HC RX 637 (ALT 250 FOR IP): Performed by: HOSPITALIST

## 2024-11-24 PROCEDURE — 99285 EMERGENCY DEPT VISIT HI MDM: CPT

## 2024-11-24 PROCEDURE — 87086 URINE CULTURE/COLONY COUNT: CPT

## 2024-11-24 PROCEDURE — 6360000002 HC RX W HCPCS: Performed by: EMERGENCY MEDICINE

## 2024-11-24 PROCEDURE — 85025 COMPLETE CBC W/AUTO DIFF WBC: CPT

## 2024-11-24 PROCEDURE — 80053 COMPREHEN METABOLIC PANEL: CPT

## 2024-11-24 PROCEDURE — G0378 HOSPITAL OBSERVATION PER HR: HCPCS

## 2024-11-24 PROCEDURE — 6370000000 HC RX 637 (ALT 250 FOR IP): Performed by: EMERGENCY MEDICINE

## 2024-11-24 PROCEDURE — 2500000003 HC RX 250 WO HCPCS: Performed by: HOSPITALIST

## 2024-11-24 PROCEDURE — 96374 THER/PROPH/DIAG INJ IV PUSH: CPT

## 2024-11-24 PROCEDURE — 93005 ELECTROCARDIOGRAM TRACING: CPT | Performed by: EMERGENCY MEDICINE

## 2024-11-24 PROCEDURE — 99223 1ST HOSP IP/OBS HIGH 75: CPT | Performed by: HOSPITALIST

## 2024-11-24 PROCEDURE — 84484 ASSAY OF TROPONIN QUANT: CPT

## 2024-11-24 PROCEDURE — 96375 TX/PRO/DX INJ NEW DRUG ADDON: CPT

## 2024-11-24 PROCEDURE — 87088 URINE BACTERIA CULTURE: CPT

## 2024-11-24 PROCEDURE — 2500000003 HC RX 250 WO HCPCS: Performed by: EMERGENCY MEDICINE

## 2024-11-24 PROCEDURE — 2580000003 HC RX 258: Performed by: HOSPITALIST

## 2024-11-24 PROCEDURE — 81001 URINALYSIS AUTO W/SCOPE: CPT

## 2024-11-24 PROCEDURE — 72125 CT NECK SPINE W/O DYE: CPT

## 2024-11-24 RX ORDER — SODIUM CHLORIDE 9 MG/ML
INJECTION, SOLUTION INTRAVENOUS PRN
Status: DISCONTINUED | OUTPATIENT
Start: 2024-11-24 | End: 2024-11-30 | Stop reason: HOSPADM

## 2024-11-24 RX ORDER — ONDANSETRON 2 MG/ML
4 INJECTION INTRAMUSCULAR; INTRAVENOUS EVERY 6 HOURS PRN
Status: DISCONTINUED | OUTPATIENT
Start: 2024-11-24 | End: 2024-11-30 | Stop reason: HOSPADM

## 2024-11-24 RX ORDER — SENNOSIDES A AND B 8.6 MG/1
1 TABLET, FILM COATED ORAL DAILY PRN
Status: DISCONTINUED | OUTPATIENT
Start: 2024-11-24 | End: 2024-11-30 | Stop reason: HOSPADM

## 2024-11-24 RX ORDER — MAGNESIUM HYDROXIDE/ALUMINUM HYDROXICE/SIMETHICONE 120; 1200; 1200 MG/30ML; MG/30ML; MG/30ML
30 SUSPENSION ORAL EVERY 6 HOURS PRN
Status: DISCONTINUED | OUTPATIENT
Start: 2024-11-24 | End: 2024-11-30 | Stop reason: HOSPADM

## 2024-11-24 RX ORDER — DILTIAZEM HYDROCHLORIDE 5 MG/ML
10 INJECTION INTRAVENOUS ONCE
Status: DISCONTINUED | OUTPATIENT
Start: 2024-11-24 | End: 2024-11-24

## 2024-11-24 RX ORDER — SULFAMETHOXAZOLE AND TRIMETHOPRIM 800; 160 MG/1; MG/1
1 TABLET ORAL ONCE
Status: COMPLETED | OUTPATIENT
Start: 2024-11-24 | End: 2024-11-24

## 2024-11-24 RX ORDER — MAGNESIUM SULFATE IN WATER 40 MG/ML
2000 INJECTION, SOLUTION INTRAVENOUS PRN
Status: DISCONTINUED | OUTPATIENT
Start: 2024-11-24 | End: 2024-11-30 | Stop reason: HOSPADM

## 2024-11-24 RX ORDER — ACETAMINOPHEN 650 MG/1
650 SUPPOSITORY RECTAL EVERY 6 HOURS PRN
Status: DISCONTINUED | OUTPATIENT
Start: 2024-11-24 | End: 2024-11-30 | Stop reason: HOSPADM

## 2024-11-24 RX ORDER — POLYETHYLENE GLYCOL 3350 17 G/17G
17 POWDER, FOR SOLUTION ORAL DAILY PRN
Status: DISCONTINUED | OUTPATIENT
Start: 2024-11-24 | End: 2024-11-30 | Stop reason: HOSPADM

## 2024-11-24 RX ORDER — DIAZEPAM 5 MG/1
5 TABLET ORAL ONCE
Status: COMPLETED | OUTPATIENT
Start: 2024-11-24 | End: 2024-11-24

## 2024-11-24 RX ORDER — ATORVASTATIN CALCIUM 20 MG/1
20 TABLET, FILM COATED ORAL DAILY
Status: DISCONTINUED | OUTPATIENT
Start: 2024-11-25 | End: 2024-11-30 | Stop reason: HOSPADM

## 2024-11-24 RX ORDER — POTASSIUM CHLORIDE 7.45 MG/ML
10 INJECTION INTRAVENOUS PRN
Status: DISCONTINUED | OUTPATIENT
Start: 2024-11-24 | End: 2024-11-24 | Stop reason: RX

## 2024-11-24 RX ORDER — FENTANYL CITRATE 50 UG/ML
50 INJECTION, SOLUTION INTRAMUSCULAR; INTRAVENOUS ONCE
Status: COMPLETED | OUTPATIENT
Start: 2024-11-24 | End: 2024-11-24

## 2024-11-24 RX ORDER — LEVOTHYROXINE SODIUM 75 UG/1
150 TABLET ORAL DAILY
Status: DISCONTINUED | OUTPATIENT
Start: 2024-11-25 | End: 2024-11-30 | Stop reason: HOSPADM

## 2024-11-24 RX ORDER — POTASSIUM CHLORIDE 1500 MG/1
40 TABLET, EXTENDED RELEASE ORAL PRN
Status: DISCONTINUED | OUTPATIENT
Start: 2024-11-24 | End: 2024-11-30 | Stop reason: HOSPADM

## 2024-11-24 RX ORDER — ALLOPURINOL 100 MG/1
100 TABLET ORAL DAILY
Status: DISCONTINUED | OUTPATIENT
Start: 2024-11-25 | End: 2024-11-30 | Stop reason: HOSPADM

## 2024-11-24 RX ORDER — ACETAMINOPHEN 325 MG/1
650 TABLET ORAL EVERY 6 HOURS PRN
Status: DISCONTINUED | OUTPATIENT
Start: 2024-11-24 | End: 2024-11-30 | Stop reason: HOSPADM

## 2024-11-24 RX ORDER — SODIUM CHLORIDE 0.9 % (FLUSH) 0.9 %
5-40 SYRINGE (ML) INJECTION PRN
Status: DISCONTINUED | OUTPATIENT
Start: 2024-11-24 | End: 2024-11-30 | Stop reason: HOSPADM

## 2024-11-24 RX ORDER — METOPROLOL TARTRATE 50 MG
50 TABLET ORAL ONCE
Status: COMPLETED | OUTPATIENT
Start: 2024-11-24 | End: 2024-11-24

## 2024-11-24 RX ORDER — SODIUM CHLORIDE 0.9 % (FLUSH) 0.9 %
5-40 SYRINGE (ML) INJECTION EVERY 12 HOURS SCHEDULED
Status: DISCONTINUED | OUTPATIENT
Start: 2024-11-24 | End: 2024-11-30 | Stop reason: HOSPADM

## 2024-11-24 RX ORDER — METOPROLOL TARTRATE 1 MG/ML
5 INJECTION, SOLUTION INTRAVENOUS ONCE
Status: COMPLETED | OUTPATIENT
Start: 2024-11-24 | End: 2024-11-24

## 2024-11-24 RX ORDER — METOPROLOL TARTRATE 50 MG
50 TABLET ORAL EVERY 8 HOURS
Status: DISCONTINUED | OUTPATIENT
Start: 2024-11-25 | End: 2024-11-25

## 2024-11-24 RX ORDER — ONDANSETRON 4 MG/1
4 TABLET, ORALLY DISINTEGRATING ORAL EVERY 8 HOURS PRN
Status: DISCONTINUED | OUTPATIENT
Start: 2024-11-24 | End: 2024-11-30 | Stop reason: HOSPADM

## 2024-11-24 RX ORDER — ASPIRIN 81 MG/1
81 TABLET ORAL DAILY
Status: DISCONTINUED | OUTPATIENT
Start: 2024-11-25 | End: 2024-11-30 | Stop reason: HOSPADM

## 2024-11-24 RX ORDER — CYCLOBENZAPRINE HCL 10 MG
10 TABLET ORAL 2 TIMES DAILY PRN
Status: DISCONTINUED | OUTPATIENT
Start: 2024-11-24 | End: 2024-11-25

## 2024-11-24 RX ADMIN — DIAZEPAM 5 MG: 5 TABLET ORAL at 18:34

## 2024-11-24 RX ADMIN — SODIUM CHLORIDE, PRESERVATIVE FREE 10 ML: 5 INJECTION INTRAVENOUS at 22:24

## 2024-11-24 RX ADMIN — MICONAZOLE NITRATE: 20 POWDER TOPICAL at 22:24

## 2024-11-24 RX ADMIN — DIAZEPAM 5 MG: 5 TABLET ORAL at 12:09

## 2024-11-24 RX ADMIN — FENTANYL CITRATE 50 MCG: 50 INJECTION INTRAMUSCULAR; INTRAVENOUS at 16:15

## 2024-11-24 RX ADMIN — SULFAMETHOXAZOLE AND TRIMETHOPRIM 1 TABLET: 800; 160 TABLET ORAL at 18:35

## 2024-11-24 RX ADMIN — APIXABAN 5 MG: 5 TABLET, FILM COATED ORAL at 22:23

## 2024-11-24 RX ADMIN — METOPROLOL TARTRATE 5 MG: 5 INJECTION INTRAVENOUS at 18:35

## 2024-11-24 RX ADMIN — METOPROLOL TARTRATE 50 MG: 50 TABLET, FILM COATED ORAL at 18:34

## 2024-11-24 ASSESSMENT — PAIN DESCRIPTION - ORIENTATION
ORIENTATION_2: RIGHT
ORIENTATION: RIGHT

## 2024-11-24 ASSESSMENT — PAIN SCALES - GENERAL
PAINLEVEL_OUTOF10: 10
PAINLEVEL_OUTOF10: 9
PAINLEVEL_OUTOF10: 10
PAINLEVEL_OUTOF10: 9

## 2024-11-24 ASSESSMENT — PAIN DESCRIPTION - LOCATION
LOCATION: NECK
LOCATION: SHOULDER;NECK
LOCATION_2: SHOULDER
LOCATION: NECK
LOCATION: NECK

## 2024-11-24 ASSESSMENT — PAIN DESCRIPTION - DESCRIPTORS: DESCRIPTORS: SHARP;ACHING

## 2024-11-24 ASSESSMENT — PAIN DESCRIPTION - PAIN TYPE: TYPE: ACUTE PAIN

## 2024-11-24 ASSESSMENT — PAIN DESCRIPTION - INTENSITY: RATING_2: 10

## 2024-11-24 ASSESSMENT — PAIN - FUNCTIONAL ASSESSMENT
PAIN_FUNCTIONAL_ASSESSMENT: 0-10
PAIN_FUNCTIONAL_ASSESSMENT: PREVENTS OR INTERFERES SOME ACTIVE ACTIVITIES AND ADLS

## 2024-11-24 NOTE — ED PROVIDER NOTES
HPI:  11/24/24, Time: 11:39 AM FISH Fish is a 74 y.o. female presenting to the ED for right neck and back pain.  Patient states that she woke up around 2:30 AM with pain and spasming in her right neck radiating into her shoulder.  She states that she then went and fell asleep in a chair and woke up with worsening pain.  She has been to physical therapy for this pain.  She is on Flexeril which she is not taking without relief.  No falls or trauma to her neck or shoulder.  No focal deficits.  Denies chest pain, shortness breath abdominal pain, nausea, and vomiting.  OARRS report reviewed.  Patient is also on tramadol.  Patient is on Eliquis for paroxysmal A-fib.    --------------------------------------------- PAST HISTORY ---------------------------------------------  Past Medical History:  has a past medical history of Arthritis, Cerebral artery occlusion with cerebral infarction (HCC), Cigarette nicotine dependence without complication, COPD (chronic obstructive pulmonary disease) (HCC), Depression, Diabetes mellitus (HCC), GERD (gastroesophageal reflux disease), Hyperlipidemia, Hypertension, Hypothyroidism, Obese, SHALA (obstructive sleep apnea), and Palate mass.    Past Surgical History:  has a past surgical history that includes Hysterectomy; Foot surgery (Right, 2009); Appendectomy; Tonsillectomy; Colonoscopy; Endoscopy, colon, diagnostic; Carpal tunnel release (Bilateral); other surgical history (Left, 11/5/2014); Foot surgery (2014); Hammer toe surgery (Left, 03/06/2017); and joint replacement (bilat knee).    Social History:  reports that she has been smoking cigarettes and cigars. She has never used smokeless tobacco. She reports that she does not drink alcohol and does not use drugs.    Family History: family history includes Breast Cancer in her sister; Cancer in her father; Cancer (age of onset: 6) in her son; Heart Disease in her mother; Lung Cancer in her brother, brother, and brother.      The patient’s home medications have been reviewed.    Allergies: Food, Iodinated contrast media, Iodine, Acetaminophen, Crab extract, Darvocet [propoxyphene n-acetaminophen], Influenza vaccines, Lamictal [lamotrigine], Mirtazapine, Other, Pentazocine lactate, Pneumococcal vaccines, Prednisone, Propoxycaine, Propoxyphene, Cephalexin, and Talwin [pentazocine]    -------------------------------------------------- RESULTS -------------------------------------------------  All laboratory and radiology results have been personally reviewed by myself   LABS:  Results for orders placed or performed during the hospital encounter of 11/24/24   CBC with Auto Differential   Result Value Ref Range    WBC 16.0 (H) 4.5 - 11.5 k/uL    RBC 4.25 3.50 - 5.50 m/uL    Hemoglobin 12.7 11.5 - 15.5 g/dL    Hematocrit 38.8 34.0 - 48.0 %    MCV 91.3 80.0 - 99.9 fL    MCH 29.9 26.0 - 35.0 pg    MCHC 32.7 32.0 - 34.5 g/dL    RDW 13.2 11.5 - 15.0 %    Platelets 258 130 - 450 k/uL    MPV 9.3 7.0 - 12.0 fL    Neutrophils % 84 (H) 43.0 - 80.0 %    Lymphocytes % 10 (L) 20.0 - 42.0 %    Monocytes % 5 2.0 - 12.0 %    Eosinophils % 0 0 - 6 %    Basophils % 0 0.0 - 2.0 %    Immature Granulocytes % 1 0.0 - 5.0 %    Neutrophils Absolute 13.40 (H) 1.80 - 7.30 k/uL    Lymphocytes Absolute 1.63 1.50 - 4.00 k/uL    Monocytes Absolute 0.80 0.10 - 0.95 k/uL    Eosinophils Absolute 0.01 (L) 0.05 - 0.50 k/uL    Basophils Absolute 0.03 0.00 - 0.20 k/uL    Immature Granulocytes Absolute 0.09 0.00 - 0.58 k/uL   CMP   Result Value Ref Range    Sodium 132 132 - 146 mmol/L    Potassium 4.0 3.5 - 5.0 mmol/L    Chloride 98 98 - 107 mmol/L    CO2 22 22 - 29 mmol/L    Anion Gap 12 7 - 16 mmol/L    Glucose 102 (H) 74 - 99 mg/dL    BUN 20 6 - 23 mg/dL    Creatinine 0.8 0.50 - 1.00 mg/dL    Est, Glom Filt Rate 82 >60 mL/min/1.73m2    Calcium 9.4 8.6 - 10.2 mg/dL    Total Protein 6.5 6.4 - 8.3 g/dL    Albumin 3.5 3.5 - 5.2 g/dL    Total Bilirubin 0.5 0.0 - 1.2 mg/dL

## 2024-11-25 PROBLEM — N30.00 ACUTE CYSTITIS WITHOUT HEMATURIA: Status: ACTIVE | Noted: 2024-11-25

## 2024-11-25 PROBLEM — I48.91 ATRIAL FIBRILLATION WITH RAPID VENTRICULAR RESPONSE (HCC): Status: ACTIVE | Noted: 2024-11-25

## 2024-11-25 LAB
ANION GAP SERPL CALCULATED.3IONS-SCNC: 11 MMOL/L (ref 7–16)
BASOPHILS # BLD: 0.02 K/UL (ref 0–0.2)
BASOPHILS NFR BLD: 0 % (ref 0–2)
BUN SERPL-MCNC: 20 MG/DL (ref 6–23)
CALCIUM SERPL-MCNC: 9.1 MG/DL (ref 8.6–10.2)
CHLORIDE SERPL-SCNC: 102 MMOL/L (ref 98–107)
CO2 SERPL-SCNC: 22 MMOL/L (ref 22–29)
CREAT SERPL-MCNC: 0.9 MG/DL (ref 0.5–1)
EKG ATRIAL RATE: 416 BPM
EKG Q-T INTERVAL: 336 MS
EKG QRS DURATION: 76 MS
EKG QTC CALCULATION (BAZETT): 424 MS
EKG R AXIS: 28 DEGREES
EKG T AXIS: 60 DEGREES
EKG VENTRICULAR RATE: 96 BPM
EOSINOPHIL # BLD: 0.08 K/UL (ref 0.05–0.5)
EOSINOPHILS RELATIVE PERCENT: 1 % (ref 0–6)
ERYTHROCYTE [DISTWIDTH] IN BLOOD BY AUTOMATED COUNT: 13.3 % (ref 11.5–15)
GFR, ESTIMATED: 65 ML/MIN/1.73M2
GLUCOSE SERPL-MCNC: 91 MG/DL (ref 74–99)
HCT VFR BLD AUTO: 38.1 % (ref 34–48)
HGB BLD-MCNC: 11.9 G/DL (ref 11.5–15.5)
IMM GRANULOCYTES # BLD AUTO: 0.05 K/UL (ref 0–0.58)
IMM GRANULOCYTES NFR BLD: 1 % (ref 0–5)
LYMPHOCYTES NFR BLD: 1.62 K/UL (ref 1.5–4)
LYMPHOCYTES RELATIVE PERCENT: 17 % (ref 20–42)
MCH RBC QN AUTO: 29.5 PG (ref 26–35)
MCHC RBC AUTO-ENTMCNC: 31.2 G/DL (ref 32–34.5)
MCV RBC AUTO: 94.3 FL (ref 80–99.9)
MONOCYTES NFR BLD: 0.89 K/UL (ref 0.1–0.95)
MONOCYTES NFR BLD: 9 % (ref 2–12)
NEUTROPHILS NFR BLD: 73 % (ref 43–80)
NEUTS SEG NFR BLD: 7.17 K/UL (ref 1.8–7.3)
PLATELET # BLD AUTO: 236 K/UL (ref 130–450)
PMV BLD AUTO: 9.7 FL (ref 7–12)
POTASSIUM SERPL-SCNC: 3.6 MMOL/L (ref 3.5–5)
RBC # BLD AUTO: 4.04 M/UL (ref 3.5–5.5)
SODIUM SERPL-SCNC: 135 MMOL/L (ref 132–146)
WBC OTHER # BLD: 9.8 K/UL (ref 4.5–11.5)

## 2024-11-25 PROCEDURE — 6370000000 HC RX 637 (ALT 250 FOR IP): Performed by: HOSPITALIST

## 2024-11-25 PROCEDURE — 2580000003 HC RX 258: Performed by: HOSPITALIST

## 2024-11-25 PROCEDURE — 6370000000 HC RX 637 (ALT 250 FOR IP): Performed by: STUDENT IN AN ORGANIZED HEALTH CARE EDUCATION/TRAINING PROGRAM

## 2024-11-25 PROCEDURE — 93010 ELECTROCARDIOGRAM REPORT: CPT | Performed by: INTERNAL MEDICINE

## 2024-11-25 PROCEDURE — 36415 COLL VENOUS BLD VENIPUNCTURE: CPT

## 2024-11-25 PROCEDURE — 1200000000 HC SEMI PRIVATE

## 2024-11-25 PROCEDURE — 80048 BASIC METABOLIC PNL TOTAL CA: CPT

## 2024-11-25 PROCEDURE — 99232 SBSQ HOSP IP/OBS MODERATE 35: CPT | Performed by: STUDENT IN AN ORGANIZED HEALTH CARE EDUCATION/TRAINING PROGRAM

## 2024-11-25 PROCEDURE — 85025 COMPLETE CBC W/AUTO DIFF WBC: CPT

## 2024-11-25 RX ORDER — SULFAMETHOXAZOLE AND TRIMETHOPRIM 800; 160 MG/1; MG/1
1 TABLET ORAL 2 TIMES DAILY
Status: COMPLETED | OUTPATIENT
Start: 2024-11-25 | End: 2024-11-29

## 2024-11-25 RX ORDER — METOPROLOL TARTRATE 25 MG/1
25 TABLET, FILM COATED ORAL 2 TIMES DAILY
Status: DISCONTINUED | OUTPATIENT
Start: 2024-11-25 | End: 2024-11-26

## 2024-11-25 RX ORDER — OXYCODONE HYDROCHLORIDE 5 MG/1
10 TABLET ORAL EVERY 4 HOURS PRN
Status: DISCONTINUED | OUTPATIENT
Start: 2024-11-25 | End: 2024-11-30 | Stop reason: HOSPADM

## 2024-11-25 RX ORDER — OXYCODONE AND ACETAMINOPHEN 5; 325 MG/1; MG/1
1 TABLET ORAL EVERY 6 HOURS PRN
Status: DISCONTINUED | OUTPATIENT
Start: 2024-11-25 | End: 2024-11-25

## 2024-11-25 RX ORDER — LIDOCAINE 4 G/G
1 PATCH TOPICAL DAILY
Status: DISCONTINUED | OUTPATIENT
Start: 2024-11-25 | End: 2024-11-30 | Stop reason: HOSPADM

## 2024-11-25 RX ORDER — OXYCODONE HYDROCHLORIDE 5 MG/1
5 TABLET ORAL EVERY 4 HOURS PRN
Status: DISCONTINUED | OUTPATIENT
Start: 2024-11-25 | End: 2024-11-30 | Stop reason: HOSPADM

## 2024-11-25 RX ADMIN — ALLOPURINOL 100 MG: 100 TABLET ORAL at 08:36

## 2024-11-25 RX ADMIN — SULFAMETHOXAZOLE AND TRIMETHOPRIM 1 TABLET: 800; 160 TABLET ORAL at 21:14

## 2024-11-25 RX ADMIN — LEVOTHYROXINE SODIUM 150 MCG: 75 TABLET ORAL at 05:57

## 2024-11-25 RX ADMIN — SULFAMETHOXAZOLE AND TRIMETHOPRIM 1 TABLET: 800; 160 TABLET ORAL at 14:45

## 2024-11-25 RX ADMIN — OXYCODONE HYDROCHLORIDE AND ACETAMINOPHEN 1 TABLET: 5; 325 TABLET ORAL at 05:57

## 2024-11-25 RX ADMIN — APIXABAN 5 MG: 5 TABLET, FILM COATED ORAL at 20:30

## 2024-11-25 RX ADMIN — METOPROLOL TARTRATE 50 MG: 50 TABLET, FILM COATED ORAL at 05:57

## 2024-11-25 RX ADMIN — TIZANIDINE 4 MG: 4 TABLET ORAL at 13:24

## 2024-11-25 RX ADMIN — OXYCODONE 10 MG: 5 TABLET ORAL at 20:30

## 2024-11-25 RX ADMIN — MICONAZOLE NITRATE: 20 POWDER TOPICAL at 08:37

## 2024-11-25 RX ADMIN — CYCLOBENZAPRINE 10 MG: 10 TABLET, FILM COATED ORAL at 01:06

## 2024-11-25 RX ADMIN — ATORVASTATIN CALCIUM 20 MG: 20 TABLET, FILM COATED ORAL at 08:37

## 2024-11-25 RX ADMIN — SODIUM CHLORIDE, PRESERVATIVE FREE 10 ML: 5 INJECTION INTRAVENOUS at 21:17

## 2024-11-25 RX ADMIN — TIZANIDINE 4 MG: 4 TABLET ORAL at 20:30

## 2024-11-25 RX ADMIN — METOPROLOL TARTRATE 25 MG: 25 TABLET, FILM COATED ORAL at 20:29

## 2024-11-25 RX ADMIN — APIXABAN 5 MG: 5 TABLET, FILM COATED ORAL at 08:37

## 2024-11-25 RX ADMIN — OXYCODONE 10 MG: 5 TABLET ORAL at 13:53

## 2024-11-25 RX ADMIN — ASPIRIN 81 MG: 81 TABLET, COATED ORAL at 08:37

## 2024-11-25 RX ADMIN — MICONAZOLE NITRATE: 20 POWDER TOPICAL at 20:50

## 2024-11-25 RX ADMIN — SODIUM CHLORIDE, PRESERVATIVE FREE 10 ML: 5 INJECTION INTRAVENOUS at 08:38

## 2024-11-25 ASSESSMENT — PAIN DESCRIPTION - ORIENTATION
ORIENTATION: RIGHT;LEFT
ORIENTATION: RIGHT

## 2024-11-25 ASSESSMENT — PAIN SCALES - GENERAL
PAINLEVEL_OUTOF10: 10
PAINLEVEL_OUTOF10: 0
PAINLEVEL_OUTOF10: 10
PAINLEVEL_OUTOF10: 9
PAINLEVEL_OUTOF10: 10
PAINLEVEL_OUTOF10: 7
PAINLEVEL_OUTOF10: 9
PAINLEVEL_OUTOF10: 10
PAINLEVEL_OUTOF10: 7

## 2024-11-25 ASSESSMENT — PAIN DESCRIPTION - FREQUENCY: FREQUENCY: CONTINUOUS

## 2024-11-25 ASSESSMENT — PAIN DESCRIPTION - LOCATION
LOCATION: NECK
LOCATION: SHOULDER;NECK
LOCATION: NECK
LOCATION: NECK
LOCATION: SHOULDER;NECK

## 2024-11-25 ASSESSMENT — PAIN DESCRIPTION - ONSET: ONSET: ON-GOING

## 2024-11-25 ASSESSMENT — PAIN - FUNCTIONAL ASSESSMENT
PAIN_FUNCTIONAL_ASSESSMENT: ACTIVITIES ARE NOT PREVENTED
PAIN_FUNCTIONAL_ASSESSMENT: ACTIVITIES ARE NOT PREVENTED
PAIN_FUNCTIONAL_ASSESSMENT: PREVENTS OR INTERFERES WITH MANY ACTIVE NOT PASSIVE ACTIVITIES

## 2024-11-25 ASSESSMENT — PAIN DESCRIPTION - DESCRIPTORS
DESCRIPTORS: SHOOTING;SORE
DESCRIPTORS: STABBING
DESCRIPTORS: ACHING;SHARP;PRESSURE
DESCRIPTORS: SHARP

## 2024-11-25 ASSESSMENT — PAIN DESCRIPTION - PAIN TYPE: TYPE: ACUTE PAIN

## 2024-11-25 NOTE — H&P
[facility] Hospitalist Group History and Physical      ASSESSMENT:      Principal Problem:    Atrial fibrillation with RVR (HCC)  Active Problems:    DM (diabetes mellitus), type 2 (HCC)    Peripheral vascular disorder due to diabetes mellitus (HCC)    SHALA on CPAP    Obesity hypoventilation syndrome    Class 3 severe obesity with body mass index (BMI) of 50.0 to 59.9 in adult    Hypothyroidism    Hyperlipidemia    Acquired hypothyroidism    Atherosclerotic heart disease of native coronary artery without angina pectoris    Gastroesophageal reflux disease    History of gastric bypass    Orthostatic hypotension  Resolved Problems:    * No resolved hospital problems. *      PLAN:    Intractable neck and back pain: Suspect musculoskeletal.  CT C-spine was nonacute.  Change Flexeril to Zanaflex.  Add Percocet and Lidoderm patch.  Monitor  Atrial fibrillation with RVR: [id number] score elevated.  Now in CVR.  Continue home Lopressor & Eliquis 5 mg twice   COPD: As needed albuterol  History of TIA: Continue ASA  HTN: Normotensive.  Hold losartan and Norvasc for now  Tobacco use disorder: NRT ordered          Consultations Ordered:  IP CONSULT TO INTERNAL MEDICINE    Code Status: Prior  VTE prophylaxis: Full code  Dispo: obs med/tele      CHIEF COMPLAINT:  had concerns including Neck Pain and Shoulder Pain (right).    History of Present Illness:      74 y.o. female PMH of COPD, PAF on Eliquis, HTN, hypothyroidism, HLD, SHALA/OHS on CPAP and other medical comorbidities presented to the ED for severe intractable neck/upper back pain with radiation to right shoulder.  She woke up early this morning with the pain.  She has not had anything like this before.  She did manage to make it to the bathroom but could only make it back to her recliner she laid down and fell asleep when she woke up the pain was worse.  She denies any chest pain, shortness of breath, cough, nausea or vomiting.  At present she is uncomfortable but in

## 2024-11-25 NOTE — PROGRESS NOTES
4 Eyes Skin Assessment     NAME:  Olimpia Fish  YOB: 1950  MEDICAL RECORD NUMBER:  [id number]    The patient is being assessed for  Admission    I agree that at least one RN has performed a thorough Head to Toe Skin Assessment on the patient. ALL assessment sites listed below have been assessed.      Areas assessed by both nurses:    Head, Face, Ears, Shoulders, Back, Chest, Arms, Elbows, Hands, Sacrum. Buttock, Coccyx, Ischium, Legs. Feet and Heels, and Under Medical Devices         Does the Patient have a Wound? No noted wound(s)       Ronald Prevention initiated by RN: Yes  Wound Care Orders initiated by RN: No    Pressure Injury (Stage 3,4, Unstageable, DTI, NWPT, and Complex wounds) if present, place Wound referral order by RN under : No    New Ostomies, if present place, Ostomy referral order under : No     Nurse 1 eSignature: Electronically signed by Nani Grant RN on 11/25/24 at 6:38 AM EST    **SHARE this note so that the co-signing nurse can place an eSignature**    Nurse 2 eSignature: Electronically signed by NELL JEAN-BAPTISTE RN on 11/25/24 at 6:39 AM EST

## 2024-11-25 NOTE — CARE COORDINATION
Social Work / Discharge Planning : Patient admitted with neck pain. SW met with patient briefly. SW assisted patient with phone use. Patient has pets at home and actively trying to get a hold of family. Patient states that she will return HOME at discharge due to her pets.. Patient resides alone in a one floor home. Patient has motorized wheelchair and ww and cane.PCP is DR Thayer. [facility] [employer] [facility]. Patient is active through [facility] .Patient  is Glendy Beal [phone]. SW called and left VM and await response in  regards to active services. Therapy is ordered and awaiting therapy input to better assist with discharge planning. Patient currently on RA. SERGO to follow. Electronically signed by NADIA Hudson on 11/25/24 at 2:56 PM EST

## 2024-11-25 NOTE — ACP (ADVANCE CARE PLANNING)
Advance Care Planning   Healthcare Decision Maker:    Primary Decision Maker: yesi narayan - Grandchild - [phone]    Click here to complete Healthcare Decision Makers including selection of the Healthcare Decision Maker Relationship (ie \"Primary\").  Today we documented Decision Maker(s) consistent with Legal Next of Kin hierarchy.

## 2024-11-25 NOTE — ED NOTES
ED to Inpatient Handoff Report    Notified Davon that electronic handoff available and patient ready for transport to room 416.    Safety Risks: None identified    Patient in Restraints: no    Constant Observer or Patient : no    Telemetry Monitoring Ordered :Yes           Order to transfer to unit without monitor:YES    Last MEWS: 1 Time completed: 1901    Deterioration Index Score:   Predictive Model Details          22 (Normal)  Factor Value    Calculated 11/24/2024 19:22 60% Age 74 years old    Deterioration Index Model 18% WBC count abnormal (16.0 k/uL)     7% Pulse 96     6% Sodium 132 mmol/L     5% Systolic 133     3% Respiratory rate 17     1% Potassium 4.0 mmol/L     0% Temperature 97.7 °F (36.5 °C)     0% Pulse oximetry 95 %     0% Hematocrit 38.8 %        Vitals:    11/24/24 1834 11/24/24 1855 11/24/24 1859 11/24/24 1901   BP: 120/68   133/74   Pulse: (!) 114   96   Resp:  16 14 17   Temp:    97.7 °F (36.5 °C)   SpO2:  99% 97% 95%   Weight:       Height:             Opportunity for questions and clarification was provided.

## 2024-11-25 NOTE — PLAN OF CARE
Problem: Pain  Goal: Verbalizes/displays adequate comfort level or baseline comfort level  Outcome: Progressing     Problem: Musculoskeletal - Adult  Goal: Return mobility to safest level of function  Outcome: Progressing

## 2024-11-25 NOTE — PROGRESS NOTES
[facility] Hospitalist Progress Note    Admitting Date and Time: 11/24/2024  9:58 AM  Admit Dx: Neck pain [M54.2]  Atrial fibrillation with RVR (HCC) [I48.91]  Acute pain of left shoulder [M25.512]  Atrial fibrillation, unspecified type (HCC) [I48.91]  Atrial fibrillation with rapid ventricular response (HCC) [I48.91]    Subjective:  Patient is being followed for Neck pain [M54.2]  Atrial fibrillation with RVR (HCC) [I48.91]  Acute pain of left shoulder [M25.512]  Atrial fibrillation, unspecified type (HCC) [I48.91]  Atrial fibrillation with rapid ventricular response (HCC) [I48.91]   Pt was seen and examined today. Denies any new issues. States pain is slightly better but neck still hurting significantly. She does admit to burning with urination.    ROS: denies fever, chills, cp, sob, n/v, HA unless stated above.     lidocaine  1 patch TransDERmal Daily    metoprolol tartrate  25 mg Oral BID    sulfamethoxazole-trimethoprim  1 tablet Oral BID    miconazole   Topical BID    allopurinol  100 mg Oral Daily    aspirin  81 mg Oral Daily    atorvastatin  20 mg Oral Daily    apixaban  5 mg Oral BID    levothyroxine  150 mcg Oral Daily    sodium chloride flush  5-40 mL IntraVENous 2 times per day     tiZANidine, 4 mg, Q6H PRN  oxyCODONE, 5 mg, Q4H PRN   Or  oxyCODONE, 10 mg, Q4H PRN  HYDROmorphone, 0.25 mg, Q3H PRN   Or  HYDROmorphone, 0.5 mg, Q3H PRN  sodium chloride flush, 5-40 mL, PRN  sodium chloride, , PRN  potassium chloride, 40 mEq, PRN   Or  potassium alternative oral replacement, 40 mEq, PRN  magnesium sulfate, 2,000 mg, PRN  ondansetron, 4 mg, Q8H PRN   Or  ondansetron, 4 mg, Q6H PRN  melatonin, 3 mg, Nightly PRN  senna, 1 tablet, Daily PRN  aluminum & magnesium hydroxide-simethicone, 30 mL, Q6H PRN  acetaminophen, 650 mg, Q6H PRN   Or  acetaminophen, 650 mg, Q6H PRN  polyethylene glycol, 17 g, Daily PRN         Objective:    /83   Pulse 86   Temp 97.5 °F (36.4 °C) (Infrared)   Resp 18   Ht

## 2024-11-26 ENCOUNTER — APPOINTMENT (OUTPATIENT)
Dept: GENERAL RADIOLOGY | Age: 74
DRG: 309 | End: 2024-11-26
Payer: MEDICARE

## 2024-11-26 PROBLEM — M21.961 FOOT DEFORMITY, RIGHT: Status: ACTIVE | Noted: 2024-11-26

## 2024-11-26 LAB
ANION GAP SERPL CALCULATED.3IONS-SCNC: 15 MMOL/L (ref 7–16)
BASOPHILS # BLD: 0.05 K/UL (ref 0–0.2)
BASOPHILS NFR BLD: 1 % (ref 0–2)
BUN SERPL-MCNC: 22 MG/DL (ref 6–23)
CALCIUM SERPL-MCNC: 9.2 MG/DL (ref 8.6–10.2)
CHLORIDE SERPL-SCNC: 100 MMOL/L (ref 98–107)
CO2 SERPL-SCNC: 21 MMOL/L (ref 22–29)
CREAT SERPL-MCNC: 1.3 MG/DL (ref 0.5–1)
EOSINOPHIL # BLD: 0.29 K/UL (ref 0.05–0.5)
EOSINOPHILS RELATIVE PERCENT: 3 % (ref 0–6)
ERYTHROCYTE [DISTWIDTH] IN BLOOD BY AUTOMATED COUNT: 13.3 % (ref 11.5–15)
GFR, ESTIMATED: 43 ML/MIN/1.73M2
GLUCOSE SERPL-MCNC: 103 MG/DL (ref 74–99)
HCT VFR BLD AUTO: 40.7 % (ref 34–48)
HGB BLD-MCNC: 12.9 G/DL (ref 11.5–15.5)
IMM GRANULOCYTES # BLD AUTO: 0.04 K/UL (ref 0–0.58)
IMM GRANULOCYTES NFR BLD: 0 % (ref 0–5)
LYMPHOCYTES NFR BLD: 2.35 K/UL (ref 1.5–4)
LYMPHOCYTES RELATIVE PERCENT: 24 % (ref 20–42)
MCH RBC QN AUTO: 29.5 PG (ref 26–35)
MCHC RBC AUTO-ENTMCNC: 31.7 G/DL (ref 32–34.5)
MCV RBC AUTO: 92.9 FL (ref 80–99.9)
MONOCYTES NFR BLD: 1.04 K/UL (ref 0.1–0.95)
MONOCYTES NFR BLD: 10 % (ref 2–12)
NEUTROPHILS NFR BLD: 62 % (ref 43–80)
NEUTS SEG NFR BLD: 6.24 K/UL (ref 1.8–7.3)
PLATELET # BLD AUTO: 261 K/UL (ref 130–450)
PMV BLD AUTO: 9.7 FL (ref 7–12)
POTASSIUM SERPL-SCNC: 4.3 MMOL/L (ref 3.5–5)
RBC # BLD AUTO: 4.38 M/UL (ref 3.5–5.5)
SODIUM SERPL-SCNC: 136 MMOL/L (ref 132–146)
WBC OTHER # BLD: 10 K/UL (ref 4.5–11.5)

## 2024-11-26 PROCEDURE — 6370000000 HC RX 637 (ALT 250 FOR IP): Performed by: STUDENT IN AN ORGANIZED HEALTH CARE EDUCATION/TRAINING PROGRAM

## 2024-11-26 PROCEDURE — 6370000000 HC RX 637 (ALT 250 FOR IP): Performed by: HOSPITALIST

## 2024-11-26 PROCEDURE — 97161 PT EVAL LOW COMPLEX 20 MIN: CPT

## 2024-11-26 PROCEDURE — 2580000003 HC RX 258: Performed by: HOSPITALIST

## 2024-11-26 PROCEDURE — 1200000000 HC SEMI PRIVATE

## 2024-11-26 PROCEDURE — 97110 THERAPEUTIC EXERCISES: CPT

## 2024-11-26 PROCEDURE — 36415 COLL VENOUS BLD VENIPUNCTURE: CPT

## 2024-11-26 PROCEDURE — 6360000002 HC RX W HCPCS: Performed by: HOSPITALIST

## 2024-11-26 PROCEDURE — 80048 BASIC METABOLIC PNL TOTAL CA: CPT

## 2024-11-26 PROCEDURE — 99232 SBSQ HOSP IP/OBS MODERATE 35: CPT | Performed by: STUDENT IN AN ORGANIZED HEALTH CARE EDUCATION/TRAINING PROGRAM

## 2024-11-26 PROCEDURE — 85025 COMPLETE CBC W/AUTO DIFF WBC: CPT

## 2024-11-26 PROCEDURE — 73630 X-RAY EXAM OF FOOT: CPT

## 2024-11-26 RX ORDER — METOPROLOL SUCCINATE 50 MG/1
50 TABLET, EXTENDED RELEASE ORAL DAILY
Status: DISCONTINUED | OUTPATIENT
Start: 2024-11-26 | End: 2024-11-30 | Stop reason: HOSPADM

## 2024-11-26 RX ADMIN — TIZANIDINE 4 MG: 4 TABLET ORAL at 13:05

## 2024-11-26 RX ADMIN — ASPIRIN 81 MG: 81 TABLET, COATED ORAL at 09:50

## 2024-11-26 RX ADMIN — APIXABAN 5 MG: 5 TABLET, FILM COATED ORAL at 09:50

## 2024-11-26 RX ADMIN — SODIUM CHLORIDE, PRESERVATIVE FREE 10 ML: 5 INJECTION INTRAVENOUS at 08:47

## 2024-11-26 RX ADMIN — TIZANIDINE 4 MG: 4 TABLET ORAL at 04:43

## 2024-11-26 RX ADMIN — APIXABAN 5 MG: 5 TABLET, FILM COATED ORAL at 21:29

## 2024-11-26 RX ADMIN — ACETAMINOPHEN 650 MG: 325 TABLET ORAL at 15:58

## 2024-11-26 RX ADMIN — METOPROLOL SUCCINATE 50 MG: 50 TABLET, EXTENDED RELEASE ORAL at 09:50

## 2024-11-26 RX ADMIN — MICONAZOLE NITRATE: 20 POWDER TOPICAL at 13:03

## 2024-11-26 RX ADMIN — MICONAZOLE NITRATE: 20 POWDER TOPICAL at 21:31

## 2024-11-26 RX ADMIN — OXYCODONE 10 MG: 5 TABLET ORAL at 00:40

## 2024-11-26 RX ADMIN — ATORVASTATIN CALCIUM 20 MG: 20 TABLET, FILM COATED ORAL at 09:50

## 2024-11-26 RX ADMIN — ALLOPURINOL 100 MG: 100 TABLET ORAL at 09:50

## 2024-11-26 RX ADMIN — OXYCODONE 10 MG: 5 TABLET ORAL at 21:29

## 2024-11-26 RX ADMIN — SULFAMETHOXAZOLE AND TRIMETHOPRIM 1 TABLET: 800; 160 TABLET ORAL at 21:29

## 2024-11-26 RX ADMIN — OXYCODONE 10 MG: 5 TABLET ORAL at 13:02

## 2024-11-26 RX ADMIN — LEVOTHYROXINE SODIUM 150 MCG: 75 TABLET ORAL at 05:04

## 2024-11-26 RX ADMIN — OXYCODONE 10 MG: 5 TABLET ORAL at 04:42

## 2024-11-26 RX ADMIN — ONDANSETRON 4 MG: 2 INJECTION INTRAMUSCULAR; INTRAVENOUS at 08:46

## 2024-11-26 RX ADMIN — SULFAMETHOXAZOLE AND TRIMETHOPRIM 1 TABLET: 800; 160 TABLET ORAL at 09:50

## 2024-11-26 RX ADMIN — TIZANIDINE 4 MG: 4 TABLET ORAL at 21:29

## 2024-11-26 RX ADMIN — SODIUM CHLORIDE, PRESERVATIVE FREE 10 ML: 5 INJECTION INTRAVENOUS at 21:31

## 2024-11-26 ASSESSMENT — PAIN DESCRIPTION - LOCATION
LOCATION: HEAD
LOCATION: NECK;SHOULDER
LOCATION: NECK
LOCATION: NECK;SHOULDER
LOCATION: NECK

## 2024-11-26 ASSESSMENT — PAIN DESCRIPTION - FREQUENCY
FREQUENCY: CONTINUOUS

## 2024-11-26 ASSESSMENT — PAIN DESCRIPTION - DESCRIPTORS
DESCRIPTORS: ACHING;DISCOMFORT
DESCRIPTORS: ACHING;DISCOMFORT
DESCRIPTORS: ACHING;SHARP;THROBBING
DESCRIPTORS: ACHING
DESCRIPTORS: ACHING
DESCRIPTORS: ACHING;SHARP
DESCRIPTORS: DISCOMFORT

## 2024-11-26 ASSESSMENT — PAIN SCALES - GENERAL
PAINLEVEL_OUTOF10: 10
PAINLEVEL_OUTOF10: 8

## 2024-11-26 ASSESSMENT — PAIN SCALES - WONG BAKER
WONGBAKER_NUMERICALRESPONSE: NO HURT
WONGBAKER_NUMERICALRESPONSE: NO HURT

## 2024-11-26 ASSESSMENT — PAIN DESCRIPTION - ORIENTATION
ORIENTATION: RIGHT;LEFT

## 2024-11-26 NOTE — PROGRESS NOTES
Physician Progress Note      PATIENT:               ASHLEY GARCIA  [facility] #:                  [id number]  :                       1950  ADMIT DATE:       2024 9:58 AM  DISCH DATE:  RESPONDING  PROVIDER #:        Isaias Peña MD          QUERY TEXT:    Patient admitted with neck and back pain, noted to have  atrial fibrillation   and is maintained on Eliquis. If possible, please document in progress notes   and discharge summary if you are evaluating and/or treating any of the   following:?  ?  The medical record reflects the following:  Risk Factors: Hx of TIA, Hyperlipidemia, on anticoagulants, obesity, DM  Clinical Indicators: Per H/P  \"Atrial fibrillation with RVR: [id number]   score elevated. Continue home Lopressor & Eliquis.\"    Treatment: Eliquis, Lipitor, Toprol XL,  Options provided:  -- Secondary hypercoagulable state in a patient with atrial fibrillation  -- Other - I will add my own diagnosis  -- Disagree - Not applicable / Not valid  -- Disagree - Clinically unable to determine / Unknown  -- Refer to Clinical Documentation Reviewer    PROVIDER RESPONSE TEXT:    This patient has secondary hypercoagulable state in a patient with atrial   fibrillation.    Query created by: Liz Marcelino on 2024 2:20 PM      Electronically signed by:  Isaias Peña MD 2024 2:27 PM

## 2024-11-26 NOTE — PROGRESS NOTES
uncomfortable and assisted back to bed.  PT sat EOB 8 minutes for posture and performed ankle pumps and LAQs for LE strengthening.      Pt's/ family goals   1. To go home    Prognosis is good for reaching above PT goals.    Patient and or family understand(s) diagnosis, prognosis, and plan of care.  yes    PHYSICAL THERAPY PLAN OF CARE:    PT POC is established based on physician order and patient diagnosis     Diagnosis:  Neck pain [M54.2]  Atrial fibrillation with RVR (HCC) [I48.91]  Acute pain of left shoulder [M25.512]  Atrial fibrillation, unspecified type (HCC) [I48.91]  Atrial fibrillation with rapid ventricular response (HCC) [I48.91]    Current Treatment Recommendations:     [x] Strengthening to improve independence with functional mobility   [] ROM to improve independence with functional mobility   [x] Balance Training to improve static/dynamic balance and to reduce fall risk  [x] Endurance Training to improve activity tolerance during functional mobility   [x] Transfer Training to improve safety and independence with all functional transfers   [x] Gait Training to improve gait mechanics, endurance and assess need for appropriate assistive device  [] Stair Training in preparation for safe discharge home and/or into the community   [] Positioning to prevent skin breakdown and contractures  [x] Safety and Education Training   [x] Patient/Caregiver Education   [] HEP  [] Other     PT long term treatment goals are located in above grid    Frequency of treatments: 2-5x/week x 1-2 weeks.    Time in  0921  Time out  0945    Total Treatment Time  10 minutes     Evaluation Time includes thorough review of current medical information, gathering information on past medical history/social history and prior level of function, completion of standardized testing/informal observation of tasks, assessment of data and education on plan of care and goals.    CPT codes:  [x] Low Complexity PT evaluation 49160  [] Moderate

## 2024-11-26 NOTE — PROGRESS NOTES
[facility] Hospitalist Progress Note    Admitting Date and Time: 11/24/2024  9:58 AM  Admit Dx: Neck pain [M54.2]  Atrial fibrillation with RVR (HCC) [I48.91]  Acute pain of left shoulder [M25.512]  Atrial fibrillation, unspecified type (HCC) [I48.91]  Atrial fibrillation with rapid ventricular response (HCC) [I48.91]    Subjective:  Patient is being followed for Neck pain [M54.2]  Atrial fibrillation with RVR (HCC) [I48.91]  Acute pain of left shoulder [M25.512]  Atrial fibrillation, unspecified type (HCC) [I48.91]  Atrial fibrillation with rapid ventricular response (HCC) [I48.91]   Pt was seen and examined today. Denies any new issues. Admits to hitting her right foot on ramp with power wheelchair    ROS: denies fever, chills, cp, sob, n/v, HA unless stated above.     metoprolol succinate  50 mg Oral Daily    lidocaine  1 patch TransDERmal Daily    sulfamethoxazole-trimethoprim  1 tablet Oral BID    miconazole   Topical BID    allopurinol  100 mg Oral Daily    aspirin  81 mg Oral Daily    atorvastatin  20 mg Oral Daily    apixaban  5 mg Oral BID    levothyroxine  150 mcg Oral Daily    sodium chloride flush  5-40 mL IntraVENous 2 times per day     tiZANidine, 4 mg, Q6H PRN  oxyCODONE, 5 mg, Q4H PRN   Or  oxyCODONE, 10 mg, Q4H PRN  HYDROmorphone, 0.25 mg, Q3H PRN   Or  HYDROmorphone, 0.5 mg, Q3H PRN  sodium chloride flush, 5-40 mL, PRN  sodium chloride, , PRN  potassium chloride, 40 mEq, PRN   Or  potassium alternative oral replacement, 40 mEq, PRN  magnesium sulfate, 2,000 mg, PRN  ondansetron, 4 mg, Q8H PRN   Or  ondansetron, 4 mg, Q6H PRN  melatonin, 3 mg, Nightly PRN  senna, 1 tablet, Daily PRN  aluminum & magnesium hydroxide-simethicone, 30 mL, Q6H PRN  acetaminophen, 650 mg, Q6H PRN   Or  acetaminophen, 650 mg, Q6H PRN  polyethylene glycol, 17 g, Daily PRN         Objective:    BP (!) 95/50   Pulse (!) 110   Temp 98 °F (36.7 °C) (Temporal)   Resp 18   Ht 1.651 m (5' 5\")   Wt (!) 147.6 kg (325 lb  6.4 oz)   SpO2 97%   BMI 54.15 kg/m²     General Appearance: alert and oriented to person, place and time and in no acute distress  Skin: warm and dry  Head: normocephalic and atraumatic  Pulmonary/Chest: clear to auscultation bilaterally- no wheezes, rales or rhonchi, normal air movement, no respiratory distress  Cardiovascular: normal rate, normal S1 and S2  Abdomen: soft, non-tender, non-distended, normal bowel sounds. Left lower abdominal wound noted, does not appear infected  Extremities: no cyanosis, no clubbing and no edema. Right foot deformity noted      Recent Labs     11/24/24  1443 11/25/24  0546    135   K 4.0 3.6   CL 98 102   CO2 22 22   BUN 20 20   CREATININE 0.8 0.9   GLUCOSE 102* 91   CALCIUM 9.4 9.1       Recent Labs     11/24/24  1443 11/25/24  0546 11/26/24  0947   WBC 16.0* 9.8 10.0   RBC 4.25 4.04 4.38   HGB 12.7 11.9 12.9   HCT 38.8 38.1 40.7   MCV 91.3 94.3 92.9   MCH 29.9 29.5 29.5   MCHC 32.7 31.2* 31.7*   RDW 13.2 13.3 13.3    236 261   MPV 9.3 9.7 9.7       Radiology:   CT CSpine W/O Contrast   Final Result   No acute abnormality of the cervical spine.         XR FOOT RIGHT (MIN 3 VIEWS)    (Results Pending)       Assessment:    Principal Problem:    Atrial fibrillation with RVR ([facility])  Active Problems:    Obesity hypoventilation syndrome    SHALA on CPAP    Hyperlipidemia    Acquired hypothyroidism    DM (diabetes mellitus), type 2 ([facility])    Class 3 severe obesity with body mass index (BMI) of 50.0 to 59.9 in adult    Hypothyroidism    Atherosclerotic heart disease of native coronary artery without angina pectoris    Gastroesophageal reflux disease    History of gastric bypass    Orthostatic hypotension    Peripheral vascular disorder due to diabetes mellitus ([facility])    Acute cystitis without hematuria    Atrial fibrillation with rapid ventricular response ([facility])    Foot deformity, right  Resolved Problems:    * No resolved hospital problems. *      Plan:  Switch Lopressor to

## 2024-11-26 NOTE — PLAN OF CARE
Problem: Chronic Conditions and Co-morbidities  Goal: Patient's chronic conditions and co-morbidity symptoms are monitored and maintained or improved  Outcome: Progressing     Problem: Discharge Planning  Goal: Discharge to home or other facility with appropriate resources  Outcome: Progressing     Problem: Pain  Goal: Verbalizes/displays adequate comfort level or baseline comfort level  11/26/2024 0047 by Mckenna Turk, RN  Outcome: Progressing  11/25/2024 1422 by Lucila Lew, RN  Outcome: Progressing

## 2024-11-27 ENCOUNTER — APPOINTMENT (OUTPATIENT)
Dept: ULTRASOUND IMAGING | Age: 74
DRG: 309 | End: 2024-11-27
Payer: MEDICARE

## 2024-11-27 LAB
ANION GAP SERPL CALCULATED.3IONS-SCNC: 12 MMOL/L (ref 7–16)
BASOPHILS # BLD: 0.04 K/UL (ref 0–0.2)
BASOPHILS NFR BLD: 1 % (ref 0–2)
BUN SERPL-MCNC: 22 MG/DL (ref 6–23)
CALCIUM SERPL-MCNC: 9.5 MG/DL (ref 8.6–10.2)
CHLORIDE SERPL-SCNC: 100 MMOL/L (ref 98–107)
CO2 SERPL-SCNC: 22 MMOL/L (ref 22–29)
CREAT SERPL-MCNC: 1.1 MG/DL (ref 0.5–1)
EOSINOPHIL # BLD: 0.32 K/UL (ref 0.05–0.5)
EOSINOPHILS RELATIVE PERCENT: 4 % (ref 0–6)
ERYTHROCYTE [DISTWIDTH] IN BLOOD BY AUTOMATED COUNT: 13.2 % (ref 11.5–15)
GFR, ESTIMATED: 51 ML/MIN/1.73M2
GLUCOSE SERPL-MCNC: 116 MG/DL (ref 74–99)
HCT VFR BLD AUTO: 41.8 % (ref 34–48)
HGB BLD-MCNC: 13 G/DL (ref 11.5–15.5)
IMM GRANULOCYTES # BLD AUTO: 0.04 K/UL (ref 0–0.58)
IMM GRANULOCYTES NFR BLD: 1 % (ref 0–5)
LYMPHOCYTES NFR BLD: 1.56 K/UL (ref 1.5–4)
LYMPHOCYTES RELATIVE PERCENT: 19 % (ref 20–42)
MCH RBC QN AUTO: 29.7 PG (ref 26–35)
MCHC RBC AUTO-ENTMCNC: 31.1 G/DL (ref 32–34.5)
MCV RBC AUTO: 95.7 FL (ref 80–99.9)
MICROORGANISM SPEC CULT: ABNORMAL
MONOCYTES NFR BLD: 0.42 K/UL (ref 0.1–0.95)
MONOCYTES NFR BLD: 5 % (ref 2–12)
NEUTROPHILS NFR BLD: 71 % (ref 43–80)
NEUTS SEG NFR BLD: 5.84 K/UL (ref 1.8–7.3)
PLATELET # BLD AUTO: 217 K/UL (ref 130–450)
PMV BLD AUTO: 9.8 FL (ref 7–12)
POTASSIUM SERPL-SCNC: 4.6 MMOL/L (ref 3.5–5)
RBC # BLD AUTO: 4.37 M/UL (ref 3.5–5.5)
SERVICE CMNT-IMP: ABNORMAL
SODIUM SERPL-SCNC: 134 MMOL/L (ref 132–146)
SPECIMEN DESCRIPTION: ABNORMAL
WBC OTHER # BLD: 8.2 K/UL (ref 4.5–11.5)

## 2024-11-27 PROCEDURE — 6370000000 HC RX 637 (ALT 250 FOR IP): Performed by: STUDENT IN AN ORGANIZED HEALTH CARE EDUCATION/TRAINING PROGRAM

## 2024-11-27 PROCEDURE — 99232 SBSQ HOSP IP/OBS MODERATE 35: CPT | Performed by: STUDENT IN AN ORGANIZED HEALTH CARE EDUCATION/TRAINING PROGRAM

## 2024-11-27 PROCEDURE — 85025 COMPLETE CBC W/AUTO DIFF WBC: CPT

## 2024-11-27 PROCEDURE — 2500000003 HC RX 250 WO HCPCS: Performed by: HOSPITALIST

## 2024-11-27 PROCEDURE — 2580000003 HC RX 258: Performed by: HOSPITALIST

## 2024-11-27 PROCEDURE — 93923 UPR/LXTR ART STDY 3+ LVLS: CPT

## 2024-11-27 PROCEDURE — 1200000000 HC SEMI PRIVATE

## 2024-11-27 PROCEDURE — 36415 COLL VENOUS BLD VENIPUNCTURE: CPT

## 2024-11-27 PROCEDURE — 80048 BASIC METABOLIC PNL TOTAL CA: CPT

## 2024-11-27 PROCEDURE — 6370000000 HC RX 637 (ALT 250 FOR IP): Performed by: HOSPITALIST

## 2024-11-27 PROCEDURE — 6370000000 HC RX 637 (ALT 250 FOR IP)

## 2024-11-27 RX ORDER — PANTOPRAZOLE SODIUM 40 MG/1
40 TABLET, DELAYED RELEASE ORAL DAILY
COMMUNITY

## 2024-11-27 RX ORDER — OSTOMY ADHESIVE
1 STRIP MISCELLANEOUS ONCE
Status: COMPLETED | OUTPATIENT
Start: 2024-11-27 | End: 2024-11-27

## 2024-11-27 RX ORDER — TRAMADOL HYDROCHLORIDE 50 MG/1
50 TABLET ORAL EVERY 8 HOURS PRN
Status: ON HOLD | COMMUNITY
End: 2024-11-29 | Stop reason: HOSPADM

## 2024-11-27 RX ORDER — MONTELUKAST SODIUM 10 MG/1
10 TABLET ORAL NIGHTLY
COMMUNITY

## 2024-11-27 RX ADMIN — METOPROLOL SUCCINATE 50 MG: 50 TABLET, EXTENDED RELEASE ORAL at 08:51

## 2024-11-27 RX ADMIN — Medication 3 MG: at 20:32

## 2024-11-27 RX ADMIN — SODIUM CHLORIDE, PRESERVATIVE FREE 10 ML: 5 INJECTION INTRAVENOUS at 20:33

## 2024-11-27 RX ADMIN — OXYCODONE 10 MG: 5 TABLET ORAL at 01:47

## 2024-11-27 RX ADMIN — ATORVASTATIN CALCIUM 20 MG: 20 TABLET, FILM COATED ORAL at 08:51

## 2024-11-27 RX ADMIN — Medication 1 EACH: at 17:00

## 2024-11-27 RX ADMIN — OXYCODONE 10 MG: 5 TABLET ORAL at 16:47

## 2024-11-27 RX ADMIN — ASPIRIN 81 MG: 81 TABLET, COATED ORAL at 08:51

## 2024-11-27 RX ADMIN — ALLOPURINOL 100 MG: 100 TABLET ORAL at 08:51

## 2024-11-27 RX ADMIN — OXYCODONE 5 MG: 5 TABLET ORAL at 21:00

## 2024-11-27 RX ADMIN — MICONAZOLE NITRATE: 20 POWDER TOPICAL at 08:53

## 2024-11-27 RX ADMIN — LEVOTHYROXINE SODIUM 150 MCG: 75 TABLET ORAL at 05:13

## 2024-11-27 RX ADMIN — TIZANIDINE 4 MG: 4 TABLET ORAL at 16:48

## 2024-11-27 RX ADMIN — APIXABAN 5 MG: 5 TABLET, FILM COATED ORAL at 08:51

## 2024-11-27 RX ADMIN — SODIUM CHLORIDE, PRESERVATIVE FREE 10 ML: 5 INJECTION INTRAVENOUS at 08:53

## 2024-11-27 RX ADMIN — TIZANIDINE 4 MG: 4 TABLET ORAL at 08:52

## 2024-11-27 RX ADMIN — TIZANIDINE 4 MG: 4 TABLET ORAL at 22:59

## 2024-11-27 RX ADMIN — APIXABAN 5 MG: 5 TABLET, FILM COATED ORAL at 20:32

## 2024-11-27 RX ADMIN — OXYCODONE 10 MG: 5 TABLET ORAL at 08:52

## 2024-11-27 RX ADMIN — SULFAMETHOXAZOLE AND TRIMETHOPRIM 1 TABLET: 800; 160 TABLET ORAL at 20:32

## 2024-11-27 RX ADMIN — SULFAMETHOXAZOLE AND TRIMETHOPRIM 1 TABLET: 800; 160 TABLET ORAL at 08:58

## 2024-11-27 ASSESSMENT — PAIN DESCRIPTION - DESCRIPTORS
DESCRIPTORS: ACHING
DESCRIPTORS: SHARP
DESCRIPTORS: ACHING;DISCOMFORT
DESCRIPTORS: SHARP
DESCRIPTORS: SHARP

## 2024-11-27 ASSESSMENT — PAIN - FUNCTIONAL ASSESSMENT
PAIN_FUNCTIONAL_ASSESSMENT: ACTIVITIES ARE NOT PREVENTED
PAIN_FUNCTIONAL_ASSESSMENT: ACTIVITIES ARE NOT PREVENTED
PAIN_FUNCTIONAL_ASSESSMENT: PREVENTS OR INTERFERES SOME ACTIVE ACTIVITIES AND ADLS
PAIN_FUNCTIONAL_ASSESSMENT: ACTIVITIES ARE NOT PREVENTED
PAIN_FUNCTIONAL_ASSESSMENT: PREVENTS OR INTERFERES SOME ACTIVE ACTIVITIES AND ADLS

## 2024-11-27 ASSESSMENT — PAIN DESCRIPTION - LOCATION
LOCATION: NECK;SHOULDER
LOCATION: NECK;SHOULDER
LOCATION: GENERALIZED
LOCATION: NECK
LOCATION: GENERALIZED
LOCATION: GENERALIZED
LOCATION: NECK;SHOULDER

## 2024-11-27 ASSESSMENT — PAIN SCALES - GENERAL
PAINLEVEL_OUTOF10: 10
PAINLEVEL_OUTOF10: 7
PAINLEVEL_OUTOF10: 10

## 2024-11-27 ASSESSMENT — PAIN DESCRIPTION - ORIENTATION
ORIENTATION: RIGHT;LEFT
ORIENTATION: LEFT;RIGHT
ORIENTATION: RIGHT;LEFT
ORIENTATION: RIGHT;LEFT

## 2024-11-27 ASSESSMENT — PAIN DESCRIPTION - PAIN TYPE: TYPE: ACUTE PAIN

## 2024-11-27 ASSESSMENT — PAIN SCALES - WONG BAKER: WONGBAKER_NUMERICALRESPONSE: NO HURT

## 2024-11-27 ASSESSMENT — PAIN DESCRIPTION - FREQUENCY: FREQUENCY: CONTINUOUS

## 2024-11-27 NOTE — CARE COORDINATION
Follow up visit made with patient at bedside   Reviewed PT eval notations and level of assistance required.  Communicated concerns for patient safety associated with a return to home.  Patient verbalized absolute opposition to a snf/deon discharge plan stating she has been at [place] 6 times and she feels there was no benefit.  Offered patient home health services - patient again declined stating her desire to continue with out patient therapy at [facility] every Wednesday and Friday.  Patient does state she has home health aide service 8a-10A and again 2p-6p 5-6 days a week.  She has support from her neighbor and other family members. Patient adds her neighbor or her family member will transport her home via private car.  She has lift chair and walker at home.  Patient will need followed and assisted with discharge planning as necessary.   JOSSELIN Santos RN  [facility] Case Management  [phone]    After discussion with primary care, patient has modified position on acceptance of HHC services.  Choices are offered and patient selected [facility].  Referral has been sent via EPIC (await response), orders for HHC are in place.  JOSSELIN Santos RN  [facility] Case Management  [phone]

## 2024-11-27 NOTE — PROGRESS NOTES
[facility] Hospitalist Progress Note    Admitting Date and Time: 11/24/2024  9:58 AM  Admit Dx: Neck pain [M54.2]  Atrial fibrillation with RVR (HCC) [I48.91]  Acute pain of left shoulder [M25.512]  Atrial fibrillation, unspecified type (HCC) [I48.91]  Atrial fibrillation with rapid ventricular response (HCC) [I48.91]    Subjective:  Patient is being followed for Neck pain [M54.2]  Atrial fibrillation with RVR (HCC) [I48.91]  Acute pain of left shoulder [M25.512]  Atrial fibrillation, unspecified type (HCC) [I48.91]  Atrial fibrillation with rapid ventricular response (HCC) [I48.91]   Pt was seen and examined today. Denies any new issues. Admits to hitting her right foot on ramp with power wheelchair.  States her next a little less stiff today, now able to raise both arms, she is working on her range of motion often    ROS: denies fever, chills, cp, sob, n/v, HA unless stated above.     Unna-Flex Elastic Unna Boot  1 each Topical Once    metoprolol succinate  50 mg Oral Daily    lidocaine  1 patch TransDERmal Daily    sulfamethoxazole-trimethoprim  1 tablet Oral BID    miconazole   Topical BID    allopurinol  100 mg Oral Daily    aspirin  81 mg Oral Daily    atorvastatin  20 mg Oral Daily    apixaban  5 mg Oral BID    levothyroxine  150 mcg Oral Daily    sodium chloride flush  5-40 mL IntraVENous 2 times per day     tiZANidine, 4 mg, Q6H PRN  oxyCODONE, 5 mg, Q4H PRN   Or  oxyCODONE, 10 mg, Q4H PRN  HYDROmorphone, 0.25 mg, Q3H PRN   Or  HYDROmorphone, 0.5 mg, Q3H PRN  sodium chloride flush, 5-40 mL, PRN  sodium chloride, , PRN  potassium chloride, 40 mEq, PRN   Or  potassium alternative oral replacement, 40 mEq, PRN  magnesium sulfate, 2,000 mg, PRN  ondansetron, 4 mg, Q8H PRN   Or  ondansetron, 4 mg, Q6H PRN  melatonin, 3 mg, Nightly PRN  senna, 1 tablet, Daily PRN  aluminum & magnesium hydroxide-simethicone, 30 mL, Q6H PRN  acetaminophen, 650 mg, Q6H PRN   Or  acetaminophen, 650 mg, Q6H PRN  polyethylene

## 2024-11-27 NOTE — PLAN OF CARE
Problem: Chronic Conditions and Co-morbidities  Goal: Patient's chronic conditions and co-morbidity symptoms are monitored and maintained or improved  11/27/2024 1050 by Elizabeth Coe RN  Outcome: Progressing  11/26/2024 2200 by Mckenna Turk RN  Outcome: Progressing     Problem: Discharge Planning  Goal: Discharge to home or other facility with appropriate resources  11/27/2024 1050 by Elizabeth Coe RN  Outcome: Progressing  11/26/2024 2200 by Mckenna Turk RN  Outcome: Progressing     Problem: Pain  Goal: Verbalizes/displays adequate comfort level or baseline comfort level  11/27/2024 1050 by Elizabeth Coe RN  Outcome: Progressing  11/26/2024 2200 by Mckenna Turk RN  Outcome: Progressing     Problem: Skin/Tissue Integrity  Goal: Absence of new skin breakdown  Description: 1.  Monitor for areas of redness and/or skin breakdown  2.  Assess vascular access sites hourly  3.  Every 4-6 hours minimum:  Change oxygen saturation probe site  4.  Every 4-6 hours:  If on nasal continuous positive airway pressure, respiratory therapy assess nares and determine need for appliance change or resting period.  Outcome: Progressing     Problem: Safety - Adult  Goal: Free from fall injury  Outcome: Progressing     Problem: Musculoskeletal - Adult  Goal: Return mobility to safest level of function  Outcome: Progressing  Goal: Maintain proper alignment of affected body part  Outcome: Progressing     Problem: Infection - Adult  Goal: Absence of infection at discharge  Outcome: Progressing

## 2024-11-27 NOTE — CONSULTS
Department of Podiatry   Consult Note        Reason for Consult:  recent foot trauma    CHIEF COMPLAINT:  recent foot trauma    HISTORY OF PRESENT ILLNESS:                The patient is a 74 y.o. female with significant past medical history of diabetes mellitus, HLD, HTN. Patient presented to the hospital for neck and back pain, however, has bruising noted to right foot and states she recently hurt her foot getting into the van for transport. Patient states that she broke her toenail at the same time and noticed a lot of bleeding due to being on blood thinners. Patient does states that the foot is still hurting. I reviewed the xrays with the patient, and there appears to be no fracture or dislocation. I did discuss with her that she is very swollen and could benefit from compression. Patient states that she usually wears some type of compression sock. Patient denies any N/V/D/F/C/SOB/CP and has no other pedal complaints at this time.     Past Medical History:        Diagnosis Date    Arthritis     Cerebral artery occlusion with cerebral infarction (HCC) 1998?    tia     Cigarette nicotine dependence without complication 1/12/2018    COPD (chronic obstructive pulmonary disease) (HCC)     Depression 10/2014    \"doing ok\".  anxiety    Diabetes mellitus (HCC)     prediabetic    GERD (gastroesophageal reflux disease)     Hyperlipidemia     Hypertension     Hypothyroidism     Obese 10/2014    states weight aprox. 400 lbs.    SHALA (obstructive sleep apnea)     no treatment    Palate mass        Past Surgical History:        Procedure Laterality Date    APPENDECTOMY      CARPAL TUNNEL RELEASE Bilateral     17 years ago    COLONOSCOPY      ENDOSCOPY, COLON, DIAGNOSTIC      FOOT SURGERY Right 2009    FOOT SURGERY  2014    reconstructive for hammer toes all four, not big toe left foot    HAMMER TOE SURGERY Left 03/06/2017    HYSTERECTOMY (CERVIX STATUS UNKNOWN)      JOINT REPLACEMENT  bilat knee    OTHER SURGICAL HISTORY Left  daily)    Allergies:  Food, Iodinated contrast media, Iodine, Acetaminophen, Crab extract, Darvocet [propoxyphene n-acetaminophen], Influenza vaccines, Lamictal [lamotrigine], Mirtazapine, Other, Pentazocine lactate, Pneumococcal vaccines, Prednisone, Propoxycaine, Propoxyphene, Cephalexin, and Talwin [pentazocine]    Social History:   TOBACCO:   reports that she has been smoking cigarettes and cigars. She has never used smokeless tobacco.  ETOH:   reports no history of alcohol use.  DRUGS:   Social History     Substance and Sexual Activity   Drug Use No       Family History:       Problem Relation Age of Onset    Heart Disease Mother     Cancer Father     Breast Cancer Sister     Lung Cancer Brother     Lung Cancer Brother     Cancer Son 6        Non Hodgkins Lymphoma    Lung Cancer Brother          REVIEW OF SYSTEMS:    CONSTITUTIONAL:  negative      LOWER EXTREMITY EXAMINATION     VASCULAR:  DP and PT pulses are nonpalpable. CFT = 5 seconds B/L.  Warm to cold from tibial tuberosity to digits on the left, warm to cool from tibial tuberosity to digits on the right. Edema noted to bilateral lower extremities.    NEUROLOGIC:  Protective sensation is diminished by grossly intact    DERM:  Skin is intact and well hydrated to the bilateral lower extremities.    Right: mild ecchymosis noted to digits 1-4 on the right. Webspaces are clean, dry, and intact. No open wounds. Hallux is erythematous and warm to touch.    Left: Small scab noted to midfoot without signs of infection. No erythema noted to foot. Webspaces are clean, dry, and intact.     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 Flexion contracture of left halllux.  3/5 Gross Muscle strength in all 4 quadrants. Pain on palpation of right digits 1-4.                 CONSULTS:  IP CONSULT TO INTERNAL MEDICINE  IP CONSULT TO PODIATRY    MEDICATION:  Scheduled Meds:   metoprolol succinate  50 mg Oral Daily    lidocaine  1 patch TransDERmal Daily

## 2024-11-28 PROBLEM — N39.0 URINARY TRACT INFECTION WITHOUT HEMATURIA: Status: ACTIVE | Noted: 2024-11-28

## 2024-11-28 PROBLEM — R31.9 URINARY TRACT INFECTION WITH HEMATURIA: Status: ACTIVE | Noted: 2024-11-28

## 2024-11-28 PROBLEM — M54.2 NECK PAIN: Status: ACTIVE | Noted: 2024-11-28

## 2024-11-28 PROBLEM — N39.0 URINARY TRACT INFECTION WITH HEMATURIA: Status: ACTIVE | Noted: 2024-11-28

## 2024-11-28 PROBLEM — I48.91 ATRIAL FIBRILLATION (HCC): Status: ACTIVE | Noted: 2024-11-28

## 2024-11-28 LAB
ANION GAP SERPL CALCULATED.3IONS-SCNC: 9 MMOL/L (ref 7–16)
BASOPHILS # BLD: 0.03 K/UL (ref 0–0.2)
BASOPHILS NFR BLD: 0 % (ref 0–2)
BUN SERPL-MCNC: 19 MG/DL (ref 6–23)
CALCIUM SERPL-MCNC: 9.3 MG/DL (ref 8.6–10.2)
CHLORIDE SERPL-SCNC: 100 MMOL/L (ref 98–107)
CO2 SERPL-SCNC: 26 MMOL/L (ref 22–29)
CREAT SERPL-MCNC: 1.3 MG/DL (ref 0.5–1)
EOSINOPHIL # BLD: 0.38 K/UL (ref 0.05–0.5)
EOSINOPHILS RELATIVE PERCENT: 5 % (ref 0–6)
ERYTHROCYTE [DISTWIDTH] IN BLOOD BY AUTOMATED COUNT: 13.1 % (ref 11.5–15)
GFR, ESTIMATED: 44 ML/MIN/1.73M2
GLUCOSE SERPL-MCNC: 81 MG/DL (ref 74–99)
HCT VFR BLD AUTO: 40.2 % (ref 34–48)
HGB BLD-MCNC: 12.6 G/DL (ref 11.5–15.5)
IMM GRANULOCYTES # BLD AUTO: 0.04 K/UL (ref 0–0.58)
IMM GRANULOCYTES NFR BLD: 1 % (ref 0–5)
LYMPHOCYTES NFR BLD: 1.58 K/UL (ref 1.5–4)
LYMPHOCYTES RELATIVE PERCENT: 21 % (ref 20–42)
MCH RBC QN AUTO: 29.6 PG (ref 26–35)
MCHC RBC AUTO-ENTMCNC: 31.3 G/DL (ref 32–34.5)
MCV RBC AUTO: 94.6 FL (ref 80–99.9)
MONOCYTES NFR BLD: 0.6 K/UL (ref 0.1–0.95)
MONOCYTES NFR BLD: 8 % (ref 2–12)
NEUTROPHILS NFR BLD: 65 % (ref 43–80)
NEUTS SEG NFR BLD: 4.86 K/UL (ref 1.8–7.3)
PLATELET # BLD AUTO: 219 K/UL (ref 130–450)
PMV BLD AUTO: 9.8 FL (ref 7–12)
POTASSIUM SERPL-SCNC: 5 MMOL/L (ref 3.5–5)
RBC # BLD AUTO: 4.25 M/UL (ref 3.5–5.5)
SODIUM SERPL-SCNC: 135 MMOL/L (ref 132–146)
WBC OTHER # BLD: 7.5 K/UL (ref 4.5–11.5)

## 2024-11-28 PROCEDURE — 6370000000 HC RX 637 (ALT 250 FOR IP): Performed by: STUDENT IN AN ORGANIZED HEALTH CARE EDUCATION/TRAINING PROGRAM

## 2024-11-28 PROCEDURE — 36415 COLL VENOUS BLD VENIPUNCTURE: CPT

## 2024-11-28 PROCEDURE — 80048 BASIC METABOLIC PNL TOTAL CA: CPT

## 2024-11-28 PROCEDURE — 85025 COMPLETE CBC W/AUTO DIFF WBC: CPT

## 2024-11-28 PROCEDURE — 6370000000 HC RX 637 (ALT 250 FOR IP): Performed by: HOSPITALIST

## 2024-11-28 PROCEDURE — 99232 SBSQ HOSP IP/OBS MODERATE 35: CPT | Performed by: INTERNAL MEDICINE

## 2024-11-28 PROCEDURE — 1200000000 HC SEMI PRIVATE

## 2024-11-28 PROCEDURE — 2580000003 HC RX 258: Performed by: HOSPITALIST

## 2024-11-28 RX ADMIN — MICONAZOLE NITRATE: 20 POWDER TOPICAL at 20:05

## 2024-11-28 RX ADMIN — TIZANIDINE 4 MG: 4 TABLET ORAL at 20:23

## 2024-11-28 RX ADMIN — APIXABAN 5 MG: 5 TABLET, FILM COATED ORAL at 09:36

## 2024-11-28 RX ADMIN — ASPIRIN 81 MG: 81 TABLET, COATED ORAL at 09:35

## 2024-11-28 RX ADMIN — METOPROLOL SUCCINATE 50 MG: 50 TABLET, EXTENDED RELEASE ORAL at 09:36

## 2024-11-28 RX ADMIN — SODIUM CHLORIDE, PRESERVATIVE FREE 10 ML: 5 INJECTION INTRAVENOUS at 20:05

## 2024-11-28 RX ADMIN — TIZANIDINE 4 MG: 4 TABLET ORAL at 05:47

## 2024-11-28 RX ADMIN — SULFAMETHOXAZOLE AND TRIMETHOPRIM 1 TABLET: 800; 160 TABLET ORAL at 20:03

## 2024-11-28 RX ADMIN — OXYCODONE 10 MG: 5 TABLET ORAL at 15:30

## 2024-11-28 RX ADMIN — SODIUM CHLORIDE, PRESERVATIVE FREE 10 ML: 5 INJECTION INTRAVENOUS at 09:37

## 2024-11-28 RX ADMIN — ATORVASTATIN CALCIUM 20 MG: 20 TABLET, FILM COATED ORAL at 09:36

## 2024-11-28 RX ADMIN — SULFAMETHOXAZOLE AND TRIMETHOPRIM 1 TABLET: 800; 160 TABLET ORAL at 09:35

## 2024-11-28 RX ADMIN — MICONAZOLE NITRATE: 20 POWDER TOPICAL at 09:36

## 2024-11-28 RX ADMIN — LEVOTHYROXINE SODIUM 150 MCG: 75 TABLET ORAL at 05:48

## 2024-11-28 RX ADMIN — OXYCODONE 10 MG: 5 TABLET ORAL at 05:47

## 2024-11-28 RX ADMIN — ALLOPURINOL 100 MG: 100 TABLET ORAL at 09:36

## 2024-11-28 RX ADMIN — APIXABAN 5 MG: 5 TABLET, FILM COATED ORAL at 20:03

## 2024-11-28 ASSESSMENT — PAIN SCALES - GENERAL
PAINLEVEL_OUTOF10: 10
PAINLEVEL_OUTOF10: 10
PAINLEVEL_OUTOF10: 0
PAINLEVEL_OUTOF10: 3
PAINLEVEL_OUTOF10: 0

## 2024-11-28 ASSESSMENT — PAIN DESCRIPTION - DESCRIPTORS
DESCRIPTORS: SHARP
DESCRIPTORS: ACHING;DISCOMFORT

## 2024-11-28 ASSESSMENT — PAIN DESCRIPTION - LOCATION
LOCATION: NECK;SHOULDER
LOCATION: NECK;SHOULDER

## 2024-11-28 NOTE — PLAN OF CARE
Problem: Chronic Conditions and Co-morbidities  Goal: Patient's chronic conditions and co-morbidity symptoms are monitored and maintained or improved  11/27/2024 2118 by Marsha Thakur RN  Outcome: Progressing  11/27/2024 1050 by Elizabeth Coe RN  Outcome: Progressing     Problem: Discharge Planning  Goal: Discharge to home or other facility with appropriate resources  11/27/2024 2118 by Marsha Thakur RN  Outcome: Progressing  11/27/2024 1050 by Elizabeth Coe RN  Outcome: Progressing     Problem: Skin/Tissue Integrity  Goal: Absence of new skin breakdown  Description: 1.  Monitor for areas of redness and/or skin breakdown  2.  Assess vascular access sites hourly  3.  Every 4-6 hours minimum:  Change oxygen saturation probe site  4.  Every 4-6 hours:  If on nasal continuous positive airway pressure, respiratory therapy assess nares and determine need for appliance change or resting period.  11/27/2024 2118 by Marsha Thakur RN  Outcome: Progressing  11/27/2024 1050 by Elizabeth Coe RN  Outcome: Progressing     Problem: Safety - Adult  Goal: Free from fall injury  11/27/2024 2118 by Marsha Thakur RN  Outcome: Progressing  11/27/2024 1050 by Elizabeth Coe RN  Outcome: Progressing     Problem: Musculoskeletal - Adult  Goal: Return mobility to safest level of function  11/27/2024 2118 by Marsha Thakur RN  Outcome: Progressing  11/27/2024 1050 by Elizabeth Coe RN  Outcome: Progressing     Problem: Musculoskeletal - Adult  Goal: Maintain proper alignment of affected body part  11/27/2024 2118 by Marsha Thakur RN  Outcome: Progressing  11/27/2024 1050 by Elizabeth Coe RN  Outcome: Progressing     Problem: Infection - Adult  Goal: Absence of infection at discharge  11/27/2024 2118 by Marsha Thakur RN  Outcome: Progressing  11/27/2024 1050 by Elizabeth Coe RN  Outcome: Progressing     Problem: Pain  Goal: Verbalizes/displays adequate comfort level or baseline comfort level  11/27/2024 2118 by Marsha Thakur RN  Outcome: Not

## 2024-11-29 PROBLEM — M25.519 SHOULDER PAIN: Status: ACTIVE | Noted: 2024-11-29

## 2024-11-29 LAB
ANION GAP SERPL CALCULATED.3IONS-SCNC: 7 MMOL/L (ref 7–16)
BASOPHILS # BLD: 0.04 K/UL (ref 0–0.2)
BASOPHILS NFR BLD: 1 % (ref 0–2)
BUN SERPL-MCNC: 19 MG/DL (ref 6–23)
CALCIUM SERPL-MCNC: 9.6 MG/DL (ref 8.6–10.2)
CHLORIDE SERPL-SCNC: 101 MMOL/L (ref 98–107)
CO2 SERPL-SCNC: 28 MMOL/L (ref 22–29)
CREAT SERPL-MCNC: 1.3 MG/DL (ref 0.5–1)
EOSINOPHIL # BLD: 0.34 K/UL (ref 0.05–0.5)
EOSINOPHILS RELATIVE PERCENT: 5 % (ref 0–6)
ERYTHROCYTE [DISTWIDTH] IN BLOOD BY AUTOMATED COUNT: 12.9 % (ref 11.5–15)
GFR, ESTIMATED: 44 ML/MIN/1.73M2
GLUCOSE SERPL-MCNC: 98 MG/DL (ref 74–99)
HCT VFR BLD AUTO: 40.6 % (ref 34–48)
HGB BLD-MCNC: 12.6 G/DL (ref 11.5–15.5)
IMM GRANULOCYTES # BLD AUTO: 0.03 K/UL (ref 0–0.58)
IMM GRANULOCYTES NFR BLD: 0 % (ref 0–5)
LYMPHOCYTES NFR BLD: 1.72 K/UL (ref 1.5–4)
LYMPHOCYTES RELATIVE PERCENT: 25 % (ref 20–42)
MCH RBC QN AUTO: 29.2 PG (ref 26–35)
MCHC RBC AUTO-ENTMCNC: 31 G/DL (ref 32–34.5)
MCV RBC AUTO: 94 FL (ref 80–99.9)
MONOCYTES NFR BLD: 0.61 K/UL (ref 0.1–0.95)
MONOCYTES NFR BLD: 9 % (ref 2–12)
NEUTROPHILS NFR BLD: 60 % (ref 43–80)
NEUTS SEG NFR BLD: 4.07 K/UL (ref 1.8–7.3)
PLATELET # BLD AUTO: 213 K/UL (ref 130–450)
PMV BLD AUTO: 10 FL (ref 7–12)
POTASSIUM SERPL-SCNC: 4.9 MMOL/L (ref 3.5–5)
RBC # BLD AUTO: 4.32 M/UL (ref 3.5–5.5)
SODIUM SERPL-SCNC: 136 MMOL/L (ref 132–146)
WBC OTHER # BLD: 6.8 K/UL (ref 4.5–11.5)

## 2024-11-29 PROCEDURE — 97530 THERAPEUTIC ACTIVITIES: CPT

## 2024-11-29 PROCEDURE — 1200000000 HC SEMI PRIVATE

## 2024-11-29 PROCEDURE — 97165 OT EVAL LOW COMPLEX 30 MIN: CPT

## 2024-11-29 PROCEDURE — 80048 BASIC METABOLIC PNL TOTAL CA: CPT

## 2024-11-29 PROCEDURE — 6370000000 HC RX 637 (ALT 250 FOR IP): Performed by: STUDENT IN AN ORGANIZED HEALTH CARE EDUCATION/TRAINING PROGRAM

## 2024-11-29 PROCEDURE — 2580000003 HC RX 258: Performed by: HOSPITALIST

## 2024-11-29 PROCEDURE — 85025 COMPLETE CBC W/AUTO DIFF WBC: CPT

## 2024-11-29 PROCEDURE — 6370000000 HC RX 637 (ALT 250 FOR IP): Performed by: HOSPITALIST

## 2024-11-29 PROCEDURE — 36415 COLL VENOUS BLD VENIPUNCTURE: CPT

## 2024-11-29 PROCEDURE — 97535 SELF CARE MNGMENT TRAINING: CPT

## 2024-11-29 PROCEDURE — 99232 SBSQ HOSP IP/OBS MODERATE 35: CPT | Performed by: INTERNAL MEDICINE

## 2024-11-29 RX ORDER — METOPROLOL SUCCINATE 50 MG/1
50 TABLET, EXTENDED RELEASE ORAL DAILY
Qty: 30 TABLET | Refills: 3 | Status: SHIPPED | OUTPATIENT
Start: 2024-11-30

## 2024-11-29 RX ORDER — OXYCODONE HYDROCHLORIDE 5 MG/1
5 TABLET ORAL EVERY 6 HOURS PRN
Qty: 20 TABLET | Refills: 0 | Status: SHIPPED | OUTPATIENT
Start: 2024-11-29 | End: 2024-12-04

## 2024-11-29 RX ADMIN — ALLOPURINOL 100 MG: 100 TABLET ORAL at 08:01

## 2024-11-29 RX ADMIN — MICONAZOLE NITRATE: 20 POWDER TOPICAL at 08:11

## 2024-11-29 RX ADMIN — APIXABAN 5 MG: 5 TABLET, FILM COATED ORAL at 08:01

## 2024-11-29 RX ADMIN — SULFAMETHOXAZOLE AND TRIMETHOPRIM 1 TABLET: 800; 160 TABLET ORAL at 08:01

## 2024-11-29 RX ADMIN — APIXABAN 5 MG: 5 TABLET, FILM COATED ORAL at 21:18

## 2024-11-29 RX ADMIN — SODIUM CHLORIDE, PRESERVATIVE FREE 10 ML: 5 INJECTION INTRAVENOUS at 21:18

## 2024-11-29 RX ADMIN — ASPIRIN 81 MG: 81 TABLET, COATED ORAL at 08:01

## 2024-11-29 RX ADMIN — OXYCODONE 10 MG: 5 TABLET ORAL at 02:18

## 2024-11-29 RX ADMIN — MICONAZOLE NITRATE: 20 POWDER TOPICAL at 21:18

## 2024-11-29 RX ADMIN — METOPROLOL SUCCINATE 50 MG: 50 TABLET, EXTENDED RELEASE ORAL at 08:01

## 2024-11-29 RX ADMIN — LEVOTHYROXINE SODIUM 150 MCG: 75 TABLET ORAL at 05:16

## 2024-11-29 RX ADMIN — ATORVASTATIN CALCIUM 20 MG: 20 TABLET, FILM COATED ORAL at 08:00

## 2024-11-29 RX ADMIN — OXYCODONE 10 MG: 5 TABLET ORAL at 19:12

## 2024-11-29 RX ADMIN — SODIUM CHLORIDE, PRESERVATIVE FREE 10 ML: 5 INJECTION INTRAVENOUS at 08:01

## 2024-11-29 RX ADMIN — OXYCODONE 10 MG: 5 TABLET ORAL at 08:06

## 2024-11-29 RX ADMIN — TIZANIDINE 4 MG: 4 TABLET ORAL at 02:18

## 2024-11-29 RX ADMIN — TIZANIDINE 4 MG: 4 TABLET ORAL at 19:13

## 2024-11-29 ASSESSMENT — PAIN DESCRIPTION - DESCRIPTORS
DESCRIPTORS: ACHING;SHARP
DESCRIPTORS: DISCOMFORT
DESCRIPTORS: ACHING;THROBBING;SHARP

## 2024-11-29 ASSESSMENT — PAIN DESCRIPTION - LOCATION
LOCATION: NECK
LOCATION: NECK;SHOULDER
LOCATION: NECK;SHOULDER

## 2024-11-29 ASSESSMENT — PAIN SCALES - GENERAL
PAINLEVEL_OUTOF10: 8
PAINLEVEL_OUTOF10: 10
PAINLEVEL_OUTOF10: 8
PAINLEVEL_OUTOF10: 10

## 2024-11-29 ASSESSMENT — PAIN DESCRIPTION - ORIENTATION
ORIENTATION: LEFT;RIGHT
ORIENTATION: RIGHT;LEFT

## 2024-11-29 NOTE — PROGRESS NOTES
Department of Podiatry  Progress Note    SUBJECTIVE:  Olimpia Fish is seen at bedside for recent right foot trauma. Patient states that she has been having pain in her neck since yesterday but has had no lower extremity pain.  Patient states that Unna boots have been comfortable.  No acute events overnight. Patient denies any N/V/D/F/C/SOB/CP. No other pedal complaints at this time.      OBJECTIVE:    Scheduled Meds:   metoprolol succinate  50 mg Oral Daily    lidocaine  1 patch TransDERmal Daily    miconazole   Topical BID    allopurinol  100 mg Oral Daily    aspirin  81 mg Oral Daily    atorvastatin  20 mg Oral Daily    apixaban  5 mg Oral BID    levothyroxine  150 mcg Oral Daily    sodium chloride flush  5-40 mL IntraVENous 2 times per day     Continuous Infusions:   sodium chloride       PRN Meds:.tiZANidine, oxyCODONE **OR** oxyCODONE, HYDROmorphone **OR** HYDROmorphone, sodium chloride flush, sodium chloride, potassium chloride **OR** potassium alternative oral replacement **OR** [DISCONTINUED] potassium chloride, magnesium sulfate, ondansetron **OR** ondansetron, melatonin, senna, aluminum & magnesium hydroxide-simethicone, acetaminophen **OR** acetaminophen, polyethylene glycol    Allergies   Allergen Reactions    Food Shortness Of Breath, Swelling and Rash     Fresh peaches, lobster, crab legs.    Iodinated Contrast Media Shortness Of Breath and Other (See Comments)    Iodine Hives and Shortness Of Breath    Acetaminophen     Crab Extract     Darvocet [Propoxyphene N-Acetaminophen] Hives    Influenza Vaccines Hives    Lamictal [Lamotrigine]     Mirtazapine Other (See Comments)    Other Hives     Pneumonia vaccine.  \"sheets\" , detergent.  Black Pepper          Pentazocine Lactate Hives    Pneumococcal Vaccines     Prednisone      Other Reaction(s): boils/rash    Propoxycaine Other (See Comments)    Propoxyphene Hives and Other (See Comments)    Cephalexin Rash    Talwin [Pentazocine] Nausea And Vomiting

## 2024-11-29 NOTE — PROGRESS NOTES
CLINICAL PHARMACY NOTE: MEDS TO BEDS    Total # of Prescriptions Filled: 1   The following medications were delivered to the patient:  Oxycodone 5 mg     Additional Documentation:

## 2024-11-29 NOTE — PROGRESS NOTES
activities. Upon arrival, patient supine in bed. At end of session, patient seated in bedside chair with call light and phone within reach, staff member present, and all lines and tubes intact. Patient would benefit from continued skilled OT to increase safety and independence with completion of ADL/IADL tasks for functional independence and quality of life.     Treatment: OT treatment provided this date included:   Instruction/training on safety and adapted techniques for completion of ADLs - including potential benefits of DME to maximize safety.    Instruction/training on safe functional mobility/transfer techniques.   Instruction/training on energy conservation/work simplification for completion of ADLs/IADLs    Patient education provided regardin) potential benefits of DME use to maximize independence/safety with ADLs, 2) importance of having staff assist with OOB activities. Patient indicated understanding.    Further skilled OT treatment indicated to increase patient's safety and independence with completion of ADL/IADL tasks in order to maximize patient's functional independence and quality of life.    Rehab Potential: Good for established goals.  Patient / Family Goal: Patient wants to return home.  Patient and/or family were instructed on functional diagnosis, prognosis/goals, and OT plan of care. Demonstrated Good understanding.    Eval Complexity: Low    Time In: 1320  Time Out: 1343  Total Treatment Time: 8 minutes      Minutes Units   OT Eval Low 97233 15 1   OT Eval Medium 18106     OT Eval High 34538     OT Re-Eval 18558     Therapeutic Ex 17207     Therapeutic Activities 44519     ADL/Self Care 94163 8 1   Orthotic Management 36907     Neuro Re-Ed 57773     Non-Billable Time N/A ---     Evaluation time includes thorough review of current medical information, gathering information on past medical history/social history and prior level of function, completion of standardized testing/informal  observation of tasks, assessment of data, and education on plan of care and goals.    Kaykay Garvey, OTR/L  License Number: [id number]

## 2024-11-29 NOTE — CARE COORDINATION
Met with patient and primary care.  Plan will be for discharge to home 11/30/2024  Patient has made arrangements with her neighbor for transportation and support once home.  [facility] has communicated intention to commence services 12/3/2024.  Preet Koroma, MSN RN  [facility] Case Management  [phone]

## 2024-11-29 NOTE — PROGRESS NOTES
CLINICAL PHARMACY NOTE: MEDS TO BEDS    Total # of Prescriptions Filled: 1   The following medications were delivered to the patient:  Metoprolol er succ 50mg    Additional Documentation:  To pt

## 2024-11-29 NOTE — PROGRESS NOTES
Department of Podiatry  Progress Note    SUBJECTIVE:  Olimpia Fish is seen at bedside for recent right foot trauma.  Patient states that she injured her foot while getting into a van for transport and that this has happened multiple times before.  Patient states she feels about the same as yesterday and has had no changes as far as pain is concerned.  Patient states that she has had Unna boots applied in the past for compression of the bilateral lower extremities and she thinks this has been effective.  No acute events overnight. Patient denies any N/V/D/F/C/SOB/CP. No other pedal complaints at this time.     OBJECTIVE:    Scheduled Meds:   metoprolol succinate  50 mg Oral Daily    lidocaine  1 patch TransDERmal Daily    sulfamethoxazole-trimethoprim  1 tablet Oral BID    miconazole   Topical BID    allopurinol  100 mg Oral Daily    aspirin  81 mg Oral Daily    atorvastatin  20 mg Oral Daily    apixaban  5 mg Oral BID    levothyroxine  150 mcg Oral Daily    sodium chloride flush  5-40 mL IntraVENous 2 times per day     Continuous Infusions:   sodium chloride       PRN Meds:.tiZANidine, oxyCODONE **OR** oxyCODONE, HYDROmorphone **OR** HYDROmorphone, sodium chloride flush, sodium chloride, potassium chloride **OR** potassium alternative oral replacement **OR** [DISCONTINUED] potassium chloride, magnesium sulfate, ondansetron **OR** ondansetron, melatonin, senna, aluminum & magnesium hydroxide-simethicone, acetaminophen **OR** acetaminophen, polyethylene glycol    Allergies   Allergen Reactions    Food Shortness Of Breath, Swelling and Rash     Fresh peaches, lobster, crab legs.    Iodinated Contrast Media Shortness Of Breath and Other (See Comments)    Iodine Hives and Shortness Of Breath    Acetaminophen     Crab Extract     Darvocet [Propoxyphene N-Acetaminophen] Hives    Influenza Vaccines Hives    Lamictal [Lamotrigine]     Mirtazapine Other (See Comments)    Other Hives     Pneumonia vaccine.  \"sheets\" ,    1.  Normal ankle brachial indices bilaterally at 1.1.  There are multiphasic   waveform seen bilaterally at all levels on arterial Doppler evaluation.  This   finding lower suspicion for the presence of a high-grade stenosis/occlusion.      2.  Digital waveform evaluation of the remaining digits of the left foot show   non pulsatility in all digits.  Blood flow to the digits of the bilateral   feet appears to be significantly decreased.  Given the other findings on this   exam, this is most likely secondary to advanced underlying microvascular   disease.  An upstream stenosis/occlusion is felt to be less likely however is   not entirely excluded.         XR FOOT RIGHT (MIN 3 VIEWS)   Final Result   No acute osseous findings.      RECOMMENDATION:   Short-term follow-up radiographs in 7-10 days or cross-sectional imaging   (CT/MRI) if there is persistent concern for fracture or other traumatic   process or the symptoms persist.         CT CSpine W/O Contrast   Final Result   No acute abnormality of the cervical spine.           BP 96/64   Pulse 98   Temp 98.2 °F (36.8 °C) (Oral)   Resp 18   Ht 1.651 m (5' 5\")   Wt (!) 147.6 kg (325 lb 6.4 oz)   SpO2 95%   BMI 54.15 kg/m²     LABS:    Recent Labs     11/27/24  0811 11/28/24  1006   WBC 8.2 7.5   HGB 13.0 12.6   HCT 41.8 40.2    219        Recent Labs     11/28/24  1006      K 5.0      CO2 26   BUN 19   CREATININE 1.3*      No results for input(s): \"INR\", \"APTT\" in the last 72 hours.    Invalid input(s): \"PROT\"      ASSESSMENT:  - Recent right foot trauma  - Foot deformity, right  - PVD  - Diabetes mellitus  - Hypertension  - Obesity    PLAN:  - Patient was examined and evaluated. Labs, images and ancillary service notes reviewed.   - Pain Control: IV and PO  - Antibiotics per ID: Bactrim -appreciate recommendations  - Vascular: Lower extremity arterial segmental pressures performed 11/27/2024 had the following impression:   1.  Normal ankle

## 2024-11-29 NOTE — PROGRESS NOTES
[facility] Hospitalist   Progress Note    Admitting Date and Time: 11/24/2024  9:58 AM  Admit Dx: Neck pain [M54.2]  Atrial fibrillation with RVR (HCC) [I48.91]  Acute pain of left shoulder [M25.512]  Atrial fibrillation, unspecified type (HCC) [I48.91]  Atrial fibrillation with rapid ventricular response (HCC) [I48.91]    Subjective:    Patient was admitted with Neck pain [M54.2]  Atrial fibrillation with RVR (HCC) [I48.91]  Acute pain of left shoulder [M25.512]  Atrial fibrillation, unspecified type (HCC) [I48.91]  Atrial fibrillation with rapid ventricular response (HCC) [I48.91]. Patient denies fever, chills, cp, sob, n/v.     metoprolol succinate  50 mg Oral Daily    lidocaine  1 patch TransDERmal Daily    miconazole   Topical BID    allopurinol  100 mg Oral Daily    aspirin  81 mg Oral Daily    atorvastatin  20 mg Oral Daily    apixaban  5 mg Oral BID    levothyroxine  150 mcg Oral Daily    sodium chloride flush  5-40 mL IntraVENous 2 times per day     tiZANidine, 4 mg, Q6H PRN  oxyCODONE, 5 mg, Q4H PRN   Or  oxyCODONE, 10 mg, Q4H PRN  HYDROmorphone, 0.25 mg, Q3H PRN   Or  HYDROmorphone, 0.5 mg, Q3H PRN  sodium chloride flush, 5-40 mL, PRN  sodium chloride, , PRN  potassium chloride, 40 mEq, PRN   Or  potassium alternative oral replacement, 40 mEq, PRN  magnesium sulfate, 2,000 mg, PRN  ondansetron, 4 mg, Q8H PRN   Or  ondansetron, 4 mg, Q6H PRN  melatonin, 3 mg, Nightly PRN  senna, 1 tablet, Daily PRN  aluminum & magnesium hydroxide-simethicone, 30 mL, Q6H PRN  acetaminophen, 650 mg, Q6H PRN   Or  acetaminophen, 650 mg, Q6H PRN  polyethylene glycol, 17 g, Daily PRN         Objective:    /61   Pulse 84   Temp 98.2 °F (36.8 °C) (Oral)   Resp 18   Ht 1.651 m (5' 5\")   Wt (!) 147.6 kg (325 lb 6.4 oz)   SpO2 96%   BMI 54.15 kg/m²   Skin: warm and dry, no rash or erythema  Pulmonary/Chest: clear to auscultation bilaterally- no wheezes, rales or rhonchi, normal air movement, no

## 2024-11-29 NOTE — PROGRESS NOTES
Physical Therapy  Facility/Department: [address] INTERNAL MEDICINE 2  Daily Treatment Note  NAME: Olimpia Fish  : 1950  MRN: [id number]    Date of Service: 2024      Patient Diagnosis(es): The primary encounter diagnosis was Atrial fibrillation, unspecified type (HCC). Diagnoses of Neck pain, Acute pain of left shoulder, and Localized edema were also pertinent to this visit.       Referring provider:  Isaias Peña MD     PT Order:  PT eval and treat      Evaluating PT:  Chasidy Dorantes, PT, DPT PT [id number]     Room #:  0416/0416-A  Diagnosis:  Neck pain [M54.2]  Atrial fibrillation with RVR (HCC) [I48.91]  Acute pain of left shoulder [M25.512]  Atrial fibrillation, unspecified type (HCC) [I48.91]  Atrial fibrillation with rapid ventricular response (HCC) [I48.91]  Precautions:  fall risk  Equipment Needs:  none.  Pt reported owning a walker and power w/c.      SUBJECTIVE:     Pt lives alone in a 1 story home with elevator to enter.  Bed and bath is on first floor.  Pt ambulated with walker short distances PTA.  Pt reported she has an aide 5 and half days a week for a couple hours each day.       OBJECTIVE:    Initial Evaluation  Date:  Treatment  24 Short Term/ Long Term   Goals   Was pt agreeable to Eval/treatment? yes  yes     Does pt have pain? Neck and B shoulder pain  Right great toe.  B shoulder and neck pain     Bed Mobility  Rolling: Max A  Supine to sit: Max A (HOB elevated)  Sit to supine: Max A x 2  Scooting: Max A to sitting EOB Supine to sit SBA  Scooting SBA  SBA   Transfers Sit to stand: Mod A from the bed.  Max A from the BSC.  Stand to sit: Min A  Stand pivot: Min A with w/w  sit <> stand SBA SBA   Ambulation    4 side steps toward HOB With w/w Min A.    a few steps bed to chair with ww SBA 20+ feet with w/w SBA    Stair negotiation: ascended and descended  NT  NT      ROM BLE:  WFL       Strength BLE:  grossly 3+ to 4-/5   Increase LE Strength by 1/2 mm grade   Balance

## 2024-11-30 VITALS
WEIGHT: 293 LBS | OXYGEN SATURATION: 94 % | HEART RATE: 76 BPM | TEMPERATURE: 98.2 F | BODY MASS INDEX: 48.82 KG/M2 | DIASTOLIC BLOOD PRESSURE: 74 MMHG | HEIGHT: 65 IN | RESPIRATION RATE: 18 BRPM | SYSTOLIC BLOOD PRESSURE: 119 MMHG

## 2024-11-30 PROCEDURE — 2580000003 HC RX 258: Performed by: HOSPITALIST

## 2024-11-30 PROCEDURE — 99239 HOSP IP/OBS DSCHRG MGMT >30: CPT | Performed by: INTERNAL MEDICINE

## 2024-11-30 PROCEDURE — 6370000000 HC RX 637 (ALT 250 FOR IP): Performed by: STUDENT IN AN ORGANIZED HEALTH CARE EDUCATION/TRAINING PROGRAM

## 2024-11-30 PROCEDURE — 6370000000 HC RX 637 (ALT 250 FOR IP): Performed by: HOSPITALIST

## 2024-11-30 RX ADMIN — APIXABAN 5 MG: 5 TABLET, FILM COATED ORAL at 08:57

## 2024-11-30 RX ADMIN — LEVOTHYROXINE SODIUM 150 MCG: 75 TABLET ORAL at 05:15

## 2024-11-30 RX ADMIN — ASPIRIN 81 MG: 81 TABLET, COATED ORAL at 08:57

## 2024-11-30 RX ADMIN — MICONAZOLE NITRATE: 20 POWDER TOPICAL at 08:58

## 2024-11-30 RX ADMIN — OXYCODONE 10 MG: 5 TABLET ORAL at 04:29

## 2024-11-30 RX ADMIN — ALLOPURINOL 100 MG: 100 TABLET ORAL at 08:56

## 2024-11-30 RX ADMIN — METOPROLOL SUCCINATE 50 MG: 50 TABLET, EXTENDED RELEASE ORAL at 08:57

## 2024-11-30 RX ADMIN — OXYCODONE 5 MG: 5 TABLET ORAL at 08:56

## 2024-11-30 RX ADMIN — ATORVASTATIN CALCIUM 20 MG: 20 TABLET, FILM COATED ORAL at 08:56

## 2024-11-30 RX ADMIN — SODIUM CHLORIDE, PRESERVATIVE FREE 10 ML: 5 INJECTION INTRAVENOUS at 08:58

## 2024-11-30 ASSESSMENT — PAIN DESCRIPTION - ORIENTATION: ORIENTATION: RIGHT;LEFT

## 2024-11-30 ASSESSMENT — PAIN DESCRIPTION - LOCATION
LOCATION: NECK;SHOULDER
LOCATION: SHOULDER

## 2024-11-30 ASSESSMENT — PAIN DESCRIPTION - DESCRIPTORS
DESCRIPTORS: ACHING;SHARP;THROBBING
DESCRIPTORS: ACHING

## 2024-11-30 ASSESSMENT — PAIN SCALES - GENERAL
PAINLEVEL_OUTOF10: 10
PAINLEVEL_OUTOF10: 6

## 2024-11-30 ASSESSMENT — PAIN - FUNCTIONAL ASSESSMENT: PAIN_FUNCTIONAL_ASSESSMENT: PREVENTS OR INTERFERES SOME ACTIVE ACTIVITIES AND ADLS

## 2024-11-30 NOTE — PLAN OF CARE
Problem: Chronic Conditions and Co-morbidities  Goal: Patient's chronic conditions and co-morbidity symptoms are monitored and maintained or improved  Outcome: Progressing     Problem: Pain  Goal: Verbalizes/displays adequate comfort level or baseline comfort level  Outcome: Progressing     Problem: Skin/Tissue Integrity  Goal: Absence of new skin breakdown  Description: 1.  Monitor for areas of redness and/or skin breakdown  2.  Assess vascular access sites hourly  3.  Every 4-6 hours minimum:  Change oxygen saturation probe site  4.  Every 4-6 hours:  If on nasal continuous positive airway pressure, respiratory therapy assess nares and determine need for appliance change or resting period.  Outcome: Progressing     Problem: Safety - Adult  Goal: Free from fall injury  Outcome: Progressing     Problem: Musculoskeletal - Adult  Goal: Return mobility to safest level of function  Outcome: Progressing     Problem: Musculoskeletal - Adult  Goal: Maintain proper alignment of affected body part  Outcome: Progressing     Problem: Infection - Adult  Goal: Absence of infection at discharge  Outcome: Progressing     Problem: Cardiovascular - Adult  Goal: Maintains optimal cardiac output and hemodynamic stability  Outcome: Progressing     Problem: Skin/Tissue Integrity - Adult  Goal: Skin integrity remains intact  Outcome: Progressing

## 2024-11-30 NOTE — PLAN OF CARE
Problem: Chronic Conditions and Co-morbidities  Goal: Patient's chronic conditions and co-morbidity symptoms are monitored and maintained or improved  11/30/2024 1046 by Farhan Fuller RN  Outcome: Adequate for Discharge  11/30/2024 1045 by Farhan Fuller RN  Outcome: Progressing  11/29/2024 2227 by Tigre Joshi RN  Outcome: Progressing     Problem: Discharge Planning  Goal: Discharge to home or other facility with appropriate resources  11/30/2024 1046 by Farhan Fuller RN  Outcome: Adequate for Discharge  11/30/2024 1045 by Farhan Fuller RN  Outcome: Progressing  11/29/2024 2227 by Tigre Joshi RN  Outcome: Progressing     Problem: Pain  Goal: Verbalizes/displays adequate comfort level or baseline comfort level  11/30/2024 1046 by Farhan Fuller RN  Outcome: Adequate for Discharge  11/30/2024 1045 by Farhan Fuller RN  Outcome: Progressing  11/29/2024 2227 by Tigre Joshi RN  Outcome: Progressing     Problem: Skin/Tissue Integrity  Goal: Absence of new skin breakdown  Description: 1.  Monitor for areas of redness and/or skin breakdown  2.  Assess vascular access sites hourly  3.  Every 4-6 hours minimum:  Change oxygen saturation probe site  4.  Every 4-6 hours:  If on nasal continuous positive airway pressure, respiratory therapy assess nares and determine need for appliance change or resting period.  11/30/2024 1046 by Farhan Fulelr RN  Outcome: Adequate for Discharge  11/30/2024 1045 by Farhan Fuller RN  Outcome: Progressing  11/29/2024 2227 by Tigre Joshi RN  Outcome: Progressing     Problem: Safety - Adult  Goal: Free from fall injury  11/30/2024 1046 by Farhan Fuller RN  Outcome: Adequate for Discharge  11/30/2024 1045 by Farhan Fuller RN  Outcome: Progressing  11/29/2024 2227 by Tigre Joshi RN  Outcome: Progressing     Problem: Musculoskeletal - Adult  Goal: Return mobility to safest level of function  11/30/2024 1046 by Farhan Fuller RN  Outcome: Adequate for

## 2024-11-30 NOTE — PLAN OF CARE
Problem: Chronic Conditions and Co-morbidities  Goal: Patient's chronic conditions and co-morbidity symptoms are monitored and maintained or improved  11/30/2024 1045 by Farhan Fuller RN  Outcome: Progressing  11/29/2024 2227 by Tigre Joshi RN  Outcome: Progressing     Problem: Discharge Planning  Goal: Discharge to home or other facility with appropriate resources  11/30/2024 1045 by Farhan Fuller RN  Outcome: Progressing  11/29/2024 2227 by Tigre Joshi RN  Outcome: Progressing     Problem: Pain  Goal: Verbalizes/displays adequate comfort level or baseline comfort level  11/30/2024 1045 by Farhan Fuller RN  Outcome: Progressing  11/29/2024 2227 by Tigre Joshi RN  Outcome: Progressing     Problem: Skin/Tissue Integrity  Goal: Absence of new skin breakdown  Description: 1.  Monitor for areas of redness and/or skin breakdown  2.  Assess vascular access sites hourly  3.  Every 4-6 hours minimum:  Change oxygen saturation probe site  4.  Every 4-6 hours:  If on nasal continuous positive airway pressure, respiratory therapy assess nares and determine need for appliance change or resting period.  11/30/2024 1045 by Farhan Fuller RN  Outcome: Progressing  11/29/2024 2227 by Tigre Joshi RN  Outcome: Progressing     Problem: Safety - Adult  Goal: Free from fall injury  11/30/2024 1045 by Farhan Fuller RN  Outcome: Progressing  11/29/2024 2227 by Tigre Joshi RN  Outcome: Progressing     Problem: Musculoskeletal - Adult  Goal: Return mobility to safest level of function  11/30/2024 1045 by Farhan Fuller RN  Outcome: Progressing  11/29/2024 2227 by Tigre Joshi RN  Outcome: Progressing  Goal: Maintain proper alignment of affected body part  11/30/2024 1045 by Farhan Fuller RN  Outcome: Progressing  11/29/2024 2227 by Tigre Joshi RN  Outcome: Progressing     Problem: Infection - Adult  Goal: Absence of infection at discharge  11/30/2024 1045 by Farhan Fuller RN  Outcome:  Progressing  11/29/2024 2227 by Tigre Joshi RN  Outcome: Progressing     Problem: Cardiovascular - Adult  Goal: Maintains optimal cardiac output and hemodynamic stability  11/30/2024 1045 by Farhan Fuller RN  Outcome: Progressing  11/29/2024 2227 by Tigre Joshi RN  Outcome: Progressing     Problem: Skin/Tissue Integrity - Adult  Goal: Skin integrity remains intact  11/30/2024 1045 by Farhan Fuller RN  Outcome: Progressing  11/29/2024 2227 by Tigre Joshi RN  Outcome: Progressing

## 2024-11-30 NOTE — DISCHARGE SUMMARY
[facility] Hospitalist       Hospitalist Physician Discharge Summary       [facility]  [address] [place] [address]  [phone]    Start of care Tuesday 12/3      Activity level: as yaima    Diet: ADULT DIET; Regular    Dispo:home    Condition at discharge: fair          Patient ID:  Olimpia Fish  [id number]  74 y.o.  1950    Admit date: 11/24/2024    Discharge date and time:  11/30/2024  2:49 PM    Admission Diagnoses: Principal Problem:    Atrial fibrillation with RVR (HCC)  Active Problems:    Obesity hypoventilation syndrome    SHALA on CPAP    Hyperlipidemia    Acquired hypothyroidism    DM (diabetes mellitus), type 2 (HCC)    Class 3 severe obesity with body mass index (BMI) of 50.0 to 59.9 in adult    Hypothyroidism    Atherosclerotic heart disease of native coronary artery without angina pectoris    Gastroesophageal reflux disease    History of gastric bypass    Orthostatic hypotension    Peripheral vascular disorder due to diabetes mellitus (HCC)    Acute cystitis without hematuria    Atrial fibrillation with rapid ventricular response (HCC)    Foot deformity, right    Neck pain    Urinary tract infection with hematuria    Atrial fibrillation (HCC)    Urinary tract infection without hematuria    Shoulder pain  Resolved Problems:    * No resolved hospital problems. *      Discharge Diagnoses: Principal Problem:    Atrial fibrillation with RVR (HCC)  Active Problems:    Obesity hypoventilation syndrome    SHALA on CPAP    Hyperlipidemia    Acquired hypothyroidism    DM (diabetes mellitus), type 2 (HCC)    Class 3 severe obesity with body mass index (BMI) of 50.0 to 59.9 in adult    Hypothyroidism    Atherosclerotic heart disease of native coronary artery without angina pectoris    Gastroesophageal reflux disease    History of gastric bypass    Orthostatic hypotension    Peripheral vascular disorder due to diabetes mellitus (HCC)     findings.      RECOMMENDATION:   Short-term follow-up radiographs in 7-10 days or cross-sectional imaging   (CT/MRI) if there is persistent concern for fracture or other traumatic   process or the symptoms persist.         CT CSpine W/O Contrast   Final Result   No acute abnormality of the cervical spine.             Patient Instructions:      Medication List        START taking these medications      metoprolol succinate 50 MG extended release tablet  Commonly known as: TOPROL XL  Take 1 tablet by mouth daily     oxyCODONE 5 MG immediate release tablet  Commonly known as: ROXICODONE  Take 1 tablet by mouth every 6 hours as needed for Pain for up to 5 days. Max Daily Amount: 20 mg            CHANGE how you take these medications      QC LO-DOSE ASPIRIN 81 MG EC tablet  Generic drug: aspirin  What changed: Another medication with the same name was removed. Continue taking this medication, and follow the directions you see here.            CONTINUE taking these medications      albuterol (2.5 MG/3ML) 0.083% nebulizer solution  Commonly known as: PROVENTIL     allopurinol 100 MG tablet  Commonly known as: ZYLOPRIM     atorvastatin 40 MG tablet  Commonly known as: LIPITOR     cetirizine 5 MG tablet  Commonly known as: ZYRTEC     cyclobenzaprine 10 MG tablet  Commonly known as: FLEXERIL     docusate sodium 100 MG capsule  Commonly known as: COLACE     Eliquis 5 MG Tabs tablet  Generic drug: apixaban  Take 1 tablet by mouth 2 times daily     famotidine 20 MG tablet  Commonly known as: PEPCID     gabapentin 100 MG capsule  Commonly known as: NEURONTIN     levothyroxine 150 MCG tablet  Commonly known as: SYNTHROID     montelukast 10 MG tablet  Commonly known as: SINGULAIR     pantoprazole 40 MG tablet  Commonly known as: PROTONIX            STOP taking these medications      acetaminophen 500 MG Caps capsule  Commonly known as: TYLENOL     loratadine 10 MG tablet  Commonly known as: CLARITIN     losartan 25 MG

## 2024-11-30 NOTE — PROGRESS NOTES
Department of Podiatry  Progress Note    SUBJECTIVE:  lOimpia Fish is seen at bedside for recent right foot trauma. No acute events overnight. Patient denies any N/V/D/F/C/SOB/CP. Patient states that her pain is improving. States that she is going home today and that she has a podiatrist in [place] that she will follow up with. No other pedal complaints at this time.      OBJECTIVE:    Scheduled Meds:   metoprolol succinate  50 mg Oral Daily    lidocaine  1 patch TransDERmal Daily    miconazole   Topical BID    allopurinol  100 mg Oral Daily    aspirin  81 mg Oral Daily    atorvastatin  20 mg Oral Daily    apixaban  5 mg Oral BID    levothyroxine  150 mcg Oral Daily    sodium chloride flush  5-40 mL IntraVENous 2 times per day     Continuous Infusions:   sodium chloride       PRN Meds:.tiZANidine, oxyCODONE **OR** oxyCODONE, HYDROmorphone **OR** HYDROmorphone, sodium chloride flush, sodium chloride, potassium chloride **OR** potassium alternative oral replacement **OR** [DISCONTINUED] potassium chloride, magnesium sulfate, ondansetron **OR** ondansetron, melatonin, senna, aluminum & magnesium hydroxide-simethicone, acetaminophen **OR** acetaminophen, polyethylene glycol    Allergies   Allergen Reactions    Food Shortness Of Breath, Swelling and Rash     Fresh peaches, lobster, crab legs.    Iodinated Contrast Media Shortness Of Breath and Other (See Comments)    Iodine Hives and Shortness Of Breath    Acetaminophen     Crab Extract     Darvocet [Propoxyphene N-Acetaminophen] Hives    Influenza Vaccines Hives    Lamictal [Lamotrigine]     Mirtazapine Other (See Comments)    Other Hives     Pneumonia vaccine.  \"sheets\" , detergent.  Black Pepper          Pentazocine Lactate Hives    Pneumococcal Vaccines     Prednisone      Other Reaction(s): boils/rash    Propoxycaine Other (See Comments)    Propoxyphene Hives and Other (See Comments)    Cephalexin Rash    Talwin [Pentazocine] Nausea And Vomiting       BP (!)

## 2025-01-02 ENCOUNTER — TELEPHONE (OUTPATIENT)
Dept: ADMINISTRATIVE | Age: 75
End: 2025-01-02

## 2025-03-31 ENCOUNTER — HOSPITAL ENCOUNTER (OUTPATIENT)
Age: 75
Discharge: HOME | End: 2025-03-31
Payer: MEDICARE

## 2025-03-31 ENCOUNTER — HOSPITAL ENCOUNTER (OUTPATIENT)
Dept: WOUND CARE | Age: 75
Discharge: HOME | End: 2025-03-31
Attending: SURGERY | Admitting: SURGERY
Payer: MEDICARE

## 2025-03-31 VITALS
WEIGHT: 289 LBS | BODY MASS INDEX: 48.15 KG/M2 | DIASTOLIC BLOOD PRESSURE: 72 MMHG | HEART RATE: 80 BPM | SYSTOLIC BLOOD PRESSURE: 124 MMHG | TEMPERATURE: 98.1 F | HEIGHT: 65 IN | RESPIRATION RATE: 18 BRPM

## 2025-03-31 DIAGNOSIS — B35.6 TINEA CRURIS: ICD-10-CM

## 2025-03-31 DIAGNOSIS — S31.109A OPEN WOUND OF ABDOMINAL WALL, INITIAL ENCOUNTER: ICD-10-CM

## 2025-03-31 DIAGNOSIS — S31.109A OPEN WOUND OF ABDOMINAL WALL, INITIAL ENCOUNTER: Primary | ICD-10-CM

## 2025-03-31 PROBLEM — I48.0 PAF (PAROXYSMAL ATRIAL FIBRILLATION) (HCC): Status: RESOLVED | Noted: 2018-12-10 | Resolved: 2025-03-31

## 2025-03-31 PROBLEM — N39.0 URINARY TRACT INFECTION WITHOUT HEMATURIA: Status: RESOLVED | Noted: 2024-11-28 | Resolved: 2025-03-31

## 2025-03-31 PROBLEM — I48.91 ATRIAL FIBRILLATION WITH RAPID VENTRICULAR RESPONSE (HCC): Status: RESOLVED | Noted: 2024-11-25 | Resolved: 2025-03-31

## 2025-03-31 PROBLEM — N39.0 URINARY TRACT INFECTION WITH HEMATURIA: Status: RESOLVED | Noted: 2024-11-28 | Resolved: 2025-03-31

## 2025-03-31 PROBLEM — I48.91 ATRIAL FIBRILLATION WITH RVR (HCC): Status: RESOLVED | Noted: 2024-11-24 | Resolved: 2025-03-31

## 2025-03-31 PROBLEM — U07.1 COVID-19: Status: RESOLVED | Noted: 2021-12-27 | Resolved: 2025-03-31

## 2025-03-31 PROBLEM — F17.210 TOBACCO DEPENDENCE DUE TO CIGARETTES: Status: RESOLVED | Noted: 2018-05-29 | Resolved: 2025-03-31

## 2025-03-31 PROBLEM — R31.9 URINARY TRACT INFECTION WITH HEMATURIA: Status: RESOLVED | Noted: 2024-11-28 | Resolved: 2025-03-31

## 2025-03-31 PROBLEM — N30.00 ACUTE CYSTITIS WITHOUT HEMATURIA: Status: RESOLVED | Noted: 2024-11-25 | Resolved: 2025-03-31

## 2025-03-31 LAB
ALBUMIN SERPL-MCNC: 3.4 G/DL (ref 3.5–5.2)
ALP SERPL-CCNC: 91 U/L (ref 35–104)
ALT SERPL-CCNC: 7 U/L (ref 0–32)
ANION GAP SERPL CALCULATED.3IONS-SCNC: 14 MMOL/L (ref 7–16)
AST SERPL-CCNC: 14 U/L (ref 0–31)
BILIRUB SERPL-MCNC: 0.3 MG/DL (ref 0–1.2)
BUN SERPL-MCNC: 19 MG/DL (ref 6–23)
CALCIUM SERPL-MCNC: 9.3 MG/DL (ref 8.6–10.2)
CHLORIDE SERPL-SCNC: 105 MMOL/L (ref 98–107)
CO2 SERPL-SCNC: 15 MMOL/L (ref 22–29)
CREAT SERPL-MCNC: 0.9 MG/DL (ref 0.5–1)
ERYTHROCYTE [DISTWIDTH] IN BLOOD BY AUTOMATED COUNT: 13.3 % (ref 11.5–15)
GFR, ESTIMATED: 68 ML/MIN/1.73M2
GLUCOSE SERPL-MCNC: 76 MG/DL (ref 74–99)
HCT VFR BLD AUTO: 44.3 % (ref 34–48)
HGB BLD-MCNC: 14.2 G/DL (ref 11.5–15.5)
MCH RBC QN AUTO: 30 PG (ref 26–35)
MCHC RBC AUTO-ENTMCNC: 32.1 G/DL (ref 32–34.5)
MCV RBC AUTO: 93.7 FL (ref 80–99.9)
PLATELET # BLD AUTO: 190 K/UL (ref 130–450)
PMV BLD AUTO: 10.5 FL (ref 7–12)
POTASSIUM SERPL-SCNC: 4.7 MMOL/L (ref 3.5–5)
PROT SERPL-MCNC: 6.9 G/DL (ref 6.4–8.3)
RBC # BLD AUTO: 4.73 M/UL (ref 3.5–5.5)
SODIUM SERPL-SCNC: 134 MMOL/L (ref 132–146)
WBC OTHER # BLD: 7.6 K/UL (ref 4.5–11.5)

## 2025-03-31 PROCEDURE — 11042 DBRDMT SUBQ TIS 1ST 20SQCM/<: CPT | Performed by: SURGERY

## 2025-03-31 PROCEDURE — 36415 COLL VENOUS BLD VENIPUNCTURE: CPT

## 2025-03-31 PROCEDURE — 11042 DBRDMT SUBQ TIS 1ST 20SQCM/<: CPT

## 2025-03-31 PROCEDURE — 99204 OFFICE O/P NEW MOD 45 MIN: CPT | Performed by: SURGERY

## 2025-03-31 PROCEDURE — 85027 COMPLETE CBC AUTOMATED: CPT

## 2025-03-31 PROCEDURE — 99213 OFFICE O/P EST LOW 20 MIN: CPT

## 2025-03-31 PROCEDURE — 80053 COMPREHEN METABOLIC PANEL: CPT

## 2025-03-31 RX ORDER — LIDOCAINE HYDROCHLORIDE 40 MG/ML
SOLUTION TOPICAL PRN
OUTPATIENT
Start: 2025-03-31

## 2025-03-31 RX ORDER — CLOBETASOL PROPIONATE 0.5 MG/G
OINTMENT TOPICAL PRN
OUTPATIENT
Start: 2025-03-31

## 2025-03-31 RX ORDER — SODIUM CHLOR/HYPOCHLOROUS ACID 0.033 %
SOLUTION, IRRIGATION IRRIGATION PRN
OUTPATIENT
Start: 2025-03-31

## 2025-03-31 RX ORDER — LIDOCAINE HYDROCHLORIDE 20 MG/ML
JELLY TOPICAL PRN
OUTPATIENT
Start: 2025-03-31

## 2025-03-31 RX ORDER — BETAMETHASONE DIPROPIONATE 0.5 MG/G
CREAM TOPICAL PRN
OUTPATIENT
Start: 2025-03-31

## 2025-03-31 RX ORDER — BACITRACIN ZINC AND POLYMYXIN B SULFATE 500; 1000 [USP'U]/G; [USP'U]/G
OINTMENT TOPICAL PRN
OUTPATIENT
Start: 2025-03-31

## 2025-03-31 RX ORDER — GENTAMICIN SULFATE 1 MG/G
OINTMENT TOPICAL PRN
OUTPATIENT
Start: 2025-03-31

## 2025-03-31 RX ORDER — BACITRACIN ZINC 500 [USP'U]/G
OINTMENT TOPICAL PRN
OUTPATIENT
Start: 2025-03-31

## 2025-03-31 RX ORDER — LIDOCAINE 40 MG/G
CREAM TOPICAL PRN
OUTPATIENT
Start: 2025-03-31

## 2025-03-31 RX ORDER — LIDOCAINE 50 MG/G
OINTMENT TOPICAL PRN
OUTPATIENT
Start: 2025-03-31

## 2025-03-31 RX ORDER — TERBINAFINE HYDROCHLORIDE 250 MG/1
250 TABLET ORAL DAILY
Qty: 42 TABLET | Refills: 0 | Status: SHIPPED | OUTPATIENT
Start: 2025-03-31 | End: 2025-05-12

## 2025-03-31 RX ORDER — SILVER SULFADIAZINE 10 MG/G
CREAM TOPICAL PRN
OUTPATIENT
Start: 2025-03-31

## 2025-03-31 RX ORDER — MUPIROCIN 20 MG/G
OINTMENT TOPICAL PRN
OUTPATIENT
Start: 2025-03-31

## 2025-03-31 RX ORDER — TRIAMCINOLONE ACETONIDE 1 MG/G
OINTMENT TOPICAL PRN
OUTPATIENT
Start: 2025-03-31

## 2025-03-31 RX ORDER — NEOMYCIN/BACITRACIN/POLYMYXINB 3.5-400-5K
OINTMENT (GRAM) TOPICAL PRN
OUTPATIENT
Start: 2025-03-31

## 2025-03-31 NOTE — DISCHARGE INSTRUCTIONS
Visit Discharge/Physician Orders    Discharge condition: Stable  Assessment of pain at discharge: minimal   Anesthetic used: lidocaine 4%   Discharge to: Home  Left via:Private automobile  Accompanied by: accompanied by self   ECF/[facility]HA: [facility]- phone: [phone]    Dressing Orders:Cleanse wound to mid abdomen with normal saline, apply ALGINATE AG to wound bed and cover with ABD pad- adhere with tape, change daily.    Treatment Orders:  3/31 Lab work ordered    EAT DIET WITH PROTEINS AND VITAMIN C  TAKE A MULTIVITAMIN DAILY IF NOT CONTRAINDICATED      [facility] followup visit ________1 week _____________________  (Please note your next appointment above and if you are unable to keep, kindly give a 24 hour notice. Thank you.)    Physician signature:__________________________      If you experience any of the following, please call the Wound Care Center during business hours:    * Increase in Pain  * Temperature over 101  * Increase in drainage from your wound  * Drainage with a foul odor  * Bleeding  * Increase in swelling  * Need for compression bandage changes due to slippage, breakthrough drainage.    If you need medical attention outside of the business hours of the Wound Care Centers please contact your PCP or go to the nearest emergency room.

## 2025-03-31 NOTE — PROGRESS NOTES
Wound Care Supplies  Call patient with out of pocket costs     Supply Company:     [facility] [address] [facility] [address] [place] [id number]  p: [phone] f: [phone]     Ordering Center:     [facility]  [address] [facility] [address]  [phone]  WOUND CARE Dept: [phone]   FAX NUMBER [phone]    Patient Information:      Ashley Garcia  [address] [facility] [address]   [phone]   : 1950  AGE: 74 y.o.     GENDER: female   EPISODE DATE: 3/31/2025    Insurance:      PRIMARY INSURANCE:  Plan: HU[employer] GOLD PLUS HMO  Coverage: HU[employer]NA MEDICARE  Effective Date: 2024  Group Number: [unfilled]  Subscriber Number: [id number] - (Medicare Managed)    Payer/Plan Subscr  Sex Relation Sub. Ins. ID Effective Group Num   1. [employer] MEDICA* ASHLEY GARCIA 1950 Female Self [id number]                                   [address] [address]   2. [employer] MY* ASHLEY GARCIA 1950 Female Self [id number]                                   [address]       Patient Wound Information:      Problem List Items Addressed This Visit    None      WOUNDS REQUIRING DRESSING SUPPLIES:     Wound 25 Abdomen Mid;Lower #1 (Active)   Wound Image   25 1408   Wound Etiology Other 25 1408   Wound Length (cm) 2.5 cm 25 1408   Wound Width (cm) 0.3 cm 25 1408   Wound Depth (cm) 0.2 cm 25 1408   Wound Surface Area (cm^2) 0.75 cm^2 25 1408   Wound Volume (cm^3) 0.15 cm^3 25 1408   Post-Procedure Length (cm) 2.6 cm 25 1432   Post-Procedure Width (cm) 0.5 cm 25 1432   Post-Procedure Depth (cm) 0.3 cm 25 1432   Post-Procedure Surface Area (cm^2) 1.3 cm^2 25 143   Post-Procedure Volume (cm^3) 0.39 cm^3 25 1432   Wound Assessment Fibrin;Granulation tissue 25 1408   Drainage Amount Moderate (25-50%) 25   Drainage Description Serosanguinous;Serous 
this patient has appropriate indication for HBO Therapy: No    Treatment Note please see attached Discharge Instructions    Written patient dismissal instructions given to patient and signed by patient or POA.         Discharge Instructions         Visit Discharge/Physician Orders    Discharge condition: Stable  Assessment of pain at discharge: minimal   Anesthetic used: lidocaine 4%   Discharge to: Home  Left via:Private automobile  Accompanied by: accompanied by self   ECF/[facility]HA: [facility]- phone: [phone]    Dressing Orders:Cleanse wound to mid abdomen with normal saline, apply ALGINATE AG to wound bed and cover with ABD pad- adhere with tape, change daily.    Treatment Orders:  3/31 Lab work ordered    EAT DIET WITH PROTEINS AND VITAMIN C  TAKE A MULTIVITAMIN DAILY IF NOT CONTRAINDICATED      [facility] followup visit ________1 week _____________________  (Please note your next appointment above and if you are unable to keep, kindly give a 24 hour notice. Thank you.)    Physician signature:__________________________      If you experience any of the following, please call the Wound Care Center during business hours:    * Increase in Pain  * Temperature over 101  * Increase in drainage from your wound  * Drainage with a foul odor  * Bleeding  * Increase in swelling  * Need for compression bandage changes due to slippage, breakthrough drainage.    If you need medical attention outside of the business hours of the Wound Care Centers please contact your PCP or go to the nearest emergency room.        Electronically signed by Vito Carpenter MD on 3/31/2025 at 2:44 PM

## 2025-04-01 ENCOUNTER — CLINICAL DOCUMENTATION (OUTPATIENT)
Dept: VASCULAR SURGERY | Age: 75
End: 2025-04-01

## 2025-04-01 NOTE — DISCHARGE INSTRUCTIONS
Visit Discharge/Physician Orders     Discharge condition: Stable  Assessment of pain at discharge: minimal   Anesthetic used: lidocaine 4%   Discharge to: Home  Left via:Private automobile  Accompanied by: accompanied by self   ECF/HHA: [facility]- phone: [phone]     Dressing Orders:Cleanse wound to mid abdomen with normal saline, apply ALGINATE AG to wound bed and cover with ABD pad- adhere with tape, change daily.     Treatment Orders:  3/31 Lab work ordered    EAT DIET WITH PROTEINS AND VITAMIN C  TAKE A MULTIVITAMIN DAILY IF NOT CONTRAINDICATED       [facility] followup visit ________1 week _____________________  (Please note your next appointment above and if you are unable to keep, kindly give a 24 hour notice. Thank you.)     Physician signature:__________________________        If you experience any of the following, please call the Wound Care Center during business hours:     * Increase in Pain  * Temperature over 101  * Increase in drainage from your wound  * Drainage with a foul odor  * Bleeding  * Increase in swelling  * Need for compression bandage changes due to slippage, breakthrough drainage.     If you need medical attention outside of the business hours of the Wound Care Centers please contact your PCP or go to the nearest emergency room.

## 2025-04-01 NOTE — PROGRESS NOTES
Patient's lab work was reviewed, CBC CMP, no major abnormal findings other than albumin of 3.4 g, will discuss with patient the testicles when she comes to see me at the wound care center

## 2025-04-07 ENCOUNTER — HOSPITAL ENCOUNTER (OUTPATIENT)
Dept: WOUND CARE | Age: 75
Discharge: HOME | End: 2025-04-07
Attending: SURGERY | Admitting: SURGERY

## 2025-05-01 NOTE — DISCHARGE INSTRUCTIONS
Visit Discharge/Physician Orders     Discharge condition: Stable  Assessment of pain at discharge: minimal   Anesthetic used: lidocaine 4%   Discharge to: Home  Left via:Private automobile  Accompanied by: accompanied by self   ECF/[facility]HA: [facility]- phone: [phone]     Dressing Orders:Cleanse wound to mid abdomen with normal saline, apply ALGINATE AG to wound bed and cover with ABD pad- adhere with tape, change daily.     Treatment Orders:  3/31 Lab work ordered    EAT DIET WITH PROTEINS AND VITAMIN C  TAKE A MULTIVITAMIN DAILY IF NOT CONTRAINDICATED       5/7/25 Wound Culture    [facility] followup visit ________1 week _____________________  (Please note your next appointment above and if you are unable to keep, kindly give a 24 hour notice. Thank you.)     Physician signature:__________________________        If you experience any of the following, please call the Wound Care Center during business hours:     * Increase in Pain  * Temperature over 101  * Increase in drainage from your wound  * Drainage with a foul odor  * Bleeding  * Increase in swelling  * Need for compression bandage changes due to slippage, breakthrough drainage.     If you need medical attention outside of the business hours of the Wound Care Centers please contact your PCP or go to the nearest emergency room.

## 2025-05-07 ENCOUNTER — HOSPITAL ENCOUNTER (OUTPATIENT)
Dept: WOUND CARE | Age: 75
Discharge: HOME | End: 2025-05-07
Attending: SURGERY | Admitting: SURGERY
Payer: MEDICARE

## 2025-05-07 VITALS
TEMPERATURE: 97.5 F | HEIGHT: 65 IN | RESPIRATION RATE: 16 BRPM | BODY MASS INDEX: 48.15 KG/M2 | HEART RATE: 80 BPM | SYSTOLIC BLOOD PRESSURE: 143 MMHG | DIASTOLIC BLOOD PRESSURE: 74 MMHG | WEIGHT: 289 LBS

## 2025-05-07 DIAGNOSIS — S31.109S OPEN ABDOMINAL WALL WOUND, SEQUELA: ICD-10-CM

## 2025-05-07 DIAGNOSIS — S31.109A OPEN WOUND OF ABDOMINAL WALL, INITIAL ENCOUNTER: Primary | ICD-10-CM

## 2025-05-07 PROBLEM — M25.519 SHOULDER PAIN: Status: RESOLVED | Noted: 2024-11-29 | Resolved: 2025-05-07

## 2025-05-07 PROBLEM — M54.2 NECK PAIN: Status: RESOLVED | Noted: 2024-11-28 | Resolved: 2025-05-07

## 2025-05-07 PROCEDURE — 87205 SMEAR GRAM STAIN: CPT

## 2025-05-07 PROCEDURE — 86403 PARTICLE AGGLUT ANTBDY SCRN: CPT

## 2025-05-07 PROCEDURE — 87070 CULTURE OTHR SPECIMN AEROBIC: CPT

## 2025-05-07 PROCEDURE — 11042 DBRDMT SUBQ TIS 1ST 20SQCM/<: CPT

## 2025-05-07 PROCEDURE — 11042 DBRDMT SUBQ TIS 1ST 20SQCM/<: CPT | Performed by: SURGERY

## 2025-05-07 RX ORDER — BACITRACIN ZINC 500 [USP'U]/G
OINTMENT TOPICAL PRN
OUTPATIENT
Start: 2025-05-07

## 2025-05-07 RX ORDER — MUPIROCIN 20 MG/G
OINTMENT TOPICAL PRN
OUTPATIENT
Start: 2025-05-07

## 2025-05-07 RX ORDER — LIDOCAINE 50 MG/G
OINTMENT TOPICAL PRN
OUTPATIENT
Start: 2025-05-07

## 2025-05-07 RX ORDER — LIDOCAINE HYDROCHLORIDE 40 MG/ML
SOLUTION TOPICAL PRN
OUTPATIENT
Start: 2025-05-07

## 2025-05-07 RX ORDER — LIDOCAINE HYDROCHLORIDE 40 MG/ML
SOLUTION TOPICAL PRN
Status: DISCONTINUED | OUTPATIENT
Start: 2025-05-07 | End: 2025-05-08 | Stop reason: HOSPADM

## 2025-05-07 RX ORDER — GENTAMICIN SULFATE 1 MG/G
OINTMENT TOPICAL PRN
OUTPATIENT
Start: 2025-05-07

## 2025-05-07 RX ORDER — LIDOCAINE HYDROCHLORIDE 20 MG/ML
JELLY TOPICAL PRN
OUTPATIENT
Start: 2025-05-07

## 2025-05-07 RX ORDER — CLOBETASOL PROPIONATE 0.5 MG/G
OINTMENT TOPICAL PRN
OUTPATIENT
Start: 2025-05-07

## 2025-05-07 RX ORDER — SILVER SULFADIAZINE 10 MG/G
CREAM TOPICAL PRN
OUTPATIENT
Start: 2025-05-07

## 2025-05-07 RX ORDER — TRIAMCINOLONE ACETONIDE 1 MG/G
OINTMENT TOPICAL PRN
OUTPATIENT
Start: 2025-05-07

## 2025-05-07 RX ORDER — SODIUM CHLOR/HYPOCHLOROUS ACID 0.033 %
SOLUTION, IRRIGATION IRRIGATION PRN
OUTPATIENT
Start: 2025-05-07

## 2025-05-07 RX ORDER — LIDOCAINE 40 MG/G
CREAM TOPICAL PRN
OUTPATIENT
Start: 2025-05-07

## 2025-05-07 RX ORDER — NEOMYCIN/BACITRACIN/POLYMYXINB 3.5-400-5K
OINTMENT (GRAM) TOPICAL PRN
OUTPATIENT
Start: 2025-05-07

## 2025-05-07 RX ORDER — BACITRACIN ZINC AND POLYMYXIN B SULFATE 500; 1000 [USP'U]/G; [USP'U]/G
OINTMENT TOPICAL PRN
OUTPATIENT
Start: 2025-05-07

## 2025-05-07 RX ORDER — BETAMETHASONE DIPROPIONATE 0.5 MG/G
CREAM TOPICAL PRN
OUTPATIENT
Start: 2025-05-07

## 2025-05-07 RX ADMIN — LIDOCAINE HYDROCHLORIDE 10 ML: 40 SOLUTION TOPICAL at 13:36

## 2025-05-07 NOTE — PROGRESS NOTES
Wound Healing Center Followup Visit Note    Referring Physician : Vitaliy Thayer MD  Olimpia Fish  MEDICAL RECORD NUMBER:  [id number]  AGE: 75 y.o.   GENDER: female  : 1950  EPISODE DATE:  2025    Subjective:     Chief Complaint   Patient presents with    Wound Check     Abdomen       HISTORY of PRESENT ILLNESS HPI   Olimpia Fish is a 75 y.o. female who presents today in regards to follow up evaluation and treatment of wound/ulcer.  That patient's past medical, family and social hx were reviewed and changes were made if present.    History of Wound Context:  The patient has had a wound of mid abdomen which was first noted approximately 2 months ago.  This has been treated by the patient.  On their initial visit to the wound healing center, 25, the patient has noted that the wound has not been improving.  The patient has had similar previous wounds in the past.       Patient states that she had 2  sections, in the past had similar ulcerations but this 1 came just 1 to 2 months ago     Patient was seen by Dr. Swanson in      Pt is currently not on abx.        2025  The abdominal wall wound looks fairly stable, still some exudate, repeat wound cultures were done, discussed with patient regarding following nutritional multivitamin supplement protein supplement      Wound/Ulcer Pain Timing/Severity: constant  Quality of pain: dull, aching  Severity:  3 / 10   Modifying Factors: Pain worsens with walking  Associated Signs/Symptoms: drainage and pain     Ulcer Identification:  Ulcer Type: non-healing surgical  Contributing Factors: obesity     Diabetic/Pressure/Non Pressure Ulcers only:  Ulcer: N/A     If patient has diabetic lower extremity wounds  Ivey Classification of diabetic lower extremity wounds:     Grade Description   []  0 No open wound   []  1 Superficial ulcer involving the full skin thickness   []  2 Deep ulcer involves ligament, tendon, joint capsule, or

## 2025-05-09 ENCOUNTER — TELEPHONE (OUTPATIENT)
Dept: VASCULAR SURGERY | Age: 75
End: 2025-05-09

## 2025-05-09 LAB
MICROORGANISM SPEC CULT: ABNORMAL
MICROORGANISM/AGENT SPEC: ABNORMAL
SPECIMEN DESCRIPTION: ABNORMAL

## 2025-05-09 RX ORDER — DOXYCYCLINE 100 MG/1
100 CAPSULE ORAL 2 TIMES DAILY
Qty: 20 CAPSULE | Refills: 0 | Status: SHIPPED | OUTPATIENT
Start: 2025-05-09 | End: 2025-05-19

## 2025-05-09 NOTE — TELEPHONE ENCOUNTER
Wound cultures were reviewed, MSSA, discussed with the patient, history of side effects to Keflex, right-with some discomfort, concerned, as such we will decide to use doxycycline 100 mg p.o. twice daily for 10 days

## 2025-05-09 NOTE — DISCHARGE INSTRUCTIONS
Visit Discharge/Physician Orders     Discharge condition: Stable  Assessment of pain at discharge: minimal   Anesthetic used: lidocaine 4%   Discharge to: Home  Left via:Private automobile  Accompanied by: accompanied by self   ECF/HHA: [facility]- phone: [phone]     Dressing Orders:Cleanse wound to mid abdomen with normal saline, apply ALGINATE AG to wound bed and cover with ABD pad- adhere with tape, change daily.     Treatment Orders:  3/31 Lab work ordered    EAT DIET WITH PROTEINS AND VITAMIN C  TAKE A MULTIVITAMIN DAILY IF NOT CONTRAINDICATED       5/7/25 Wound Culture     [facility] followup visit ________1 week _____________________  (Please note your next appointment above and if you are unable to keep, kindly give a 24 hour notice. Thank you.)     Physician signature:__________________________        If you experience any of the following, please call the Wound Care Center during business hours:     * Increase in Pain  * Temperature over 101  * Increase in drainage from your wound  * Drainage with a foul odor  * Bleeding  * Increase in swelling  * Need for compression bandage changes due to slippage, breakthrough drainage.     If you need medical attention outside of the business hours of the Wound Care Centers please contact your PCP or go to the nearest emergency room.

## 2025-05-14 ENCOUNTER — HOSPITAL ENCOUNTER (OUTPATIENT)
Dept: WOUND CARE | Age: 75
Discharge: HOME | End: 2025-05-14
Attending: SURGERY | Admitting: SURGERY

## 2025-05-15 NOTE — DISCHARGE INSTRUCTIONS
Visit Discharge/Physician Orders     Discharge condition: Stable  Assessment of pain at discharge: minimal   Anesthetic used: lidocaine 4%   Discharge to: Home  Left via:Private automobile  Accompanied by: accompanied by self   ECF/HHA: [facility]- phone: [phone]     Dressing Orders:Cleanse wound to mid abdomen with normal saline, apply ALGINATE AG to wound bed and cover with ABD pad- adhere with tape, change daily.     Treatment Orders:  3/31 Lab work ordered    EAT DIET WITH PROTEINS AND VITAMIN C  TAKE A MULTIVITAMIN DAILY IF NOT CONTRAINDICATED       5/7/25 Wound Culture     [facility] followup visit ________1 week _____________________  (Please note your next appointment above and if you are unable to keep, kindly give a 24 hour notice. Thank you.)     Physician signature:__________________________        If you experience any of the following, please call the Wound Care Center during business hours:     * Increase in Pain  * Temperature over 101  * Increase in drainage from your wound  * Drainage with a foul odor  * Bleeding  * Increase in swelling  * Need for compression bandage changes due to slippage, breakthrough drainage.     If you need medical attention outside of the business hours of the Wound Care Centers please contact your PCP or go to the nearest emergency room.

## 2025-05-21 ENCOUNTER — HOSPITAL ENCOUNTER (OUTPATIENT)
Dept: WOUND CARE | Age: 75
Discharge: HOME | End: 2025-05-21
Attending: SURGERY | Admitting: SURGERY

## 2025-06-06 ENCOUNTER — TRANSCRIBE ORDERS (OUTPATIENT)
Dept: SLEEP CENTER | Age: 75
End: 2025-06-06

## 2025-06-06 DIAGNOSIS — G47.33 OBSTRUCTIVE SLEEP APNEA (ADULT) (PEDIATRIC): Primary | ICD-10-CM

## 2025-06-17 ENCOUNTER — OFFICE VISIT (OUTPATIENT)
Dept: CARDIOLOGY CLINIC | Age: 75
End: 2025-06-17
Payer: MEDICARE

## 2025-06-17 VITALS
HEART RATE: 80 BPM | SYSTOLIC BLOOD PRESSURE: 124 MMHG | DIASTOLIC BLOOD PRESSURE: 70 MMHG | BODY MASS INDEX: 48.15 KG/M2 | RESPIRATION RATE: 18 BRPM | HEIGHT: 65 IN | OXYGEN SATURATION: 94 % | WEIGHT: 289 LBS | TEMPERATURE: 97.3 F

## 2025-06-17 DIAGNOSIS — I25.10 ATHEROSCLEROSIS OF NATIVE CORONARY ARTERY OF NATIVE HEART WITHOUT ANGINA PECTORIS: ICD-10-CM

## 2025-06-17 DIAGNOSIS — I48.0 PAF (PAROXYSMAL ATRIAL FIBRILLATION) (HCC): Primary | ICD-10-CM

## 2025-06-17 DIAGNOSIS — I10 ESSENTIAL HYPERTENSION: ICD-10-CM

## 2025-06-17 PROCEDURE — 1159F MED LIST DOCD IN RCRD: CPT | Performed by: INTERNAL MEDICINE

## 2025-06-17 PROCEDURE — 93000 ELECTROCARDIOGRAM COMPLETE: CPT | Performed by: INTERNAL MEDICINE

## 2025-06-17 PROCEDURE — 3074F SYST BP LT 130 MM HG: CPT | Performed by: INTERNAL MEDICINE

## 2025-06-17 PROCEDURE — G8399 PT W/DXA RESULTS DOCUMENT: HCPCS | Performed by: INTERNAL MEDICINE

## 2025-06-17 PROCEDURE — 1160F RVW MEDS BY RX/DR IN RCRD: CPT | Performed by: INTERNAL MEDICINE

## 2025-06-17 PROCEDURE — 1124F ACP DISCUSS-NO DSCNMKR DOCD: CPT | Performed by: INTERNAL MEDICINE

## 2025-06-17 PROCEDURE — 1090F PRES/ABSN URINE INCON ASSESS: CPT | Performed by: INTERNAL MEDICINE

## 2025-06-17 PROCEDURE — 3017F COLORECTAL CA SCREEN DOC REV: CPT | Performed by: INTERNAL MEDICINE

## 2025-06-17 PROCEDURE — 3078F DIAST BP <80 MM HG: CPT | Performed by: INTERNAL MEDICINE

## 2025-06-17 PROCEDURE — 1036F TOBACCO NON-USER: CPT | Performed by: INTERNAL MEDICINE

## 2025-06-17 PROCEDURE — 99214 OFFICE O/P EST MOD 30 MIN: CPT | Performed by: INTERNAL MEDICINE

## 2025-06-17 PROCEDURE — G8417 CALC BMI ABV UP PARAM F/U: HCPCS | Performed by: INTERNAL MEDICINE

## 2025-06-17 PROCEDURE — G8427 DOCREV CUR MEDS BY ELIG CLIN: HCPCS | Performed by: INTERNAL MEDICINE

## 2025-06-17 RX ORDER — PREGABALIN 75 MG/1
CAPSULE ORAL
COMMUNITY

## 2025-06-17 RX ORDER — CETIRIZINE HYDROCHLORIDE 10 MG/1
TABLET ORAL
COMMUNITY
Start: 2025-01-02

## 2025-06-17 RX ORDER — FLUTICASONE PROPIONATE 50 MCG
SPRAY, SUSPENSION (ML) NASAL
COMMUNITY
Start: 2024-01-09

## 2025-06-17 NOTE — PROGRESS NOTES
Patient Active Problem List   Diagnosis    SHALA on CPAP    Obesity hypoventilation syndrome (HCC)    COPD (chronic obstructive pulmonary disease) ([facility])    Hyperlipidemia    Acquired hypothyroidism    DM (diabetes mellitus), type 2 ([facility])    Class 3 severe obesity with body mass index (BMI) of 50.0 to 59.9 in adult ([facility])    Hypothyroidism    Abdominal pannus    Atherosclerotic heart disease of native coronary artery without angina pectoris    Cocaine dependence ([facility])    Gastroesophageal reflux disease    History of gastric bypass    Orthostatic hypotension    Peripheral vascular disorder due to diabetes mellitus ([facility])    Epilepsy, unspecified, not intractable, without status epilepticus ([facility])    Foot deformity, right    Atrial fibrillation ([facility])    Open abdominal wall wound    Tinea cruris    Open abdominal wall wound, sequela       Current Outpatient Medications   Medication Sig Dispense Refill    cetirizine (ZYRTEC) 10 MG tablet Take 1 tablet every day by oral route for 90 days.      fluticasone (FLONASE) 50 MCG/ACT nasal spray Spray 1 spray by intranasal route.      pregabalin (LYRICA) 75 MG capsule       metoprolol succinate (TOPROL XL) 50 MG extended release tablet Take 1 tablet by mouth daily 30 tablet 3    pantoprazole (PROTONIX) 40 MG tablet Take 1 tablet by mouth daily      montelukast (SINGULAIR) 10 MG tablet Take 1 tablet by mouth nightly      QC LO-DOSE ASPIRIN 81 MG EC tablet       LEVOTHYROXINE SODIUM PO Take 250 mcg by mouth Daily      olopatadine (PATANOL) 0.1 % ophthalmic solution       cyclobenzaprine (FLEXERIL) 10 MG tablet Take 1 tablet by mouth nightly      ELIQUIS 5 MG TABS tablet Take 1 tablet by mouth 2 times daily 180 tablet 3    famotidine (PEPCID) 20 MG tablet Take 1 tablet by mouth 2 times daily      albuterol (PROVENTIL) (2.5 MG/3ML) 0.083% nebulizer solution Take 3 mLs by nebulization daily as needed for Wheezing      atorvastatin (LIPITOR) 40 MG tablet Take 1 tablet by mouth daily

## 2025-07-02 ENCOUNTER — HOSPITAL ENCOUNTER (OUTPATIENT)
Dept: WOUND CARE | Age: 75
Discharge: HOME | End: 2025-07-02

## 2025-07-07 ENCOUNTER — HOSPITAL ENCOUNTER (OUTPATIENT)
Dept: SLEEP CENTER | Age: 75
Discharge: HOME | End: 2025-07-07
Attending: INTERNAL MEDICINE
Payer: MEDICARE

## 2025-07-07 DIAGNOSIS — G47.33 OBSTRUCTIVE SLEEP APNEA (ADULT) (PEDIATRIC): ICD-10-CM

## 2025-07-07 PROCEDURE — 95800 SLP STDY UNATTENDED: CPT

## (undated) DEVICE — Device

## (undated) DEVICE — DRESSING, ABDOMINAL, TENDERSORB, 8 X 7-1/2 IN, STERILE

## (undated) DEVICE — NEEDLE, HYPODERMIC, MONOJECT, 27 G X 1.5 IN

## (undated) DEVICE — DRAPE, SHEET, 17 X 23 IN

## (undated) DEVICE — BANDAGE, ELASTIC, ACE, ACE, DOUBLE LENGTH, 6 X 550 IN, LF

## (undated) DEVICE — COLLECTION/DELIVERY SYSTEM, COPAN ESWAB, REG SIZE SWAB

## (undated) DEVICE — DRESSING, GAUZE, SUPER, KERLIX, 7.75 X 8.75 IN, BULK, NS

## (undated) DEVICE — MAT, AIR TRANSFER, 39X81